# Patient Record
Sex: FEMALE | Race: WHITE | NOT HISPANIC OR LATINO | Employment: STUDENT | URBAN - METROPOLITAN AREA
[De-identification: names, ages, dates, MRNs, and addresses within clinical notes are randomized per-mention and may not be internally consistent; named-entity substitution may affect disease eponyms.]

---

## 2017-04-05 ENCOUNTER — OFFICE VISIT (OUTPATIENT)
Dept: FAMILY MEDICINE | Facility: CLINIC | Age: 32
End: 2017-04-05
Payer: COMMERCIAL

## 2017-04-05 VITALS
WEIGHT: 136 LBS | DIASTOLIC BLOOD PRESSURE: 56 MMHG | HEART RATE: 86 BPM | BODY MASS INDEX: 23.22 KG/M2 | HEIGHT: 64 IN | SYSTOLIC BLOOD PRESSURE: 116 MMHG | OXYGEN SATURATION: 99 % | TEMPERATURE: 98.2 F

## 2017-04-05 DIAGNOSIS — J45.20 INTERMITTENT ASTHMA, UNCOMPLICATED: ICD-10-CM

## 2017-04-05 DIAGNOSIS — T78.40XA ALLERGIC STATE, INITIAL ENCOUNTER: Primary | ICD-10-CM

## 2017-04-05 DIAGNOSIS — J20.9 BRONCHITIS, ACUTE, WITH BRONCHOSPASM: ICD-10-CM

## 2017-04-05 PROCEDURE — 99213 OFFICE O/P EST LOW 20 MIN: CPT | Performed by: FAMILY MEDICINE

## 2017-04-05 RX ORDER — PREDNISONE 20 MG/1
40 TABLET ORAL DAILY
Qty: 10 TABLET | Refills: 1 | Status: SHIPPED | OUTPATIENT
Start: 2017-04-05 | End: 2017-12-05

## 2017-04-05 NOTE — PROGRESS NOTES
"SUBJECTIVE:  Katerin Jasso is a 31 year old female who presents with the following concerns;              Symptoms: cc Present Absent Comment   Fever/Chills   x    Fatigue  x     Headache   x    Muscle or Body  Aches   x    Eye Irritation   x    Sneezing   x    Nasal Kenan/Drg x      Sinus Pressure/Pain  x  Getting better   Dental pain   x    Sore Throat  x     Swollen Glands   x    Ear Pain/Fullness   x    Cough x      Wheeze  x     Chest Discomfort x      Shortness of breath  x     Abdominal pain   x    Emesis    x    Diarrhea   x    Other   x      Symptom duration:  2 week   Symptom severity:     Treatments tried:  mucinex last week   Contacts:  son   She has a history of prolonged cough with viral bronchitis . She usually responds to a course of steroids this often happens in the spring and fall.   She has been taking allergy medicine   She also has noted recently a strange feelin gin her throat when she has eaten nuts recently she is worried about having a reaction and would like to see the allergist. With her history of  Asthma this would be recommended   [facility]  Patient Active Problem List   Diagnosis     Allergic rhinitis     Post viral asthma     GERD (gastroesophageal reflux disease)     CARDIOVASCULAR SCREENING; LDL GOAL LESS THAN 160     ROS: Constitutional, HEENT, cardiovascular, respiratory, GI, , and skin are otherwise negative except as noted above.    PHYSICAL EXAM:    /56 (BP Location: Right arm, Cuff Size: Adult Regular)  Pulse 86  Temp 98.2  F (36.8  C) (Tympanic)  Ht 5' 4\" (1.626 m)  Wt 136 lb (61.7 kg)  SpO2 99%  BMI 23.34 kg/m2  GENERAL: Active, alert and no distress.  EYES: PERRL/EOMI.  Bilateral sclera/conjunctiva clear.  HEENT: Audible congestion with post nasal discharge.  TMs gray and translucent.  Oral mucosa moist and pink.  Posterior pharynx with increased erythema. Uvula midline.  NECK: Supple with full range of motion.  Bilateral shotty anterior cervical nodes.  CV: " Regular rate and rhythm without murmur.  LUNGS: Clear to auscultation.  ABD: Soft, nontender, nondistended. No HSM or masses palpated.  SKIN:  No rash. Warm, pink. Capillary refill less than 2 seconds.  1. Intermittent asthma, uncomplicated    - predniSONE (DELTASONE) 20 MG tablet; Take 2 tablets (40 mg) by mouth daily  Dispense: 10 tablet; Refill: 1    2. Bronchitis, acute, with bronchospasm    - predniSONE (DELTASONE) 20 MG tablet; Take 2 tablets (40 mg) by mouth daily  Dispense: 10 tablet; Refill: 1    3. Allergic state, initial encounter    - ALLERGY/ASTHMA ADULT REFERRAL    Holly Miller M.D.  [facility]

## 2017-04-05 NOTE — NURSING NOTE
"Chief Complaint   Patient presents with     Cold Symptoms       Initial /56 (BP Location: Right arm, Cuff Size: Adult Regular)  Pulse 86  Temp 98.2  F (36.8  C) (Tympanic)  Ht 5' 4\" (1.626 m)  Wt 136 lb (61.7 kg)  SpO2 99%  BMI 23.34 kg/m2 Estimated body mass index is 23.34 kg/(m^2) as calculated from the following:    Height as of this encounter: 5' 4\" (1.626 m).    Weight as of this encounter: 136 lb (61.7 kg).  Medication Reconciliation: complete   Adelaide Wade CMA    "

## 2017-04-05 NOTE — MR AVS SNAPSHOT
After Visit Summary   4/5/2017    Katerin Jasso    MRN: [id number]           Patient Information     Date Of Birth          1985        Visit Information        Provider Department      4/5/2017 8:30 AM Holly Miller MD [facility]        Today's Diagnoses     Allergic state, initial encounter    -  1    Intermittent asthma, uncomplicated        Bronchitis, acute, with bronchospasm           Follow-ups after your visit        Additional Services     ALLERGY/ASTHMA ADULT REFERRAL       Your provider has referred you to: [facility]:  [facility] [phone] [email or web address]/    Please be aware that coverage of these services is subject to the terms and limitations of your health insurance plan.  Call member services at your health plan with any benefit or coverage questions.      Please bring the following with you to your appointment:    (1) Any X-Rays, CTs or MRIs which have been performed.  Contact the facility where they were done to arrange for  prior to your scheduled appointment.    (2) List of current medications  (3) This referral request   (4) Any documents/labs given to you for this referral                  Follow-up notes from your care team     Return if symptoms worsen or fail to improve.      Who to contact     Normal or non-critical lab and imaging results will be communicated to you by MyChart, letter or phone within 4 business days after the clinic has received the results. If you do not hear from us within 7 days, please contact the clinic through MyChart or phone. If you have a critical or abnormal lab result, we will notify you by phone as soon as possible.  Submit refill requests through [employer]Gig or call your pharmacy and they will forward the refill request to us. Please allow 3 business days for your refill to be completed.          If you need to speak with a  for additional information ,  "please call: [phone]             Additional Information About Your Visit        Pied Piperhart Information     Tilera gives you secure access to your electronic health record. If you see a primary care provider, you can also send messages to your care team and make appointments. If you have questions, please call your primary care clinic.  If you do not have a primary care provider, please call [phone] and they will assist you.        Care EveryWhere ID     This is your Care EveryWhere ID. This could be used by other organizations to access your [facility] medical records  [id number]        Your Vitals Were     Pulse Temperature Height Pulse Oximetry BMI (Body Mass Index)       86 98.2  F (36.8  C) (Tympanic) 5' 4\" (1.626 m) 99% 23.34 kg/m2        Blood Pressure from Last 3 Encounters:   04/05/17 116/56   07/28/16 111/75   01/21/16 133/82    Weight from Last 3 Encounters:   04/05/17 136 lb (61.7 kg)   07/28/16 136 lb (61.7 kg)   01/21/16 131 lb 12.8 oz (59.8 kg)              We Performed the Following     ALLERGY/ASTHMA ADULT REFERRAL          Today's Medication Changes          These changes are accurate as of: 4/5/17  9:52 AM.  If you have any questions, ask your nurse or doctor.               Start taking these medicines.        Dose/Directions    predniSONE 20 MG tablet   Commonly known as:  DELTASONE   Used for:  Intermittent asthma, uncomplicated, Bronchitis, acute, with bronchospasm        Dose:  40 mg   Take 2 tablets (40 mg) by mouth daily   Quantity:  10 tablet   Refills:  1            Where to get your medicines      These medications were sent to [facility]  [place], [place] - [address]  [address] [place] [address]     Phone:  [phone]     predniSONE 20 MG tablet                Primary Care Provider Office Phone # Fax #    Norma Sharma [phone]       [facility] [address] [facility] [address]        Thank you!     Thank " you for choosing [facility]  for your care. Our goal is always to provide you with excellent care. Hearing back from our patients is one way we can continue to improve our services. Please take a few minutes to complete the written survey that you may receive in the mail after your visit with us. Thank you!             Your Updated Medication List - Protect others around you: Learn how to safely use, store and throw away your medicines at www.disposemymeds.org.          This list is accurate as of: 4/5/17  9:52 AM.  Always use your most recent med list.                   Brand Name Dispense Instructions for use    * albuterol (2.5 MG/3ML) 0.083% neb solution     60 vial    Take 1 vial (2.5 mg) by nebulization every 6 hours as needed for shortness of breath / dyspnea or wheezing       * albuterol 108 (90 BASE) MCG/ACT Inhaler    albuterol    1 Inhaler    Inhale 2 puffs into the lungs every 4 hours as needed for shortness of breath / dyspnea EVERY 4 TO 6 HOURS AS NEEDED       ibuprofen 400 MG tablet    ADVIL/MOTRIN    120 tablet    Take 1-2 tablets (400-800 mg) by mouth every 6 hours as needed for other (cramping)       predniSONE 20 MG tablet    DELTASONE    10 tablet    Take 2 tablets (40 mg) by mouth daily       * Notice:  This list has 2 medication(s) that are the same as other medications prescribed for you. Read the directions carefully, and ask your doctor or other care provider to review them with you.

## 2017-07-15 ENCOUNTER — HEALTH MAINTENANCE LETTER (OUTPATIENT)
Age: 32
End: 2017-07-15

## 2017-09-13 ENCOUNTER — TELEPHONE (OUTPATIENT)
Dept: FAMILY MEDICINE | Facility: CLINIC | Age: 32
End: 2017-09-13

## 2017-09-13 NOTE — TELEPHONE ENCOUNTER
Panel Management Review      Patient has the following on her problem list:     Asthma review     ACT Total Scores 7/28/2016   ACT TOTAL SCORE -   ASTHMA ER VISITS -   ASTHMA HOSPITALIZATIONS -   ACT TOTAL SCORE (Goal Greater than or Equal to 20) 22   In the past 12 months, how many times did you visit the emergency room for your asthma without being admitted to the hospital? 0   In the past 12 months, how many times were you hospitalized overnight because of your asthma? 0      1. Is Asthma diagnosis on the Problem List? Yes    2. Is Asthma listed on Health Maintenance? Yes    3. Patient is due for:  ACT and AAP        Composite cancer screening  Chart review shows that this patient is due/due soon for the following Pap Smear  Summary:    Patient is due/failing the following:   ACT and PAP    Action needed:   Patient needs office visit for PEPAP. and Patient needs to do ACT.    Type of outreach:    Sent [email or web address] message.    Questions for provider review:    None                                                                                                                                  Adelaide Wade CMA

## 2017-10-16 DIAGNOSIS — J45.20 MILD INTERMITTENT ASTHMA WITHOUT COMPLICATION: ICD-10-CM

## 2017-10-16 RX ORDER — ALBUTEROL SULFATE 90 UG/1
AEROSOL, METERED RESPIRATORY (INHALATION)
Qty: 1 INHALER | Refills: 5 | Status: SHIPPED | OUTPATIENT
Start: 2017-10-16 | End: 2017-12-13

## 2017-10-16 NOTE — TELEPHONE ENCOUNTER
Prescription approved per [facility] Refill Protocol.  Omar King RN

## 2017-12-05 ENCOUNTER — E-VISIT (OUTPATIENT)
Dept: FAMILY MEDICINE | Facility: CLINIC | Age: 32
End: 2017-12-05
Payer: COMMERCIAL

## 2017-12-05 DIAGNOSIS — J45.990 EXERCISE-INDUCED ASTHMA: Primary | ICD-10-CM

## 2017-12-13 ENCOUNTER — OFFICE VISIT (OUTPATIENT)
Dept: FAMILY MEDICINE | Facility: CLINIC | Age: 32
End: 2017-12-13
Payer: COMMERCIAL

## 2017-12-13 VITALS
HEIGHT: 64 IN | DIASTOLIC BLOOD PRESSURE: 81 MMHG | TEMPERATURE: 98 F | HEART RATE: 98 BPM | SYSTOLIC BLOOD PRESSURE: 124 MMHG | BODY MASS INDEX: 24.45 KG/M2 | WEIGHT: 143.2 LBS

## 2017-12-13 DIAGNOSIS — J01.90 ACUTE SINUSITIS WITH SYMPTOMS > 10 DAYS: ICD-10-CM

## 2017-12-13 DIAGNOSIS — T78.40XA ALLERGIC SYMPTOMS, INITIAL ENCOUNTER: ICD-10-CM

## 2017-12-13 DIAGNOSIS — J20.9 BRONCHITIS, ACUTE, WITH BRONCHOSPASM: ICD-10-CM

## 2017-12-13 DIAGNOSIS — J45.20 MILD INTERMITTENT ASTHMA WITHOUT COMPLICATION: Primary | ICD-10-CM

## 2017-12-13 PROCEDURE — 99214 OFFICE O/P EST MOD 30 MIN: CPT | Performed by: FAMILY MEDICINE

## 2017-12-13 RX ORDER — ALBUTEROL SULFATE 90 UG/1
AEROSOL, METERED RESPIRATORY (INHALATION)
Qty: 1 INHALER | Refills: 5 | Status: SHIPPED | OUTPATIENT
Start: 2017-12-13 | End: 2018-07-11

## 2017-12-13 RX ORDER — ALBUTEROL SULFATE 0.83 MG/ML
1 SOLUTION RESPIRATORY (INHALATION) EVERY 6 HOURS PRN
Qty: 60 VIAL | Refills: 1 | Status: SHIPPED | OUTPATIENT
Start: 2017-12-13

## 2017-12-13 NOTE — NURSING NOTE
"Chief Complaint   Patient presents with     Asthma     Cold Symptoms       Initial /81 (BP Location: Right arm, Patient Position: Chair, Cuff Size: Adult Regular)  Pulse 98  Temp 98  F (36.7  C) (Tympanic)  Ht 5' 4\" (1.626 m)  Wt 143 lb 3.2 oz (65 kg)  BMI 24.58 kg/m2 Estimated body mass index is 24.58 kg/(m^2) as calculated from the following:    Height as of this encounter: 5' 4\" (1.626 m).    Weight as of this encounter: 143 lb 3.2 oz (65 kg).  Medication Reconciliation: complete   Adelaide Wade CMA    "

## 2017-12-13 NOTE — PATIENT INSTRUCTIONS
Sinus pressure is primarily a problem of drainage.  You can best help your body clear the sinus secretions and pressure by opening up the natural passageways which are often blocked by viral colds and allergies.      Short courses of a nasal decongestant spray (Afrin or Neosinephrine) are one of the most effective tools in opening sinus drainage passageways.  Their use should be restricted to 3 days though due to the high risk of nasal addiction.  Pseudoephedrine or phenylephrine (Sudafed) is often helpful but it can cause elevations in blood pressure and insomnia.     Sometimes a nasal saline spray will help rinse out the nasal passages and feel good.     Guaifenesin (Robitussin or Mucinex) helps loosen secretions and often help make the mucous more liquid and easier to clear.    For pain and fevers, acetaminophen (Tylenol) is most appropriate.  Ibuprofen (Advil) or naproxen (Aleve) are useful too and last longer but they can cause elevation of blood pressure or stomach problems.    Antihistamines (Benadryl, Dimetapp, etc.) cause sedation, confusion, bowel and urinary abnormalities and are of little use for infectious causes of cough and nasal congestion.  Their use should be reserved for allergic symptoms.    The body needs to be treated well in order to help heal itself.  Rest as needed.  It is ok to reduce food intake if appetite is poor but it is quite important to maintain/increase fluid intake.  .

## 2017-12-13 NOTE — MR AVS SNAPSHOT
After Visit Summary   12/13/2017    Katerin Jasso    MRN: [id number]           Patient Information     Date Of Birth          1985        Visit Information        Provider Department      12/13/2017 8:00 AM Holly Miller MD [facility]        Today's Diagnoses     Mild intermittent asthma without complication    -  1    Bronchitis, acute, with bronchospasm        Acute sinusitis with symptoms > 10 days        Allergic symptoms, initial encounter          Care Instructions    Sinus pressure is primarily a problem of drainage.  You can best help your body clear the sinus secretions and pressure by opening up the natural passageways which are often blocked by viral colds and allergies.      Short courses of a nasal decongestant spray (Afrin or Neosinephrine) are one of the most effective tools in opening sinus drainage passageways.  Their use should be restricted to 3 days though due to the high risk of nasal addiction.  Pseudoephedrine or phenylephrine (Sudafed) is often helpful but it can cause elevations in blood pressure and insomnia.     Sometimes a nasal saline spray will help rinse out the nasal passages and feel good.     Guaifenesin (Robitussin or Mucinex) helps loosen secretions and often help make the mucous more liquid and easier to clear.    For pain and fevers, acetaminophen (Tylenol) is most appropriate.  Ibuprofen (Advil) or naproxen (Aleve) are useful too and last longer but they can cause elevation of blood pressure or stomach problems.    Antihistamines (Benadryl, Dimetapp, etc.) cause sedation, confusion, bowel and urinary abnormalities and are of little use for infectious causes of cough and nasal congestion.  Their use should be reserved for allergic symptoms.    The body needs to be treated well in order to help heal itself.  Rest as needed.  It is ok to reduce food intake if appetite is poor but it is quite important to maintain/increase fluid intake.   .            Follow-ups after your visit        Additional Services     ALLERGY/ASTHMA ADULT REFERRAL       Your provider has referred you to: [facility]:  [facility] [phone] [email or web address]/  [facility]. [facility] [place] [phone]   [email or web address]  [facility] [place] [phone]   [email or web address]    Please be aware that coverage of these services is subject to the terms and limitations of your health insurance plan.  Call member services at your health plan with any benefit or coverage questions.      Please bring the following with you to your appointment:    (1) Any X-Rays, CTs or MRIs which have been performed.  Contact the facility where they were done to arrange for  prior to your scheduled appointment.    (2) List of current medications  (3) This referral request   (4) Any documents/labs given to you for this referral                  Who to contact     Normal or non-critical lab and imaging results will be communicated to you by Repplerhart, letter or phone within 4 business days after the clinic has received the results. If you do not hear from us within 7 days, please contact the clinic through Repplerhart or phone. If you have a critical or abnormal lab result, we will notify you by phone as soon as possible.  Submit refill requests through Sahale [employer]acks or call your pharmacy and they will forward the refill request to us. Please allow 3 business days for your refill to be completed.          If you need to speak with a  for additional information , please call: [phone]             Additional Information About Your Visit        RepplerharPlix Information     [employer] gives you secure access to your electronic health record. If you see a primary care provider, you can also send messages to your care team and make appointments. If you have questions, please call your primary care  "clinic.  If you do not have a primary care provider, please call [phone] and they will assist you.        Care EveryWhere ID     This is your Care EveryWhere ID. This could be used by other organizations to access your [facility] medical records  [id number]        Your Vitals Were     Pulse Temperature Height BMI (Body Mass Index)          98 98  F (36.7  C) (Tympanic) 5' 4\" (1.626 m) 24.58 kg/m2         Blood Pressure from Last 3 Encounters:   12/13/17 124/81   04/05/17 116/56   07/28/16 111/75    Weight from Last 3 Encounters:   12/13/17 143 lb 3.2 oz (65 kg)   04/05/17 136 lb (61.7 kg)   07/28/16 136 lb (61.7 kg)              We Performed the Following     ALLERGY/ASTHMA ADULT REFERRAL          Today's Medication Changes          These changes are accurate as of: 12/13/17  8:37 AM.  If you have any questions, ask your nurse or doctor.               Start taking these medicines.        Dose/Directions    amoxicillin-clavulanate 875-125 MG per tablet   Commonly known as:  AUGMENTIN   Used for:  Acute sinusitis with symptoms > 10 days   Started by:  Holly Miller MD        Dose:  1 tablet   Take 1 tablet by mouth 2 times daily   Quantity:  20 tablet   Refills:  0       fluticasone 100 MCG/BLIST Aepb   Commonly known as:  FLOVENT DISKUS   Used for:  Mild intermittent asthma without complication   Started by:  Holly Miller MD        Dose:  1 puff   Inhale 1 puff into the lungs 2 times daily   Quantity:  3 Inhaler   Refills:  3         These medicines have changed or have updated prescriptions.        Dose/Directions    * albuterol 108 (90 BASE) MCG/ACT Inhaler   Commonly known as:  VENTOLIN HFA   This may have changed:  See the new instructions.   Used for:  Mild intermittent asthma without complication   Changed by:  Holly Miller MD        INHALE 2 PUFFS EVERY 4-6 HOURS AS NEEDED FOR SHORTNESS OF BREATH   Quantity:  1 Inhaler   Refills:  5       * albuterol (2.5 MG/3ML) 0.083% neb solution   This " may have changed:  Another medication with the same name was changed. Make sure you understand how and when to take each.   Used for:  Bronchitis, acute, with bronchospasm   Changed by:  Holly Miller MD        Dose:  1 vial   Take 1 vial (2.5 mg) by nebulization every 6 hours as needed for shortness of breath / dyspnea or wheezing   Quantity:  60 vial   Refills:  1       * Notice:  This list has 2 medication(s) that are the same as other medications prescribed for you. Read the directions carefully, and ask your doctor or other care provider to review them with you.         Where to get your medicines      These medications were sent to [place] [id number] [place] [facility] [address] [facility]  [address] [facility] [address]     Phone:  [phone]     albuterol (2.5 MG/3ML) 0.083% neb solution    albuterol 108 (90 BASE) MCG/ACT Inhaler    amoxicillin-clavulanate 875-125 MG per tablet    fluticasone 100 MCG/BLIST Aepb                Primary Care Provider Office Phone # Fax #    Norma Sharma [phone] [address] [facility] [address]        Equal Access to Services     GERMÁN GILLIAM AH: Hadii adriana navao Somisty, waaxda luqadaha, qaybta kaalmada adeegyada, cheryl mary. So [facility] [phone].    ATENCIÓN: Si habla español, tiene a kee disposición servicios gratuitos de asistencia lingüística. [facility] [phone].    We comply with applicable federal civil rights laws and [place] laws. We do not discriminate on the basis of race, color, national origin, age, disability, sex, sexual orientation, or gender identity.            Thank you!     Thank you for choosing [facility]  for your care. Our goal is always to provide you with excellent care. Hearing back from our patients is one way we can continue to improve our services. Please take a few minutes to complete the written survey that you may receive in the mail after your visit  with us. Thank you!             Your Updated Medication List - Protect others around you: Learn how to safely use, store and throw away your medicines at www.disposemymeds.org.          This list is accurate as of: 17  8:37 AM.  Always use your most recent med list.                   Brand Name Dispense Instructions for use Diagnosis    * albuterol 108 (90 BASE) MCG/ACT Inhaler    VENTOLIN HFA    1 Inhaler    INHALE 2 PUFFS EVERY 4-6 HOURS AS NEEDED FOR SHORTNESS OF BREATH    Mild intermittent asthma without complication       * albuterol (2.5 MG/3ML) 0.083% neb solution     60 vial    Take 1 vial (2.5 mg) by nebulization every 6 hours as needed for shortness of breath / dyspnea or wheezing    Bronchitis, acute, with bronchospasm       amoxicillin-clavulanate 875-125 MG per tablet    AUGMENTIN    20 tablet    Take 1 tablet by mouth 2 times daily    Acute sinusitis with symptoms > 10 days       fluticasone 100 MCG/BLIST Aepb    FLOVENT DISKUS    3 Inhaler    Inhale 1 puff into the lungs 2 times daily    Mild intermittent asthma without complication       ibuprofen 400 MG tablet    ADVIL/MOTRIN    120 tablet    Take 1-2 tablets (400-800 mg) by mouth every 6 hours as needed for other (cramping)    S/P  section       * Notice:  This list has 2 medication(s) that are the same as other medications prescribed for you. Read the directions carefully, and ask your doctor or other care provider to review them with you.

## 2017-12-13 NOTE — PROGRESS NOTES
SUBJECTIVE:                                                    Katerin Jasso is a 32 year old female who presents to clinic today for the following health issues:    Check on cold/asthma symptoms,  Completed prednisone given 12/5/17. Still having nasal drainage, coughing, fatigue, SOB, mild wheezing     Asthma Follow-Up    Was ACT completed today?    Yes    ACT Total Scores 12/13/2017   ACT TOTAL SCORE -   ASTHMA ER VISITS -   ASTHMA HOSPITALIZATIONS -   ACT TOTAL SCORE (Goal Greater than or Equal to 20) 15   In the past 12 months, how many times did you visit the emergency room for your asthma without being admitted to the hospital? 0   In the past 12 months, how many times were you hospitalized overnight because of your asthma? 0       Recent asthma triggers that patient is dealing with: upper respiratory infections          Problem list and histories reviewed & adjusted, as indicated.  Additional history:constant postnasal drainage   Started out as a cold   Nowit has just settled into the back of the throat she has had some nasty drainage that she gets out each morning in the first part of the day.  She uses her Ventolin inhaler several times per day she does this especially during change of season and during upper respiratory infections she is done this for the last several years she goes through about an inhaler a month she does have a diagnosis of asthma but this has not been addressed recently she also has noted that she has been having more difficulty breathing and feels like her chest is tight most days for the last 3 weeks she has had an upper respiratory infection that has made her feel like she cannot get a deep breath most days she has never been on an inhaled steroid in the past but she is interested in controlling her asthma we did discuss asthma as an inflammatory disease and daily treatment  She also feels like she might be allergic to peanuts she would like a referral to an allergist to further  "pursue this she might have had some reaction in the past I think she might benefit from seeing an allergist also in relation to her asthma in the morning has nasty stuff       Patient Active Problem List   Diagnosis     Allergic rhinitis     Post viral asthma     GERD (gastroesophageal reflux disease)     CARDIOVASCULAR SCREENING; LDL GOAL LESS THAN 160     Past Surgical History:   Procedure Laterality Date      SECTION N/A 12/10/2015    Procedure:  SECTION;  Surgeon: Norma Sharma MD;  Location: [facility] OR     NO HISTORY OF SURGERY  as of 2015       Social History   Substance Use Topics     Smoking status: Former Smoker     Packs/day: 0.50     Types: Cigarettes     Quit date: 2015     Smokeless tobacco: Never Used      Comment: smoking 10cig/day- down to 1-2 cig/day with pregnancy     Alcohol use Yes      Comment: rare- quit with pregnancy     Family History   Problem Relation Age of Onset     Allergies Mother      food allergies as well as seasonal and bees     Asthma Mother      Hypertension Father      C.A.D. Father      GASTROINTESTINAL DISEASE Father      IBS- ulcer     Hypertension Paternal Grandmother      Arthritis Paternal Grandmother      CEREBROVASCULAR DISEASE Paternal Grandmother      CANCER Paternal Grandfather      bladder cancer     Alzheimer Disease Paternal Grandfather      DIABETES No family hx of      Breast Cancer No family hx of      Cancer - colorectal No family hx of              ROS:  Constitutional, HEENT, cardiovascular, pulmonary, GI, , musculoskeletal, neuro, skin, endocrine and psych systems are negative, except as otherwise noted.      OBJECTIVE:                                                    /81 (BP Location: Right arm, Patient Position: Chair, Cuff Size: Adult Regular)  Pulse 98  Temp 98  F (36.7  C) (Tympanic)  Ht 5' 4\" (1.626 m)  Wt 143 lb 3.2 oz (65 kg)  BMI 24.58 kg/m2 Body mass index is 24.58 kg/(m^2).   GENERAL: healthy, alert, well " nourished, well hydrated, no distress  HENT: ear canals- normal; TMs- normal; Nose- normal; Mouth- no ulcers, no lesions  NECK: no tenderness, no adenopathy, no asymmetry, no masses, no stiffness; thyroid- normal to palpation  RESP: lungs clear to auscultation - no rales, no rhonchi, no wheezes  CV: regular rates and rhythm, normal S1 S2, no S3 or S4 and no murmur, no click or rub -  ABDOMEN: soft, no tenderness, no  hepatosplenomegaly, no masses, normal bowel sounds  SKIN: no suspicious lesions, no rashes       ASSESSMENT/PLAN:                                                      1. Mild intermittent asthma without complication  This may now be more persistent and have her try the Flovent daily and see how she feels for the next 3-4 weeks I suspect that she is going to feel better  - albuterol (VENTOLIN HFA) 108 (90 BASE) MCG/ACT Inhaler; INHALE 2 PUFFS EVERY 4-6 HOURS AS NEEDED FOR SHORTNESS OF BREATH  Dispense: 1 Inhaler; Refill: 5  - fluticasone (FLOVENT DISKUS) 100 MCG/BLIST AEPB; Inhale 1 puff into the lungs 2 times daily  Dispense: 3 Inhaler; Refill: 3    2. Bronchitis, acute, with bronchospasm    - albuterol (2.5 MG/3ML) 0.083% neb solution; Take 1 vial (2.5 mg) by nebulization every 6 hours as needed for shortness of breath / dyspnea or wheezing  Dispense: 60 vial; Refill: 1    3. Acute sinusitis with symptoms > 10 days  See patient information  - amoxicillin-clavulanate (AUGMENTIN) 875-125 MG per tablet; Take 1 tablet by mouth 2 times daily  Dispense: 20 tablet; Refill: 0    4. Allergic symptoms, initial encounter    - ALLERGY/ASTHMA ADULT REFERRAL     reports that she quit smoking about 2 years ago. Her smoking use included Cigarettes. She smoked 0.50 packs per day. She has never used smokeless tobacco.          Holly Miller M.D.  [facility]

## 2017-12-14 ASSESSMENT — ASTHMA QUESTIONNAIRES: ACT_TOTALSCORE: 15

## 2018-01-15 ENCOUNTER — MYC MEDICAL ADVICE (OUTPATIENT)
Dept: OBGYN | Facility: CLINIC | Age: 33
End: 2018-01-15

## 2018-01-15 NOTE — TELEPHONE ENCOUNTER
"S-(situation): Having menstrual like cramping for 1 and half wks.  No abdominal pain.  LMP 11-12-17.  Stomach gurgling a lot and having diarrhea.    B-(background):  estimate day of delivery 18.    A-(assessment): No vaginal bleeding, feels real bloated after eating and \"I cannot eat a ton\".  She denies any UTI symptoms and since stomach has been gurgling she has had diarrhea.  She denies having the stomach bug.    R-(recommendations): Advised pt that she should schedule her first ob with any of the providers if she wants to be seen earlier than end of .  She will need to call clinic tomorrow to schedule this since staff have left for the day.  Pt agrees with this plan.    Leonie Watson  [facility] RN    "

## 2018-01-17 ENCOUNTER — TELEPHONE (OUTPATIENT)
Dept: FAMILY MEDICINE | Facility: CLINIC | Age: 33
End: 2018-01-17

## 2018-01-17 PROBLEM — J45.20 MILD INTERMITTENT ASTHMA WITHOUT COMPLICATION: Status: ACTIVE | Noted: 2018-01-17

## 2018-01-17 PROBLEM — J45.20 MILD INTERMITTENT ASTHMA WITHOUT COMPLICATION: Status: ACTIVE | Noted: 2017-12-13

## 2018-01-17 NOTE — TELEPHONE ENCOUNTER
Panel Management Review      Patient has the following on her problem list:     Asthma review     ACT Total Scores 12/13/2017   ACT TOTAL SCORE -   ASTHMA ER VISITS -   ASTHMA HOSPITALIZATIONS -   ACT TOTAL SCORE (Goal Greater than or Equal to 20) 15   In the past 12 months, how many times did you visit the emergency room for your asthma without being admitted to the hospital? 0   In the past 12 months, how many times were you hospitalized overnight because of your asthma? 0      1. Is Asthma diagnosis on the Problem List? Yes    2. Is Asthma listed on Health Maintenance? Yes    3. Patient is due for:  ACT due to low score and cold at last visit        Composite cancer screening  Chart review shows that this patient is due/due soon for the following Pap Smear  Summary:    Patient is due/failing the following:   ACT    Action needed:   Patient needs to do ACT.    Type of outreach:    Sent [email or web address] message.    Questions for provider review:    None                                                                                                                                  Adelaide Wade CMA

## 2018-01-18 ENCOUNTER — PRENATAL OFFICE VISIT (OUTPATIENT)
Dept: OBGYN | Facility: CLINIC | Age: 33
End: 2018-01-18

## 2018-01-18 ENCOUNTER — APPOINTMENT (OUTPATIENT)
Dept: OBGYN | Facility: CLINIC | Age: 33
End: 2018-01-18
Payer: COMMERCIAL

## 2018-01-18 DIAGNOSIS — Z34.80 PRENATAL CARE, SUBSEQUENT PREGNANCY: Primary | ICD-10-CM

## 2018-01-18 PROCEDURE — 99207 ZZC NO CHARGE NURSE ONLY: CPT | Performed by: OBSTETRICS & GYNECOLOGY

## 2018-01-26 ENCOUNTER — PRENATAL OFFICE VISIT (OUTPATIENT)
Dept: OBGYN | Facility: CLINIC | Age: 33
End: 2018-01-26
Payer: COMMERCIAL

## 2018-01-26 VITALS
BODY MASS INDEX: 23.39 KG/M2 | SYSTOLIC BLOOD PRESSURE: 128 MMHG | HEART RATE: 113 BPM | DIASTOLIC BLOOD PRESSURE: 76 MMHG | HEIGHT: 64 IN | RESPIRATION RATE: 16 BRPM | TEMPERATURE: 97.1 F | WEIGHT: 137 LBS

## 2018-01-26 DIAGNOSIS — Z34.81 PRENATAL CARE, SUBSEQUENT PREGNANCY IN FIRST TRIMESTER: Primary | ICD-10-CM

## 2018-01-26 DIAGNOSIS — Z98.891 H/O: C-SECTION: ICD-10-CM

## 2018-01-26 LAB
ABO + RH BLD: NORMAL
ABO + RH BLD: NORMAL
BLD GP AB SCN SERPL QL: NORMAL
BLOOD BANK CMNT PATIENT-IMP: NORMAL
ERYTHROCYTE [DISTWIDTH] IN BLOOD BY AUTOMATED COUNT: 11.7 % (ref 10–15)
HBV SURFACE AG SERPL QL IA: NONREACTIVE
HCT VFR BLD AUTO: 37 % (ref 35–47)
HGB BLD-MCNC: 12.8 G/DL (ref 11.7–15.7)
HIV 1+2 AB+HIV1 P24 AG SERPL QL IA: NONREACTIVE
MCH RBC QN AUTO: 33.6 PG (ref 26.5–33)
MCHC RBC AUTO-ENTMCNC: 34.6 G/DL (ref 31.5–36.5)
MCV RBC AUTO: 97 FL (ref 78–100)
PLATELET # BLD AUTO: 282 10E9/L (ref 150–450)
RBC # BLD AUTO: 3.81 10E12/L (ref 3.8–5.2)
SPECIMEN EXP DATE BLD: NORMAL
WBC # BLD AUTO: 13.7 10E9/L (ref 4–11)

## 2018-01-26 PROCEDURE — 87624 HPV HI-RISK TYP POOLED RSLT: CPT | Performed by: OBSTETRICS & GYNECOLOGY

## 2018-01-26 PROCEDURE — 87340 HEPATITIS B SURFACE AG IA: CPT | Performed by: OBSTETRICS & GYNECOLOGY

## 2018-01-26 PROCEDURE — 86762 RUBELLA ANTIBODY: CPT | Performed by: OBSTETRICS & GYNECOLOGY

## 2018-01-26 PROCEDURE — 86780 TREPONEMA PALLIDUM: CPT | Performed by: OBSTETRICS & GYNECOLOGY

## 2018-01-26 PROCEDURE — 76817 TRANSVAGINAL US OBSTETRIC: CPT | Performed by: OBSTETRICS & GYNECOLOGY

## 2018-01-26 PROCEDURE — 86901 BLOOD TYPING SEROLOGIC RH(D): CPT | Performed by: OBSTETRICS & GYNECOLOGY

## 2018-01-26 PROCEDURE — 99207 ZZC FIRST OB VISIT: CPT | Performed by: OBSTETRICS & GYNECOLOGY

## 2018-01-26 PROCEDURE — 87389 HIV-1 AG W/HIV-1&-2 AB AG IA: CPT | Performed by: OBSTETRICS & GYNECOLOGY

## 2018-01-26 PROCEDURE — 86850 RBC ANTIBODY SCREEN: CPT | Performed by: OBSTETRICS & GYNECOLOGY

## 2018-01-26 PROCEDURE — 85027 COMPLETE CBC AUTOMATED: CPT | Performed by: OBSTETRICS & GYNECOLOGY

## 2018-01-26 PROCEDURE — G0145 SCR C/V CYTO,THINLAYER,RESCR: HCPCS | Performed by: OBSTETRICS & GYNECOLOGY

## 2018-01-26 PROCEDURE — 36415 COLL VENOUS BLD VENIPUNCTURE: CPT | Performed by: OBSTETRICS & GYNECOLOGY

## 2018-01-26 PROCEDURE — 86900 BLOOD TYPING SEROLOGIC ABO: CPT | Performed by: OBSTETRICS & GYNECOLOGY

## 2018-01-26 RX ORDER — PRENATAL VIT/IRON FUM/FOLIC AC 27MG-0.8MG
1 TABLET ORAL DAILY
COMMUNITY
End: 2018-09-25

## 2018-01-26 NOTE — MR AVS SNAPSHOT
"              After Visit Summary   2018    Katerin Jasso    MRN: [id number]           Patient Information     Date Of Birth          1985        Visit Information        Provider Department      2018 10:30 AM Nelda Sanders MD; [facility]        Today's Diagnoses     Prenatal care, subsequent pregnancy in first trimester    -  1    H/O:            Follow-ups after your visit        Who to contact     If you have questions or need follow up information about today's clinic visit or your schedule please contact [facility] directly at [phone].  Normal or non-critical lab and imaging results will be communicated to you by OhLifehart, letter or phone within 4 business days after the clinic has received the results. If you do not hear from us within 7 days, please contact the clinic through OhLifehart or phone. If you have a critical or abnormal lab result, we will notify you by phone as soon as possible.  Submit refill requests through [employer] or call your pharmacy and they will forward the refill request to us. Please allow 3 business days for your refill to be completed.          Additional Information About Your Visit        MyChart Information     [employer] gives you secure access to your electronic health record. If you see a primary care provider, you can also send messages to your care team and make appointments. If you have questions, please call your primary care clinic.  If you do not have a primary care provider, please call [phone] and they will assist you.        Care EveryWhere ID     This is your Care EveryWhere ID. This could be used by other organizations to access your [place] medical records  [id number]        Your Vitals Were     Pulse Temperature Respirations Height Last Period BMI (Body Mass Index)    113 97.1  F (36.2  C) (Tympanic) 16 5' 4\" (1.626 m) 2017 23.52 kg/m2       Blood Pressure from Last 3 " Encounters:   01/26/18 128/76   12/13/17 124/81   04/05/17 116/56    Weight from Last 3 Encounters:   01/26/18 137 lb (62.1 kg)   12/13/17 143 lb 3.2 oz (65 kg)   04/05/17 136 lb (61.7 kg)              We Performed the Following     *UA reflex to Microscopic     ABO/Rh type and screen     Anti Treponema     CBC with platelets     Hepatitis B surface antigen     HIV Antigen Antibody Combo     Rubella Antibody IgG Quantitative     Urine Culture Aerobic Bacterial     US OB <14 Weeks w Transvaginal Single        Primary Care Provider Office Phone # Fax #    Norma Sharma [phone] [address]  [facility] [address]        Equal Access to Services     VON GILLIAM : Hadii aad ku hadasho Somisty, waaxda luqadaha, qaybta kaalmada adeegyada, cheryl gilin sebastian gupta . So [facility] [phone].    ATENCIÓN: Si habla español, tiene a kee disposición servicios gratuitos de asistencia lingüística. Ll[facility] [phone].    We comply with applicable federal civil rights laws and [place] laws. We do not discriminate on the basis of race, color, national origin, age, disability, sex, sexual orientation, or gender identity.            Thank you!     Thank you for choosing [facility]  for your care. Our goal is always to provide you with excellent care. Hearing back from our patients is one way we can continue to improve our services. Please take a few minutes to complete the written survey that you may receive in the mail after your visit with us. Thank you!             Your Updated Medication List - Protect others around you: Learn how to safely use, store and throw away your medicines at www.disposemymeds.org.          This list is accurate as of 1/26/18 11:14 AM.  Always use your most recent med list.                   Brand Name Dispense Instructions for use Diagnosis    * albuterol 108 (90 BASE) MCG/ACT Inhaler    VENTOLIN HFA    1 Inhaler    INHALE 2 PUFFS EVERY 4-6  HOURS AS NEEDED FOR SHORTNESS OF BREATH    Mild intermittent asthma without complication       * albuterol (2.5 MG/3ML) 0.083% neb solution     60 vial    Take 1 vial (2.5 mg) by nebulization every 6 hours as needed for shortness of breath / dyspnea or wheezing    Bronchitis, acute, with bronchospasm       fluticasone 100 MCG/BLIST Aepb    FLOVENT DISKUS    3 Inhaler    Inhale 1 puff into the lungs 2 times daily    Mild intermittent asthma without complication       prenatal multivitamin plus iron 27-0.8 MG Tabs per tablet      Take 1 tablet by mouth daily    Prenatal care, subsequent pregnancy in first trimester       * Notice:  This list has 2 medication(s) that are the same as other medications prescribed for you. Read the directions carefully, and ask your doctor or other care provider to review them with you.

## 2018-01-26 NOTE — PROGRESS NOTES
Discussed physician coverage, tertiary support, diet, exercise, weight gain, schedule of visits, routine and indicated ultrasounds, childbirth education and antepartum testing for certain birth defects.  Encouraged patient to review contents of Prenatal Breastfeeding Education Toolkit. Offered opportunity to answer questions regarding the importance of skin to skin contact, early initiation of exclusive breastfeeding for the first six months and rooming in while in the hospital.  Discussed [facility] travel advisory for Zika virus.  Syphilis is a sexually transmitted disease that can cause birth defects in the babies of untreated mothers. Every pregnant patient is tested for syphilis early in each pregnancy as part of the routine lab work. The [place] Department of [facility] has seen an increase in the rate of syphilis in [place]. The [facility] now recommends testing for syphilis 3 times during a pregnancy, the new prenatal visit, 28 weeks and when admitted for delivery. Patient accepts lab testing for syphilis.        Options for  testing for birth defects were discussed with the patient, generally including CVS testing, Quad screen serum testing, nuchal lucency/blood marker testing, genetic amniocentesis, Verifi testing  and/or level 2 ultrasound.    Current issues include: desires RCS with BTL    Past medical, surgical, social and family histories reviewed on OB questionaire and included on episode summary.  Pertinent review of systems items stated above. See OB questionaire for pertinent components of HPI.    Past Medical History:   Diagnosis Date     Asthma attack age 6    hospitalized as a child     Chickenpox      Papanicolaou smear of cervix with low grade squamous intraepithelial lesion (LGSIL) 2008    + HPV 16. Colpo and f/u paps NIL     Tobacco use disorder 2015    Quit date 2015 !      Urinary tract bacterial infections     age 20-22     See epic chart    Past Surgical History:   Procedure  "Laterality Date      SECTION N/A 12/10/2015    Procedure:  SECTION;  Surgeon: Norma Sharma MD;  Location: [facility] OR     MOUTH SURGERY      wisdom teeth     See epic chart    Patient Active Problem List    Diagnosis Date Noted     H/O:  2018     Priority: Medium     Prenatal care, subsequent pregnancy 2018     Priority: Medium     Mild intermittent asthma without complication 2017     Priority: Medium     CARDIOVASCULAR SCREENING; LDL GOAL LESS THAN 160 10/31/2010     Priority: Medium     GERD (gastroesophageal reflux disease) 2010     Priority: Medium     Post viral asthma 10/05/2005     Priority: Medium     Allergic rhinitis 2005     Priority: Medium     Problem list name updated by automated process. Provider to review         OBJECTIVE: /76 (BP Location: Right arm, Patient Position: Chair, Cuff Size: Adult Small)  Pulse 113  Temp 97.1  F (36.2  C) (Tympanic)  Resp 16  Ht 5' 4\" (1.626 m)  Wt 137 lb (62.1 kg)  LMP 2017  BMI 23.52 kg/m2    GENERAL APPEARANCE: healthy, alert and no distress  ABDOMEN:  soft, nontender, no hepato-splenomegaly or hernias  PELVIC:  EGBUS:  within normal limits  VAGINA:  normoestrogenic, well-supported, no unusual discharge  CERVIX:  smooth, non-friable, no gross lesions, thin-layer PAP was taken  UTERUS:  anteverted and enlarged, 9 weeks size  ADNEXAE:  non-tender, no masses palpable, no cul de sac nodularity     NECK: no adenopathy, no asymmetry, masses, or scars and thyroid normal to palpation     RESP: lungs clear to auscultation , respiratory effort WNL     CV: regular rates and rhythm, normal S1 S2, no S3 or S4 and no murmur, click or rub -     SKIN: no suspicious lesions or rashes     PSYCH: mentation appears normal. and affect normal/bright, oriented to person/place/time     LYMPHATICS: No axillary, cervical, inguinal, or supraclavicular nodes    Transvaginal ultrasound was performed. A live single " intrauterine pregnancy was seen.  CRL=2.90 cm, c/w 9 weeks, 6 days.  EDC by sono =18; this is significantly different from EDC by LMP; EDC adjusted to 18    Fetal heart motion was visualized. RNT=327 bpm            ASSESSMENT:    ICD-10-CM    1. Prenatal care, subsequent pregnancy in first trimester Z34.81 CBC with platelets     ABO/Rh type and screen     Hepatitis B surface antigen     Rubella Antibody IgG Quantitative     Urine Culture Aerobic Bacterial     *UA reflex to Microscopic     Anti Treponema     HIV Antigen Antibody Combo     US OB <14 Weeks w Transvaginal Single     Prenatal Vit-Fe Fumarate-FA (PRENATAL MULTIVITAMIN PLUS IRON) 27-0.8 MG TABS per tablet   2. H/O:  Z98.891          PLAN: see orders and OB problem list annotations    Nelda Sanders

## 2018-01-27 LAB — T PALLIDUM IGG+IGM SER QL: NEGATIVE

## 2018-01-29 LAB — RUBV IGG SERPL IA-ACNC: 74 IU/ML

## 2018-01-31 LAB
COPATH REPORT: NORMAL
PAP: NORMAL

## 2018-02-02 LAB
FINAL DIAGNOSIS: NORMAL
HPV HR 12 DNA CVX QL NAA+PROBE: NEGATIVE
HPV16 DNA SPEC QL NAA+PROBE: NEGATIVE
HPV18 DNA SPEC QL NAA+PROBE: NEGATIVE
SPECIMEN DESCRIPTION: NORMAL
SPECIMEN SOURCE CVX/VAG CYTO: NORMAL

## 2018-02-08 ENCOUNTER — MYC MEDICAL ADVICE (OUTPATIENT)
Dept: FAMILY MEDICINE | Facility: CLINIC | Age: 33
End: 2018-02-08

## 2018-02-08 ENCOUNTER — VIRTUAL VISIT (OUTPATIENT)
Dept: FAMILY MEDICINE | Facility: CLINIC | Age: 33
End: 2018-02-08
Payer: COMMERCIAL

## 2018-02-08 DIAGNOSIS — H10.31 ACUTE BACTERIAL CONJUNCTIVITIS OF RIGHT EYE: Primary | ICD-10-CM

## 2018-02-08 PROCEDURE — 99441 ZZC PHYSICIAN TELEPHONE EVALUATION 5-10 MIN: CPT | Performed by: PHYSICIAN ASSISTANT

## 2018-02-08 RX ORDER — TOBRAMYCIN 3 MG/ML
1 SOLUTION/ DROPS OPHTHALMIC EVERY 4 HOURS
Qty: 1 BOTTLE | Refills: 0 | Status: SHIPPED | OUTPATIENT
Start: 2018-02-08 | End: 2018-02-15

## 2018-02-08 RX ORDER — SULFAMETHOXAZOLE/TRIMETHOPRIM 800-160 MG
1 TABLET ORAL 2 TIMES DAILY
Qty: 6 TABLET | Refills: 0 | Status: CANCELLED | OUTPATIENT
Start: 2018-02-08 | End: 2018-02-11

## 2018-02-08 NOTE — MR AVS SNAPSHOT
After Visit Summary   2/8/2018    Katerin Jasso    MRN: [id number]           Patient Information     Date Of Birth          1985        Visit Information        Provider Department      2/8/2018 11:40 AM Jennifer Diaz PA-C [facility]        Today's Diagnoses     Acute bacterial conjunctivitis of right eye    -  1       Follow-ups after your visit        Your next 10 appointments already scheduled     Feb 21, 2018  2:30 PM CST   ESTABL[facility]    [address], [place] [address]   [phone]              Who to contact     Normal or non-critical lab and imaging results will be communicated to you by iPAYsthart, letter or phone within 4 business days after the clinic has received the results. If you do not hear from us within 7 days, please contact the clinic through MyChart or phone. If you have a critical or abnormal lab result, we will notify you by phone as soon as possible.  Submit refill requests through [employer] or call your pharmacy and they will forward the refill request to us. Please allow 3 business days for your refill to be completed.          If you need to speak with a  for additional information , please call: [phone]             Additional Information About Your Visit        Zuberance Information     [employer] gives you secure access to your electronic health record. If you see a primary care provider, you can also send messages to your care team and make appointments. If you have questions, please call your primary care clinic.  If you do not have a primary care provider, please call [phone] and they will assist you.        Care EveryWhere ID     This is your Care EveryWhere ID. This could be used by other organizations to access your [place] medical records  [id number]        Your Vitals Were     Last Period                    11/01/2017            Blood Pressure from Last 3 Encounters:   01/26/18 128/76   12/13/17 124/81   04/05/17 116/56    Weight from Last 3 Encounters:   01/26/18 137 lb (62.1 kg)   12/13/17 143 lb 3.2 oz (65 kg)   04/05/17 136 lb (61.7 kg)              Today, you had the following     No orders found for display         Today's Medication Changes          These changes are accurate as of 2/8/18 12:04 PM.  If you have any questions, ask your nurse or doctor.               Start taking these medicines.        Dose/Directions    tobramycin 0.3 % ophthalmic solution   Commonly known as:  TOBREX   Used for:  Acute bacterial conjunctivitis of right eye   Started by:  Jennifer Diaz PA-C        Dose:  1 drop   Apply 1 drop to eye every 4 hours for 7 days   Quantity:  1 Bottle   Refills:  0            Where to get your medicines      These medications were sent to [place] [address] [place] [address] [facility]  [address] [facility] [address]     Phone:  [phone]     tobramycin 0.3 % ophthalmic solution                Primary Care Provider Office Phone # Fax #    Norma Sharma [phone] [address] [facility] [address]        Equal Access to Services     VON GILLIAM AH: Hadii adriana ku hadasho Soomaali, waaxda luqadaha, qaybta kaalmada adeegyada, cheryl gilin haysalud mary. So [facility] [phone].    ATENCIÓN: Si habla español, tiene a kee disposición servicios gratuitos de asistencia lingüística. Llame al [phone].    We comply with applicable federal civil rights laws and [place] laws. We do not discriminate on the basis of race, color, national origin, age, disability, sex, sexual orientation, or gender identity.            Thank you!     Thank you for choosing [facility]  for your care. Our goal is always to provide you with excellent care. Hearing back from our patients is one way we can continue to improve our services. Please take  a few minutes to complete the written survey that you may receive in the mail after your visit with us. Thank you!             Your Updated Medication List - Protect others around you: Learn how to safely use, store and throw away your medicines at www.disposemymeds.org.          This list is accurate as of 2/8/18 12:04 PM.  Always use your most recent med list.                   Brand Name Dispense Instructions for use Diagnosis    * albuterol 108 (90 BASE) MCG/ACT Inhaler    VENTOLIN HFA    1 Inhaler    INHALE 2 PUFFS EVERY 4-6 HOURS AS NEEDED FOR SHORTNESS OF BREATH    Mild intermittent asthma without complication       * albuterol (2.5 MG/3ML) 0.083% neb solution     60 vial    Take 1 vial (2.5 mg) by nebulization every 6 hours as needed for shortness of breath / dyspnea or wheezing    Bronchitis, acute, with bronchospasm       fluticasone 100 MCG/BLIST Aepb    FLOVENT DISKUS    3 Inhaler    Inhale 1 puff into the lungs 2 times daily    Mild intermittent asthma without complication       prenatal multivitamin plus iron 27-0.8 MG Tabs per tablet      Take 1 tablet by mouth daily    Prenatal care, subsequent pregnancy in first trimester       tobramycin 0.3 % ophthalmic solution    TOBREX    1 Bottle    Apply 1 drop to eye every 4 hours for 7 days    Acute bacterial conjunctivitis of right eye       * Notice:  This list has 2 medication(s) that are the same as other medications prescribed for you. Read the directions carefully, and ask your doctor or other care provider to review them with you.

## 2018-02-08 NOTE — TELEPHONE ENCOUNTER
Patient reports that she received a letter from her son's classroom that jen is going around. This morning, she woke up and her right eye is red, and mattery. She wiped matter away and eye continues to water. She also has itching and sometimes blurred vision, but clears when she wipes her eye. Informed patient of telephone visit and discussed pricing as there are no available appointment today. She is ok with a telephone visit. Please advise if you would like me to schedule her for an office visit, or telephone visit. Thank you.  Amy Smith RN

## 2018-02-08 NOTE — PROGRESS NOTES
"Katerin Jasso is a 32 year old female who is being evaluated via a billable telephone visit.      The patient has been notified of following:     \"This telephone visit will be conducted via a call between you and your physician/provider. We have found that certain health care needs can be provided without the need for a physical exam.  This service lets us provide the care you need with a short phone conversation.  If a prescription is necessary we can send it directly to your pharmacy.  If lab work is needed we can place an order for that and you can then stop by our lab to have the test done at a later time.    We will bill your insurance company for this service.  Please check with your medical insurance if this type of visit is covered. You may be responsible for the cost of this type of visit if insurance coverage is denied.  The typical cost is $30 (10min), $59 (11-20min) and $85 (21-30min).  Most often these visits are shorter than 10 minutes.    If during the course of the call the physician/provider feels a telephone visit is not appropriate, you will not be charged for this service.\"       Consent has been obtained for this service by care team member: yes. See the scanned image in the medical record.  SUBJECTIVE:     Katerin Jasso complains of    Chief Complaint   Patient presents with     Eye Problem       I have reviewed and updated the patient's Past Medical History, Social History, Family History and Medication List.    ALLERGIES  Cipro [ciprofloxacin]; Depotest [testosterone]; Septra [bactrim]; and Latex    Javon Tobias, CAMERON      Additional provider notes: Eye Problem  Duration of complaint: This morning    Description:  Location: right  Pain: no  Redness: YES- mild   Discharge: YES- this morning   Trauma/foreign body: no  Use of contacts: no  Therapies tried and outcome: None      No changes in vision. No pain  No current cold or URI symptoms  ONly one eye currently  Constant " mattering/discharge  No fevers  Pink eye going around son's     OBJECTIVE:       ICD-10-CM    1. Acute bacterial conjunctivitis of right eye H10.31 tobramycin (TOBREX) 0.3 % ophthalmic solution     Follow up if not improving    I have reviewed the note as documented above.  This accurately captures the substance of my conversation with the patient,  Jennifer Diaz PA-C      Total time of call between patient and provider was 5 minutes     Javon Tobias

## 2018-02-21 ENCOUNTER — PRENATAL OFFICE VISIT (OUTPATIENT)
Dept: OBGYN | Facility: CLINIC | Age: 33
End: 2018-02-21
Payer: COMMERCIAL

## 2018-02-21 VITALS
HEIGHT: 64 IN | SYSTOLIC BLOOD PRESSURE: 118 MMHG | TEMPERATURE: 98.3 F | RESPIRATION RATE: 16 BRPM | WEIGHT: 140 LBS | HEART RATE: 111 BPM | DIASTOLIC BLOOD PRESSURE: 73 MMHG | BODY MASS INDEX: 23.9 KG/M2

## 2018-02-21 DIAGNOSIS — Z34.81 PRENATAL CARE, SUBSEQUENT PREGNANCY IN FIRST TRIMESTER: Primary | ICD-10-CM

## 2018-02-21 DIAGNOSIS — Z98.891 H/O: C-SECTION: ICD-10-CM

## 2018-02-21 PROCEDURE — 99207 ZZC PRENATAL VISIT: CPT | Performed by: OBSTETRICS & GYNECOLOGY

## 2018-02-21 NOTE — MR AVS SNAPSHOT
After Visit Summary   2018    Katerin Jasso    MRN: [id number]           Patient Information     Date Of Birth          1985        Visit Information        Provider Department      2018 2:30 PM Nikita Walton MD [facility]        Today's Diagnoses     Prenatal care, subsequent pregnancy in first trimester    -  1    H/O:            Follow-ups after your visit        Follow-up notes from your care team     Return in about 4 weeks (around 3/21/2018).      Your next 10 appointments already scheduled     Mar 20, 2018  3:30 PM CDT   [facility]ABL[facility]    [address] [address] [phone]              Who to contact     If you have questions or need follow up information about today's clinic visit or your schedule please contact [facility] directly at [phone].  Normal or non-critical lab and imaging results will be communicated to you by Sc[employer] Fashion[employer], letter or phone within 4 business days after the clinic has received the results. If you do not hear from us within 7 days, please contact the clinic through [employer] or phone. If you have a critical or abnormal lab result, we will notify you by phone as soon as possible.  Submit refill requests through [employer] or call your pharmacy and they will forward the refill request to us. Please allow 3 business days for your refill to be completed.          Additional Information About Your Visit        MyChart Information     [employer] gives you secure access to your electronic health record. If you see a primary care provider, you can also send messages to your care team and make appointments. If you have questions, please call your primary care clinic.  If you do not have a primary care provider, please call [phone] and they will assist you.        Care EveryWhere ID     This is your  "Care EveryWhere ID. This could be used by other organizations to access your [facility] medical records  [id number]        Your Vitals Were     Pulse Temperature Respirations Height Last Period BMI (Body Mass Index)    111 98.3  F (36.8  C) 16 1.626 m (5' 4\") 11/01/2017 24.03 kg/m2       Blood Pressure from Last 3 Encounters:   02/21/18 118/73   01/26/18 128/76   12/13/17 124/81    Weight from Last 3 Encounters:   02/21/18 63.5 kg (140 lb)   01/26/18 62.1 kg (137 lb)   12/13/17 65 kg (143 lb 3.2 oz)               Primary Care Provider Office Phone # Fax #    Norma Sharma [phone] [address] [address]        Equal Access to Services     [place] : Hadii adriana navao Somisty, waaxda luqadaha, qaybta kaalmada ademilayatatianna, cheryl gupta . So [facility] [phone].    ATENCIÓN: Si habla español, tiene a kee disposición servicios gratuitos de asistencia lingüística. Shashi al [phone].    We comply with applicable federal civil rights laws and [place] laws. We do not discriminate on the basis of race, color, national origin, age, disability, sex, sexual orientation, or gender identity.            Thank you!     Thank you for choosing [facility]  for your care. Our goal is always to provide you with excellent care. Hearing back from our patients is one way we can continue to improve our services. Please take a few minutes to complete the written survey that you may receive in the mail after your visit with us. Thank you!             Your Updated Medication List - Protect others around you: Learn how to safely use, store and throw away your medicines at www.disposemymeds.org.          This list is accurate as of 2/21/18 11:59 PM.  Always use your most recent med list.                   Brand Name Dispense Instructions for use Diagnosis    * albuterol 108 (90 BASE) MCG/ACT Inhaler    VENTOLIN HFA    1 Inhaler    INHALE 2 PUFFS EVERY " 4-6 HOURS AS NEEDED FOR SHORTNESS OF BREATH    Mild intermittent asthma without complication       * albuterol (2.5 MG/3ML) 0.083% neb solution     60 vial    Take 1 vial (2.5 mg) by nebulization every 6 hours as needed for shortness of breath / dyspnea or wheezing    Bronchitis, acute, with bronchospasm       fluticasone 100 MCG/BLIST Aepb    FLOVENT DISKUS    3 Inhaler    Inhale 1 puff into the lungs 2 times daily    Mild intermittent asthma without complication       prenatal multivitamin plus iron 27-0.8 MG Tabs per tablet      Take 1 tablet by mouth daily    Prenatal care, subsequent pregnancy in first trimester       * Notice:  This list has 2 medication(s) that are the same as other medications prescribed for you. Read the directions carefully, and ask your doctor or other care provider to review them with you.

## 2018-02-21 NOTE — NURSING NOTE
"Initial /73  Pulse 111  Temp 98.3  F (36.8  C)  Resp 16  Ht 5' 4\" (1.626 m)  Wt 140 lb (63.5 kg)  LMP 11/01/2017  BMI 24.03 kg/m2 Estimated body mass index is 24.03 kg/(m^2) as calculated from the following:    Height as of this encounter: 5' 4\" (1.626 m).    Weight as of this encounter: 140 lb (63.5 kg). .    q  "

## 2018-02-24 NOTE — PROGRESS NOTES
"CC: Prenatal visit    S: feeling better  Reviewed the labs- normal  She would like to have the quad screen done when appropriate    O: /73  Pulse 111  Temp 98.3  F (36.8  C)  Resp 16  Ht 1.626 m (5' 4\")  Wt 63.5 kg (140 lb)  LMP 11/01/2017  BMI 24.03 kg/m2  See above table    A: IUP @ 13+4 week EGA    P RTC 4 weeks  Quad screen  Nikita Walton          "

## 2018-03-20 ENCOUNTER — TRANSFERRED RECORDS (OUTPATIENT)
Dept: HEALTH INFORMATION MANAGEMENT | Facility: CLINIC | Age: 33
End: 2018-03-20

## 2018-03-20 ENCOUNTER — PRENATAL OFFICE VISIT (OUTPATIENT)
Dept: OBGYN | Facility: CLINIC | Age: 33
End: 2018-03-20
Payer: COMMERCIAL

## 2018-03-20 VITALS
RESPIRATION RATE: 16 BRPM | TEMPERATURE: 98.9 F | HEIGHT: 64 IN | HEART RATE: 109 BPM | BODY MASS INDEX: 23.42 KG/M2 | WEIGHT: 137.2 LBS | DIASTOLIC BLOOD PRESSURE: 80 MMHG | SYSTOLIC BLOOD PRESSURE: 124 MMHG

## 2018-03-20 DIAGNOSIS — Z34.82 PRENATAL CARE, SUBSEQUENT PREGNANCY IN SECOND TRIMESTER: Primary | ICD-10-CM

## 2018-03-20 LAB — MISCELLANEOUS TEST: NORMAL

## 2018-03-20 PROCEDURE — 36415 COLL VENOUS BLD VENIPUNCTURE: CPT | Performed by: OBSTETRICS & GYNECOLOGY

## 2018-03-20 PROCEDURE — 99207 ZZC PRENATAL VISIT: CPT | Performed by: OBSTETRICS & GYNECOLOGY

## 2018-03-20 PROCEDURE — 81511 FTL CGEN ABNOR FOUR ANAL: CPT | Mod: 90 | Performed by: OBSTETRICS & GYNECOLOGY

## 2018-03-20 PROCEDURE — 99000 SPECIMEN HANDLING OFFICE-LAB: CPT | Performed by: OBSTETRICS & GYNECOLOGY

## 2018-03-20 NOTE — NURSING NOTE
"Initial /80  Pulse 109  Temp 98.9  F (37.2  C)  Resp 16  Ht 5' 4\" (1.626 m)  Wt 137 lb 3.2 oz (62.2 kg)  LMP 11/01/2017  BMI 23.55 kg/m2 Estimated body mass index is 23.55 kg/(m^2) as calculated from the following:    Height as of this encounter: 5' 4\" (1.626 m).    Weight as of this encounter: 137 lb 3.2 oz (62.2 kg). .      "

## 2018-03-20 NOTE — PROGRESS NOTES
"CC: Prenatal visit    S: Feeling well    O: /80  Pulse 109  Temp 98.9  F (37.2  C)  Resp 16  Ht 5' 4\" (1.626 m)  Wt 137 lb 3.2 oz (62.2 kg)  LMP 11/01/2017  BMI 23.55 kg/m2  See above table    A: IUP @ 17+3 week EGA    P RTC 4 weeks  Quad screen  Anatomic ULTRASOUND       Nikita Walton      "

## 2018-03-20 NOTE — MR AVS SNAPSHOT
After Visit Summary   3/20/2018    Katerin Jasso    MRN: [id number]           Patient Information     Date Of Birth          1985        Visit Information        Provider Department      3/20/2018 3:30 PM Nikita Walton MD [facility]        Today's Diagnoses     Prenatal care, subsequent pregnancy in second trimester    -  1       Follow-ups after your visit        Follow-up notes from your care team     Return in about 4 weeks (around 4/17/2018).      Future tests that were ordered for you today     Open Future Orders        Priority Expected Expires Ordered    US OB > 14 Weeks Complete Single Routine  3/20/2019 3/20/2018            Who to contact     If you have questions or need follow up information about today's clinic visit or your schedule please contact [facility] directly at [phone].  Normal or non-critical lab and imaging results will be communicated to you by MyChart, letter or phone within 4 business days after the clinic has received the results. If you do not hear from us within 7 days, please contact the clinic through MyChart or phone. If you have a critical or abnormal lab result, we will notify you by phone as soon as possible.  Submit refill requests through [employer] or call your pharmacy and they will forward the refill request to us. Please allow 3 business days for your refill to be completed.          Additional Information About Your Visit        MyChart Information     [employer] gives you secure access to your electronic health record. If you see a primary care provider, you can also send messages to your care team and make appointments. If you have questions, please call your primary care clinic.  If you do not have a primary care provider, please call [phone] and they will assist you.        Care EveryWhere ID     This is your Care EveryWhere ID. This could be used by other organizations to access your [facility]  "records  [id number]        Your Vitals Were     Pulse Temperature Respirations Height Last Period BMI (Body Mass Index)    109 98.9  F (37.2  C) 16 5' 4\" (1.626 m) 11/01/2017 23.55 kg/m2       Blood Pressure from Last 3 Encounters:   03/20/18 124/80   02/21/18 118/73   01/26/18 128/76    Weight from Last 3 Encounters:   03/20/18 137 lb 3.2 oz (62.2 kg)   02/21/18 140 lb (63.5 kg)   01/26/18 137 lb (62.1 kg)              We Performed the Following     Maternal quad screen     MQMK: Laboratory Miscellaneous Order        Primary Care Provider Office Phone # Fax #    Norma Sharma [phone] [address] [address]        Equal Access to Services     [facility]: Hadii aad ku hadasho Soomaali, waaxda luqadaha, qaybta kaalmada adeegyada, waxay jefferyin haysalud gupta . So [facility] [phone].    ATENCIÓN: Si habla español, tiene a kee disposición servicios gratuitos de asistencia lingüística. Llame al [phone].    We comply with applicable federal civil rights laws and [place] laws. We do not discriminate on the basis of race, color, national origin, age, disability, sex, sexual orientation, or gender identity.            Thank you!     Thank you for choosing [facility]  for your care. Our goal is always to provide you with excellent care. Hearing back from our patients is one way we can continue to improve our services. Please take a few minutes to complete the written survey that you may receive in the mail after your visit with us. Thank you!             Your Updated Medication List - Protect others around you: Learn how to safely use, store and throw away your medicines at www.disposemymeds.org.          This list is accurate as of 3/20/18  3:48 PM.  Always use your most recent med list.                   Brand Name Dispense Instructions for use Diagnosis    * albuterol 108 (90 BASE) MCG/ACT Inhaler    VENTOLIN HFA    1 Inhaler    INHALE 2 " PUFFS EVERY 4-6 HOURS AS NEEDED FOR SHORTNESS OF BREATH    Mild intermittent asthma without complication       * albuterol (2.5 MG/3ML) 0.083% neb solution     60 vial    Take 1 vial (2.5 mg) by nebulization every 6 hours as needed for shortness of breath / dyspnea or wheezing    Bronchitis, acute, with bronchospasm       fluticasone 100 MCG/BLIST Aepb    FLOVENT DISKUS    3 Inhaler    Inhale 1 puff into the lungs 2 times daily    Mild intermittent asthma without complication       prenatal multivitamin plus iron 27-0.8 MG Tabs per tablet      Take 1 tablet by mouth daily    Prenatal care, subsequent pregnancy in first trimester       * Notice:  This list has 2 medication(s) that are the same as other medications prescribed for you. Read the directions carefully, and ask your doctor or other care provider to review them with you.

## 2018-03-29 LAB — LAB SCANNED RESULT: NORMAL

## 2018-04-04 ENCOUNTER — HOSPITAL ENCOUNTER (OUTPATIENT)
Dept: ULTRASOUND IMAGING | Facility: CLINIC | Age: 33
Discharge: HOME | End: 2018-04-04
Attending: OBSTETRICS & GYNECOLOGY | Admitting: OBSTETRICS & GYNECOLOGY
Payer: COMMERCIAL

## 2018-04-04 DIAGNOSIS — Z34.82 PRENATAL CARE, SUBSEQUENT PREGNANCY IN SECOND TRIMESTER: ICD-10-CM

## 2018-04-04 PROCEDURE — 76805 OB US >/= 14 WKS SNGL FETUS: CPT

## 2018-04-05 NOTE — PROGRESS NOTES
Your results are back and they are normal.  Your due date remains the same.  We do not change your due date based on this ULTRASOUND   Nikita Walton

## 2018-04-19 ENCOUNTER — PRENATAL OFFICE VISIT (OUTPATIENT)
Dept: OBGYN | Facility: CLINIC | Age: 33
End: 2018-04-19
Payer: COMMERCIAL

## 2018-04-19 VITALS
HEART RATE: 108 BPM | SYSTOLIC BLOOD PRESSURE: 111 MMHG | DIASTOLIC BLOOD PRESSURE: 71 MMHG | RESPIRATION RATE: 16 BRPM | BODY MASS INDEX: 23.56 KG/M2 | WEIGHT: 138 LBS | TEMPERATURE: 97.9 F | HEIGHT: 64 IN

## 2018-04-19 DIAGNOSIS — Z34.82 PRENATAL CARE, SUBSEQUENT PREGNANCY IN SECOND TRIMESTER: Primary | ICD-10-CM

## 2018-04-19 LAB
ALBUMIN UR-MCNC: NEGATIVE MG/DL
APPEARANCE UR: ABNORMAL
BACTERIA #/AREA URNS HPF: ABNORMAL /HPF
BILIRUB UR QL STRIP: NEGATIVE
COLOR UR AUTO: YELLOW
GLUCOSE UR STRIP-MCNC: NEGATIVE MG/DL
HGB UR QL STRIP: ABNORMAL
KETONES UR STRIP-MCNC: NEGATIVE MG/DL
LEUKOCYTE ESTERASE UR QL STRIP: NEGATIVE
NITRATE UR QL: NEGATIVE
NON-SQ EPI CELLS #/AREA URNS LPF: ABNORMAL /LPF
PH UR STRIP: 7 PH (ref 5–7)
RBC #/AREA URNS AUTO: ABNORMAL /HPF
SOURCE: ABNORMAL
SP GR UR STRIP: 1.01 (ref 1–1.03)
UROBILINOGEN UR STRIP-ACNC: 0.2 EU/DL (ref 0.2–1)
WBC #/AREA URNS AUTO: ABNORMAL /HPF

## 2018-04-19 PROCEDURE — 99207 ZZC PRENATAL VISIT: CPT | Performed by: OBSTETRICS & GYNECOLOGY

## 2018-04-19 PROCEDURE — 81001 URINALYSIS AUTO W/SCOPE: CPT | Performed by: OBSTETRICS & GYNECOLOGY

## 2018-04-19 PROCEDURE — 87086 URINE CULTURE/COLONY COUNT: CPT | Performed by: OBSTETRICS & GYNECOLOGY

## 2018-04-19 NOTE — MR AVS SNAPSHOT
After Visit Summary   4/19/2018    Katerin Jasso    MRN: [id number]           Patient Information     Date Of Birth          1985        Visit Information        Provider Department      4/19/2018 9:15 AM Norma Sharma MD [facility]        Today's Diagnoses     Prenatal care, subsequent pregnancy in second trimester    -  1       Follow-ups after your visit        Future tests that were ordered for you today     Open Future Orders        Priority Expected Expires Ordered    Glucose tolerance gest screen 1 hour Routine  7/19/2018 4/19/2018    Anti Treponema Routine  7/19/2018 4/19/2018    CBC with platelets Routine  7/19/2018 4/19/2018            Who to contact     If you have questions or need follow up information about today's clinic visit or your schedule please contact [facility] directly at [phone].  Normal or non-critical lab and imaging results will be communicated to you by [employer], letter or phone within 4 business days after the clinic has received the results. If you do not hear from us within 7 days, please contact the clinic through [employer] or phone. If you have a critical or abnormal lab result, we will notify you by phone as soon as possible.  Submit refill requests through [employer] or call your pharmacy and they will forward the refill request to us. Please allow 3 business days for your refill to be completed.          Additional Information About Your Visit        MyChart Information     [employer] gives you secure access to your electronic health record. If you see a primary care provider, you can also send messages to your care team and make appointments. If you have questions, please call your primary care clinic.  If you do not have a primary care provider, please call [phone] and they will assist you.        Care EveryWhere ID     This is your Care EveryWhere ID. This could be used by other organizations to access your [facility]  "medical records  [id number]        Your Vitals Were     Pulse Temperature Respirations Height Last Period Breastfeeding?    108 97.9  F (36.6  C) (Tympanic) 16 5' 4\" (1.626 m) 11/01/2017 No    BMI (Body Mass Index)                   23.69 kg/m2            Blood Pressure from Last 3 Encounters:   04/19/18 111/71   03/20/18 124/80   02/21/18 118/73    Weight from Last 3 Encounters:   04/19/18 138 lb (62.6 kg)   03/20/18 137 lb 3.2 oz (62.2 kg)   02/21/18 140 lb (63.5 kg)              We Performed the Following     *UA reflex to Microscopic     Urine Culture Aerobic Bacterial        Primary Care Provider Office Phone # Fax #    Norma Sharma [phone] [address] [facility] [address]        Equal Access to Services     [facility] : Hadii aad ku hadasho Somisty, waaxda luqadaha, qaybta kaalmada adeegyada, cheryl gupta . So [facility] [phone].    ATENCIÓN: Si habla español, tiene a kee disposición servicios gratuitos de asistencia lingüística. Llame al [phone].    We comply with applicable federal civil rights laws and [place] laws. We do not discriminate on the basis of race, color, national origin, age, disability, sex, sexual orientation, or gender identity.            Thank you!     Thank you for choosing [facility]  for your care. Our goal is always to provide you with excellent care. Hearing back from our patients is one way we can continue to improve our services. Please take a few minutes to complete the written survey that you may receive in the mail after your visit with us. Thank you!             Your Updated Medication List - Protect others around you: Learn how to safely use, store and throw away your medicines at www.disposemymeds.org.          This list is accurate as of 4/19/18  9:39 AM.  Always use your most recent med list.                   Brand Name Dispense Instructions for use Diagnosis    * albuterol 108 (90 Base) " MCG/ACT Inhaler    VENTOLIN HFA    1 Inhaler    INHALE 2 PUFFS EVERY 4-6 HOURS AS NEEDED FOR SHORTNESS OF BREATH    Mild intermittent asthma without complication       * albuterol (2.5 MG/3ML) 0.083% neb solution     60 vial    Take 1 vial (2.5 mg) by nebulization every 6 hours as needed for shortness of breath / dyspnea or wheezing    Bronchitis, acute, with bronchospasm       fluticasone 100 MCG/BLIST Aepb    FLOVENT DISKUS    3 Inhaler    Inhale 1 puff into the lungs 2 times daily    Mild intermittent asthma without complication       prenatal multivitamin plus iron 27-0.8 MG Tabs per tablet      Take 1 tablet by mouth daily    Prenatal care, subsequent pregnancy in first trimester       * Notice:  This list has 2 medication(s) that are the same as other medications prescribed for you. Read the directions carefully, and ask your doctor or other care provider to review them with you.

## 2018-04-19 NOTE — PROGRESS NOTES
"CC: Here for routine prenatal visit @ 21w5d   HPI: + FM, no ctx, no LOF, no VB.  No complaints.     PE: /71 (BP Location: Left arm, Patient Position: Sitting, Cuff Size: Adult Regular)  Pulse 108  Temp 97.9  F (36.6  C) (Tympanic)  Resp 16  Ht 5' 4\" (1.626 m)  Wt 138 lb (62.6 kg)  LMP 2017  Breastfeeding? No  BMI 23.69 kg/m2   See OB flowsheet    U/S WNL    A/P  @ 21w5d normal pregnancy    1. Routine prenatal care    RTC 4 weeks.      Norma Sharma M.D.    "

## 2018-04-19 NOTE — NURSING NOTE
"Chief Complaint   Patient presents with     Prenatal Care       Initial /71 (BP Location: Left arm, Patient Position: Sitting, Cuff Size: Adult Regular)  Pulse 108  Temp 97.9  F (36.6  C) (Tympanic)  Resp 16  Ht 5' 4\" (1.626 m)  Wt 138 lb (62.6 kg)  LMP 11/01/2017  Breastfeeding? No  BMI 23.69 kg/m2 Estimated body mass index is 23.69 kg/(m^2) as calculated from the following:    Height as of this encounter: 5' 4\" (1.626 m).    Weight as of this encounter: 138 lb (62.6 kg).  Medication Reconciliation: complete   Renetta John CMA      "

## 2018-04-20 LAB
BACTERIA SPEC CULT: NORMAL
Lab: NORMAL
SPECIMEN SOURCE: NORMAL

## 2018-05-15 ENCOUNTER — PRENATAL OFFICE VISIT (OUTPATIENT)
Dept: OBGYN | Facility: CLINIC | Age: 33
End: 2018-05-15
Payer: COMMERCIAL

## 2018-05-15 VITALS
BODY MASS INDEX: 24.24 KG/M2 | DIASTOLIC BLOOD PRESSURE: 66 MMHG | HEART RATE: 108 BPM | RESPIRATION RATE: 16 BRPM | SYSTOLIC BLOOD PRESSURE: 111 MMHG | TEMPERATURE: 97.3 F | WEIGHT: 142 LBS | HEIGHT: 64 IN

## 2018-05-15 DIAGNOSIS — Z98.891 H/O: C-SECTION: ICD-10-CM

## 2018-05-15 DIAGNOSIS — Z34.82 PRENATAL CARE, SUBSEQUENT PREGNANCY IN SECOND TRIMESTER: Primary | ICD-10-CM

## 2018-05-15 DIAGNOSIS — Z30.2 ENCOUNTER FOR STERILIZATION: ICD-10-CM

## 2018-05-15 DIAGNOSIS — Z34.82 PRENATAL CARE, SUBSEQUENT PREGNANCY IN SECOND TRIMESTER: ICD-10-CM

## 2018-05-15 LAB
ERYTHROCYTE [DISTWIDTH] IN BLOOD BY AUTOMATED COUNT: 13.3 % (ref 10–15)
GLUCOSE 1H P 50 G GLC PO SERPL-MCNC: 144 MG/DL (ref 60–129)
HCT VFR BLD AUTO: 32.2 % (ref 35–47)
HGB BLD-MCNC: 10.9 G/DL (ref 11.7–15.7)
MCH RBC QN AUTO: 32.3 PG (ref 26.5–33)
MCHC RBC AUTO-ENTMCNC: 33.9 G/DL (ref 31.5–36.5)
MCV RBC AUTO: 96 FL (ref 78–100)
PLATELET # BLD AUTO: 252 10E9/L (ref 150–450)
RBC # BLD AUTO: 3.37 10E12/L (ref 3.8–5.2)
WBC # BLD AUTO: 12.6 10E9/L (ref 4–11)

## 2018-05-15 PROCEDURE — 36415 COLL VENOUS BLD VENIPUNCTURE: CPT | Performed by: OBSTETRICS & GYNECOLOGY

## 2018-05-15 PROCEDURE — 82950 GLUCOSE TEST: CPT | Performed by: OBSTETRICS & GYNECOLOGY

## 2018-05-15 PROCEDURE — 85027 COMPLETE CBC AUTOMATED: CPT | Performed by: OBSTETRICS & GYNECOLOGY

## 2018-05-15 PROCEDURE — 99207 ZZC PRENATAL VISIT: CPT | Performed by: OBSTETRICS & GYNECOLOGY

## 2018-05-15 PROCEDURE — 86780 TREPONEMA PALLIDUM: CPT | Performed by: OBSTETRICS & GYNECOLOGY

## 2018-05-15 NOTE — NURSING NOTE
"Initial /66  Pulse 108  Temp 97.3  F (36.3  C)  Resp 16  Ht 5' 4\" (1.626 m)  Wt 142 lb (64.4 kg)  LMP 11/01/2017  BMI 24.37 kg/m2 Estimated body mass index is 24.37 kg/(m^2) as calculated from the following:    Height as of this encounter: 5' 4\" (1.626 m).    Weight as of this encounter: 142 lb (64.4 kg). .      "

## 2018-05-15 NOTE — MR AVS SNAPSHOT
After Visit Summary   5/15/2018    Katerin Jasso    MRN: [id number]           Patient Information     Date Of Birth          1985        Visit Information        Provider Department      5/15/2018 9:30 AM Nikita Walton MD [facility]        Today's Diagnoses     Prenatal care, subsequent pregnancy in second trimester    -  1    H/O:         Encounter for sterilization           Follow-ups after your visit        Follow-up notes from your care team     Return in about 4 weeks (around 2018).      Who to contact     If you have questions or need follow up information about today's clinic visit or your schedule please contact [facility] directly at [phone].  Normal or non-critical lab and imaging results will be communicated to you by MyChart, letter or phone within 4 business days after the clinic has received the results. If you do not hear from us within 7 days, please contact the clinic through Pel[employer] Technologyhart or phone. If you have a critical or abnormal lab result, we will notify you by phone as soon as possible.  Submit refill requests through Dis[employer] CK or call your pharmacy and they will forward the refill request to us. Please allow 3 business days for your refill to be completed.          Additional Information About Your Visit        MyChart Information     Dis[employer] gives you secure access to your electronic health record. If you see a primary care provider, you can also send messages to your care team and make appointments. If you have questions, please call your primary care clinic.  If you do not have a primary care provider, please call [phone] and they will assist you.        Care EveryWhere ID     This is your Care EveryWhere ID. This could be used by other organizations to access your [facility] medical records  [id number]        Your Vitals Were     Pulse Temperature Respirations Height Last Period BMI (Body Mass Index)    108  "97.3  F (36.3  C) 16 5' 4\" (1.626 m) 11/01/2017 24.37 kg/m2       Blood Pressure from Last 3 Encounters:   05/15/18 111/66   04/19/18 111/71   03/20/18 124/80    Weight from Last 3 Encounters:   05/15/18 142 lb (64.4 kg)   04/19/18 138 lb (62.6 kg)   03/20/18 137 lb 3.2 oz (62.2 kg)              Today, you had the following     No orders found for display       Primary Care Provider Fax #    Physician No Ref-Primary [phone]       No address on file        Equal Access to Services     GERMÁN GILLIAM : Sabrina Ramirez, ethan vasquez, laura lerma, cheryl gupta . So [facility] [phone].    ATENCIÓN: Si habla español, tiene a kee disposición servicios gratuitos de asistencia lingüística. Llame al [phone].    We comply with applicable federal civil rights laws and [place] laws. We do not discriminate on the basis of race, color, national origin, age, disability, sex, sexual orientation, or gender identity.            Thank you!     Thank you for choosing [facility]  for your care. Our goal is always to provide you with excellent care. Hearing back from our patients is one way we can continue to improve our services. Please take a few minutes to complete the written survey that you may receive in the mail after your visit with us. Thank you!             Your Updated Medication List - Protect others around you: Learn how to safely use, store and throw away your medicines at www.disposemymeds.org.          This list is accurate as of 5/15/18 10:03 AM.  Always use your most recent med list.                   Brand Name Dispense Instructions for use Diagnosis    * albuterol 108 (90 Base) MCG/ACT Inhaler    VENTOLIN HFA    1 Inhaler    INHALE 2 PUFFS EVERY 4-6 HOURS AS NEEDED FOR SHORTNESS OF BREATH    Mild intermittent asthma without complication       * albuterol (2.5 MG/3ML) 0.083% neb solution     60 vial    Take 1 vial (2.5 mg) by " nebulization every 6 hours as needed for shortness of breath / dyspnea or wheezing    Bronchitis, acute, with bronchospasm       fluticasone 100 MCG/BLIST Aepb    FLOVENT DISKUS    3 Inhaler    Inhale 1 puff into the lungs 2 times daily    Mild intermittent asthma without complication       prenatal multivitamin plus iron 27-0.8 MG Tabs per tablet      Take 1 tablet by mouth daily    Prenatal care, subsequent pregnancy in first trimester       * Notice:  This list has 2 medication(s) that are the same as other medications prescribed for you. Read the directions carefully, and ask your doctor or other care provider to review them with you.

## 2018-05-15 NOTE — PROGRESS NOTES
"CC: Prenatal visit    S: She is having her GCT today  O: /66  Pulse 108  Temp 97.3  F (36.3  C)  Resp 16  Ht 5' 4\" (1.626 m)  Wt 142 lb (64.4 kg)  LMP 11/01/2017  BMI 24.37 kg/m2  See above table    A: IUP @ 25+3 week EGA  Hx of previous CS  Desires repeat CS and PPTL    P RTC 4 weeks  GCT today  Nikita Walton      "

## 2018-05-16 DIAGNOSIS — Z34.82 PRENATAL CARE, SUBSEQUENT PREGNANCY IN SECOND TRIMESTER: ICD-10-CM

## 2018-05-16 LAB
GLUCOSE 1H P 100 G GLC PO SERPL-MCNC: 128 MG/DL (ref 60–179)
GLUCOSE 2H P 100 G GLC PO SERPL-MCNC: 99 MG/DL (ref 60–154)
GLUCOSE 3H P 100 G GLC PO SERPL-MCNC: 82 MG/DL (ref 60–139)
GLUCOSE P FAST SERPL-MCNC: 78 MG/DL (ref 60–94)
T PALLIDUM AB SER QL: NONREACTIVE

## 2018-05-16 PROCEDURE — 82951 GLUCOSE TOLERANCE TEST (GTT): CPT | Performed by: OBSTETRICS & GYNECOLOGY

## 2018-05-16 PROCEDURE — 36415 COLL VENOUS BLD VENIPUNCTURE: CPT | Performed by: OBSTETRICS & GYNECOLOGY

## 2018-05-16 PROCEDURE — 82952 GTT-ADDED SAMPLES: CPT | Performed by: OBSTETRICS & GYNECOLOGY

## 2018-06-12 ENCOUNTER — TELEPHONE (OUTPATIENT)
Dept: OBGYN | Facility: CLINIC | Age: 33
End: 2018-06-12

## 2018-06-12 ENCOUNTER — PRENATAL OFFICE VISIT (OUTPATIENT)
Dept: OBGYN | Facility: CLINIC | Age: 33
End: 2018-06-12
Payer: COMMERCIAL

## 2018-06-12 VITALS
HEART RATE: 116 BPM | DIASTOLIC BLOOD PRESSURE: 66 MMHG | BODY MASS INDEX: 24.65 KG/M2 | RESPIRATION RATE: 16 BRPM | WEIGHT: 144.4 LBS | SYSTOLIC BLOOD PRESSURE: 106 MMHG | HEIGHT: 64 IN | TEMPERATURE: 97.9 F

## 2018-06-12 DIAGNOSIS — Z30.2 ENCOUNTER FOR STERILIZATION: ICD-10-CM

## 2018-06-12 DIAGNOSIS — Z34.82 PRENATAL CARE, SUBSEQUENT PREGNANCY IN SECOND TRIMESTER: Primary | ICD-10-CM

## 2018-06-12 DIAGNOSIS — Z98.891 H/O: C-SECTION: ICD-10-CM

## 2018-06-12 PROCEDURE — 99207 ZZC PRENATAL VISIT: CPT | Performed by: OBSTETRICS & GYNECOLOGY

## 2018-06-12 PROCEDURE — 90715 TDAP VACCINE 7 YRS/> IM: CPT | Performed by: OBSTETRICS & GYNECOLOGY

## 2018-06-12 PROCEDURE — 90471 IMMUNIZATION ADMIN: CPT | Performed by: OBSTETRICS & GYNECOLOGY

## 2018-06-12 NOTE — MR AVS SNAPSHOT
After Visit Summary   2018    Katerin Jasso    MRN: [id number]           Patient Information     Date Of Birth          1985        Visit Information        Provider Department      2018 11:00 AM Nikita Walton MD [facility]        Today's Diagnoses     Prenatal care, subsequent pregnancy in second trimester    -  1    H/O:         Encounter for sterilization           Follow-ups after your visit        Follow-up notes from your care team     Return in about 2 weeks (around 2018).      Your next 10 appointments already scheduled     2018  9:30 AM CDT   ESTABLISHED PR[facility]AT[facility] with Nikita [facility] MD   [facility]    [address] [address]   [phone]            Jul 10, 2018  9:45 AM CDT   ESTABLISHED PRENATAL with Nikita Walton MD   [facility]    [address] [place] [address]   [phone]            2018  9:45 AM CDT   ESTABLISHED PRENATAL with Nikita Walton MD   [facility]    [address] [address]   [phone]            Aug 07, 2018 10:00 AM CDT   ESTABLISHED PRENATAL with Nikita Walton MD   [facility] ([facility])    [address] [place] [address]   [phone]            Aug 14, 2018  9:30 AM CDT   ESTABLISHED PRENATAL with Nikita Walton MD   [facility]    [address] [place] [address]   [phone]            Aug 17, 2018   Procedure with Nikita Walton MD   [facility] (--)    [address] [place] [address]   [phone]           The medical center is located at [address] [place] [address]. (between [address] in [place], [place] north of [place]  [place]).              Who to contact     If you have questions or need follow up information about today's clinic visit or your schedule please contact [facility] directly at [phone].  Normal or non-critical lab and imaging results will be communicated to you by MyChart, letter or phone within 4 business days after the clinic has received the results. If you do not hear from us within 7 days, please contact the clinic through MyChart or phone. If you have a critical or abnormal lab result, we will notify you by phone as soon as possible.  Submit refill requests through [employer] Android or call your pharmacy and they will forward the refill request to us. Please allow 3 business days for your refill to be completed.          Additional Information About Your Visit        "[email or web address]hart Information     F[employer] Android gives you secure access to your electronic health record. If you see a primary care provider, you can also send messages to your care team and make appointments. If you have questions, please call your primary care clinic.  If you do not have a primary care provider, please call [phone] and they will assist you.        Care EveryWhere ID     This is your Care EveryWhere ID. This could be used by other organizations to access your [facility] medical records  [id number]        Your Vitals Were     Pulse Temperature Respirations Height Last Period BMI (Body Mass Index)    116 97.9  F (36.6  C) 16 5' 4\" (1.626 m) 11/01/2017 24.79 kg/m2       Blood Pressure from Last 3 Encounters:   06/12/18 106/66   05/15/18 111/66   04/19/18 111/71    Weight from Last 3 Encounters:   06/12/18 144 lb 6.4 oz (65.5 kg)   05/15/18 142 lb (64.4 kg)   04/19/18 138 lb (62.6 kg)              We Performed the Following     Missy-Operative Worksheet     TDAP VACCINE (ADACEL)     VACCINE ADMINISTRATION, INITIAL        Primary Care Provider Fax #    Physician No Ref-Primary [phone]       No address on file        Equal Access to " Services     [facility]: Hadii aad ku hadsilcarli Nievesmisty, waaxda luqadaha, qaybta kaalmada maria guadalupe, cheryl gupta . So [facility] [phone].    ATENCIÓN: Si leanne finley, tiene a kee disposición servicios gratuitos de asistencia lingüística. Llame al [phone].    We comply with applicable federal civil rights laws and [place] laws. We do not discriminate on the basis of race, color, national origin, age, disability, sex, sexual orientation, or gender identity.            Thank you!     Thank you for choosing [facility]  for your care. Our goal is always to provide you with excellent care. Hearing back from our patients is one way we can continue to improve our services. Please take a few minutes to complete the written survey that you may receive in the mail after your visit with us. Thank you!             Your Updated Medication List - Protect others around you: Learn how to safely use, store and throw away your medicines at www.disposemymeds.org.          This list is accurate as of 6/12/18  5:00 PM.  Always use your most recent med list.                   Brand Name Dispense Instructions for use Diagnosis    * albuterol 108 (90 Base) MCG/ACT Inhaler    VENTOLIN HFA    1 Inhaler    INHALE 2 PUFFS EVERY 4-6 HOURS AS NEEDED FOR SHORTNESS OF BREATH    Mild intermittent asthma without complication       * albuterol (2.5 MG/3ML) 0.083% neb solution     60 vial    Take 1 vial (2.5 mg) by nebulization every 6 hours as needed for shortness of breath / dyspnea or wheezing    Bronchitis, acute, with bronchospasm       fluticasone 100 MCG/BLIST Aepb    FLOVENT DISKUS    3 Inhaler    Inhale 1 puff into the lungs 2 times daily    Mild intermittent asthma without complication       prenatal multivitamin plus iron 27-0.8 MG Tabs per tablet      Take 1 tablet by mouth daily    Prenatal care, subsequent pregnancy in first trimester       * Notice:  This list has 2 medication(s) that  are the same as other medications prescribed for you. Read the directions carefully, and ask your doctor or other care provider to review them with you.

## 2018-06-12 NOTE — TELEPHONE ENCOUNTER
Type of surgery: C Sec  Location of surgery: [place] OR  Date and time of surgery: 8/17/18 @ 7:30am  Surgeon: DIAMOND  Pre-Op Appt Date: na  Post-Op Appt Date: 6 weeks   Packet sent out: Yes  Pre-cert/Authorization completed:  Not Applicable  Date: 6/12/18

## 2018-06-12 NOTE — NURSING NOTE
"Initial /66  Pulse 116  Temp 97.9  F (36.6  C)  Resp 16  Ht 5' 4\" (1.626 m)  Wt 144 lb 6.4 oz (65.5 kg)  LMP 11/01/2017  BMI 24.79 kg/m2 Estimated body mass index is 24.79 kg/(m^2) as calculated from the following:    Height as of this encounter: 5' 4\" (1.626 m).    Weight as of this encounter: 144 lb 6.4 oz (65.5 kg). .    Screening Questionnaire for Adult Immunization    Are you sick today?   No   Do you have allergies to medications, food, a vaccine component or latex?   Yes   Have you ever had a serious reaction after receiving a vaccination?   No   Do you have a long-term health problem with heart disease, lung disease, asthma, kidney disease, metabolic disease (e.g. diabetes), anemia, or other blood disorder?   Yes   Do you have cancer, leukemia, HIV/AIDS, or any other immune system problem?   No   In the past 3 months, have you taken medications that affect  your immune system, such as prednisone, other steroids, or anticancer drugs; drugs for the treatment of rheumatoid arthritis, Crohn s disease, or psoriasis; or have you had radiation treatments?   No   Have you had a seizure, or a brain or other nervous system problem?   No   During the past year, have you received a transfusion of blood or blood     products, or been given immune (gamma) globulin or antiviral drug?   No   For women: Are you pregnant or is there a chance you could become        pregnant during the next month?   Yes   Have you received any vaccinations in the past 4 weeks?   No     Immunization questionnaire was positive for at least one answer.        Per orders of Dr. BLUNT, injection of TDAP given by Bryanna Bal. Patient instructed to remain in clinic for 15 minutes afterwards, and to report any adverse reaction to me immediately.       Screening performed by Bryanna Bal on 6/12/2018 at 11:03 AM.    "

## 2018-06-12 NOTE — PROGRESS NOTES
"CC: Prenatal visit    S: feeling well except for low back pain  Good FM   NO LOF   No VB  O: /66  Pulse 116  Temp 97.9  F (36.6  C)  Resp 16  Ht 5' 4\" (1.626 m)  Wt 144 lb 6.4 oz (65.5 kg)  LMP 2017  BMI 24.79 kg/m2  See above table    A: IUP @ 29+3 week EGA  Back pain  1. Prenatal care, subsequent pregnancy in second trimester    2. H/O:     3. Encounter for sterilization       P RTC 2 weeks  Pregnancy belt  Repeat CS and PPTL on 2018  Nikita Walton      "

## 2018-06-26 ENCOUNTER — PRENATAL OFFICE VISIT (OUTPATIENT)
Dept: OBGYN | Facility: CLINIC | Age: 33
End: 2018-06-26
Payer: COMMERCIAL

## 2018-06-26 VITALS
BODY MASS INDEX: 25.13 KG/M2 | TEMPERATURE: 97.8 F | DIASTOLIC BLOOD PRESSURE: 75 MMHG | HEART RATE: 112 BPM | RESPIRATION RATE: 16 BRPM | WEIGHT: 147.2 LBS | SYSTOLIC BLOOD PRESSURE: 117 MMHG | HEIGHT: 64 IN

## 2018-06-26 DIAGNOSIS — Z34.83 PRENATAL CARE, SUBSEQUENT PREGNANCY IN THIRD TRIMESTER: Primary | ICD-10-CM

## 2018-06-26 PROCEDURE — 99207 ZZC PRENATAL VISIT: CPT | Performed by: OBSTETRICS & GYNECOLOGY

## 2018-06-26 NOTE — NURSING NOTE
"Initial /75  Pulse 112  Temp 97.8  F (36.6  C)  Resp 16  Ht 5' 4\" (1.626 m)  Wt 147 lb 3.2 oz (66.8 kg)  LMP 11/01/2017  BMI 25.27 kg/m2 Estimated body mass index is 25.27 kg/(m^2) as calculated from the following:    Height as of this encounter: 5' 4\" (1.626 m).    Weight as of this encounter: 147 lb 3.2 oz (66.8 kg). .      "

## 2018-06-26 NOTE — PROGRESS NOTES
"CC: Prenatal visit    S: good FM  No LOF   No Vb  O: /75  Pulse 112  Temp 97.8  F (36.6  C)  Resp 16  Ht 5' 4\" (1.626 m)  Wt 147 lb 3.2 oz (66.8 kg)  LMP 11/01/2017  BMI 25.27 kg/m2  See above table    A: IUP @ 31+3 week EGA    P RTC 2 weeks    Nikita Walton      "

## 2018-06-26 NOTE — MR AVS SNAPSHOT
After Visit Summary   6/26/2018    Katerin Jasso    MRN: [id number]           Patient Information     Date Of Birth          1985        Visit Information        Provider Department      6/26/2018 9:30 AM Nikita Walton MD [facility]        Today's Diagnoses     Prenatal care, subsequent pregnancy in third trimester    -  1       Follow-ups after your visit        Follow-up notes from your care team     Return in about 2 weeks (around 7/10/2018).      Your next 10 appointments already scheduled     Jul 10, 2018  9:45 AM CDT   ESTABLISHED PRENATAL with Nikita Walton MD   [facility]    [address] [address]   [phone]            Jul 24, 2018  9:45 AM CDT   ESTABLISHED PRENATAL with Nikita Walton MD   [facility] ([facility])    [address] [address]   [phone]            Aug 07, 2018 10:00 AM CDT   ESTABLISHED PRENATAL with Nikita Walton MD   [facility]    [address] [facility] [address]   [phone]            Aug 14, 2018  9:30 AM CDT   ESTABLISHED PRENATAL with Nikita Walton MD   [facility] ([facility])    [address] [address]   [phone]            Aug 17, 2018   Procedure with Nikita Walton MD   [facility] (--)    [address] [address]   [phone]           The medical center is located at [address]. (between [address] in [place], four miles north of [place]).              Who to contact     If you have questions or need follow up information about today's clinic visit or your schedule please contact [facility] directly at [phone].  Normal or non-critical lab and imaging results will be  "communicated to you by [employer], letter or phone within 4 business days after the clinic has received the results. If you do not hear from us within 7 days, please contact the clinic through [employer] or phone. If you have a critical or abnormal lab result, we will notify you by phone as soon as possible.  Submit refill requests through [employer] or call your pharmacy and they will forward the refill request to us. Please allow 3 business days for your refill to be completed.          Additional Information About Your Visit        [employer] Information     [employer] gives you secure access to your electronic health record. If you see a primary care provider, you can also send messages to your care team and make appointments. If you have questions, please call your primary care clinic.  If you do not have a primary care provider, please call [phone] and they will assist you.        Care EveryWhere ID     This is your Care EveryWhere ID. This could be used by other organizations to access your [facility] medical records  [id number]        Your Vitals Were     Pulse Temperature Respirations Height Last Period BMI (Body Mass Index)    112 97.8  F (36.6  C) 16 5' 4\" (1.626 m) 11/01/2017 25.27 kg/m2       Blood Pressure from Last 3 Encounters:   06/26/18 117/75   06/12/18 106/66   05/15/18 111/66    Weight from Last 3 Encounters:   06/26/18 147 lb 3.2 oz (66.8 kg)   06/12/18 144 lb 6.4 oz (65.5 kg)   05/15/18 142 lb (64.4 kg)              Today, you had the following     No orders found for display       Primary Care Provider Fax #    Physician No Ref-Primary [phone]       No address on file        Equal Access to Services     VON GILLIAM : Hadii adriana Ramirez, shirleyda pedro, qaybta kaalcheryl chapa. So [facility] [phone].    ATENCIÓN: Si habla español, tiene a kee disposición servicios gratuitos de asistencia lingüística. Llame al [phone].    We comply with " applicable federal civil rights laws and [place] laws. We do not discriminate on the basis of race, color, national origin, age, disability, sex, sexual orientation, or gender identity.            Thank you!     Thank you for choosing [facility]  for your care. Our goal is always to provide you with excellent care. Hearing back from our patients is one way we can continue to improve our services. Please take a few minutes to complete the written survey that you may receive in the mail after your visit with us. Thank you!             Your Updated Medication List - Protect others around you: Learn how to safely use, store and throw away your medicines at www.disposemymeds.org.          This list is accurate as of 6/26/18 10:20 AM.  Always use your most recent med list.                   Brand Name Dispense Instructions for use Diagnosis    * albuterol 108 (90 Base) MCG/ACT Inhaler    VENTOLIN HFA    1 Inhaler    INHALE 2 PUFFS EVERY 4-6 HOURS AS NEEDED FOR SHORTNESS OF BREATH    Mild intermittent asthma without complication       * albuterol (2.5 MG/3ML) 0.083% neb solution     60 vial    Take 1 vial (2.5 mg) by nebulization every 6 hours as needed for shortness of breath / dyspnea or wheezing    Bronchitis, acute, with bronchospasm       fluticasone 100 MCG/BLIST Aepb    FLOVENT DISKUS    3 Inhaler    Inhale 1 puff into the lungs 2 times daily    Mild intermittent asthma without complication       prenatal multivitamin plus iron 27-0.8 MG Tabs per tablet      Take 1 tablet by mouth daily    Prenatal care, subsequent pregnancy in first trimester       * Notice:  This list has 2 medication(s) that are the same as other medications prescribed for you. Read the directions carefully, and ask your doctor or other care provider to review them with you.

## 2018-07-10 ENCOUNTER — PRENATAL OFFICE VISIT (OUTPATIENT)
Dept: OBGYN | Facility: CLINIC | Age: 33
End: 2018-07-10
Payer: COMMERCIAL

## 2018-07-10 VITALS
BODY MASS INDEX: 25.4 KG/M2 | RESPIRATION RATE: 16 BRPM | WEIGHT: 148.8 LBS | SYSTOLIC BLOOD PRESSURE: 133 MMHG | HEIGHT: 64 IN | HEART RATE: 113 BPM | TEMPERATURE: 98 F | DIASTOLIC BLOOD PRESSURE: 75 MMHG

## 2018-07-10 DIAGNOSIS — Z34.83 PRENATAL CARE, SUBSEQUENT PREGNANCY IN THIRD TRIMESTER: Primary | ICD-10-CM

## 2018-07-10 DIAGNOSIS — Z98.891 H/O: C-SECTION: ICD-10-CM

## 2018-07-10 DIAGNOSIS — Z30.2 ENCOUNTER FOR STERILIZATION: ICD-10-CM

## 2018-07-10 PROCEDURE — 99207 ZZC PRENATAL VISIT: CPT | Performed by: OBSTETRICS & GYNECOLOGY

## 2018-07-10 NOTE — PROGRESS NOTES
"CC: Prenatal visit    S: Feeling well   Good FM   Np LOF  No VB  Discussed sterilization with her repeat CS  O: /75  Pulse 113  Temp 98  F (36.7  C)  Resp 16  Ht 5' 4\" (1.626 m)  Wt 148 lb 12.8 oz (67.5 kg)  LMP 11/01/2017  BMI 25.54 kg/m2  See above table    A: IUP @ 33+3 week EGA    P RTC 2 weeks    Nikita Walton      "

## 2018-07-10 NOTE — MR AVS SNAPSHOT
After Visit Summary   7/10/2018    Katerin Jasso    MRN: [id number]           Patient Information     Date Of Birth          1985        Visit Information        Provider Department      7/10/2018 9:45 AM Nikita Walton MD [facility]        Today's Diagnoses     Prenatal care, subsequent pregnancy in third trimester    -  1    H/O:         Encounter for sterilization           Follow-ups after your visit        Follow-up notes from your care team     Return in about 2 weeks (around 2018).      Your next 10 appointments already scheduled     2018  9:45 AM CDT   ESTABLISHED PRENATAL with Nikita Walton MD   [facility] ([facility])    [address] [address] [phone]            Aug 07, 2018 10:00 AM CDT   ESTABLISHED PRENATAL with Nikita Walton MD   [facility] ([facility])    [address] [address] [phone]            Aug 14, 2018  9:30 AM CDT   ESTABLISHED PRENATAL with Nikita Walton MD   [facility] ([facility])    [address] [address]   [phone]            Aug 17, 2018   Procedure with Nikita Walton MD   [facility] (--)    [address] [place] [address]   [phone]           The medical center is located at [address] [place] [address]. (between [address] in [place], [place] miles north of [place]).              Who to contact     If you have questions or need follow up information about today's clinic visit or your schedule please contact [facility] directly at [phone].  Normal or non-critical lab and imaging results will be communicated to you by MyChart, letter or phone within 4 business days after the clinic has received the results. If you do not hear from us within 7 days, please  "contact the clinic through [employer] or phone. If you have a critical or abnormal lab result, we will notify you by phone as soon as possible.  Submit refill requests through [employer] [facility] or call your pharmacy and they will forward the refill request to us. Please allow 3 business days for your refill to be completed.          Additional Information About Your Visit        [employer] Information     [employer] [facility] gives you secure access to your electronic health record. If you see a primary care provider, you can also send messages to your care team and make appointments. If you have questions, please call your primary care clinic.  If you do not have a primary care provider, please call [phone] and they will assist you.        Care EveryWhere ID     This is your Care EveryWhere ID. This could be used by other organizations to access your [place] medical records  [id number]        Your Vitals Were     Pulse Temperature Respirations Height Last Period BMI (Body Mass Index)    113 98  F (36.7  C) 16 5' 4\" (1.626 m) 11/01/2017 25.54 kg/m2       Blood Pressure from Last 3 Encounters:   07/10/18 133/75   06/26/18 117/75   06/12/18 106/66    Weight from Last 3 Encounters:   07/10/18 148 lb 12.8 oz (67.5 kg)   06/26/18 147 lb 3.2 oz (66.8 kg)   06/12/18 144 lb 6.4 oz (65.5 kg)              Today, you had the following     No orders found for display       Primary Care Provider Fax #    Physician No Ref-Primary [phone]       No address on file        Equal Access to Services     VON GILLIAM : Hadii aad ku hadasho Soomaali, waaxda luqadaha, qaybta kaalmada adeegyada, waxay torsten gupta . So [facility] [phone].    ATENCIÓN: Si habla español, tiene a kee disposición servicios gratuitos de asistencia lingüística. Llame al [phone].    We comply with applicable federal civil rights laws and [place] laws. We do not discriminate on the basis of race, color, national origin, age, disability, sex, sexual " orientation, or gender identity.            Thank you!     Thank you for choosing [facility]  for your care. Our goal is always to provide you with excellent care. Hearing back from our patients is one way we can continue to improve our services. Please take a few minutes to complete the written survey that you may receive in the mail after your visit with us. Thank you!             Your Updated Medication List - Protect others around you: Learn how to safely use, store and throw away your medicines at www.disposemymeds.org.          This list is accurate as of 7/10/18 10:00 AM.  Always use your most recent med list.                   Brand Name Dispense Instructions for use Diagnosis    * albuterol 108 (90 Base) MCG/ACT Inhaler    VENTOLIN HFA    1 Inhaler    INHALE 2 PUFFS EVERY 4-6 HOURS AS NEEDED FOR SHORTNESS OF BREATH    Mild intermittent asthma without complication       * albuterol (2.5 MG/3ML) 0.083% neb solution     60 vial    Take 1 vial (2.5 mg) by nebulization every 6 hours as needed for shortness of breath / dyspnea or wheezing    Bronchitis, acute, with bronchospasm       fluticasone 100 MCG/BLIST Aepb    FLOVENT DISKUS    3 Inhaler    Inhale 1 puff into the lungs 2 times daily    Mild intermittent asthma without complication       prenatal multivitamin plus iron 27-0.8 MG Tabs per tablet      Take 1 tablet by mouth daily    Prenatal care, subsequent pregnancy in first trimester       * Notice:  This list has 2 medication(s) that are the same as other medications prescribed for you. Read the directions carefully, and ask your doctor or other care provider to review them with you.

## 2018-07-11 ENCOUNTER — MYC MEDICAL ADVICE (OUTPATIENT)
Dept: OBGYN | Facility: CLINIC | Age: 33
End: 2018-07-11

## 2018-07-11 DIAGNOSIS — J45.20 MILD INTERMITTENT ASTHMA WITHOUT COMPLICATION: ICD-10-CM

## 2018-07-11 DIAGNOSIS — K21.9 GASTROESOPHAGEAL REFLUX DISEASE WITHOUT ESOPHAGITIS: Primary | ICD-10-CM

## 2018-07-11 RX ORDER — RABEPRAZOLE SODIUM 20 MG/1
20 TABLET, DELAYED RELEASE ORAL DAILY
Qty: 30 TABLET | Refills: 1 | Status: SHIPPED | OUTPATIENT
Start: 2018-07-11 | End: 2018-09-25

## 2018-07-11 RX ORDER — ALBUTEROL SULFATE 90 UG/1
AEROSOL, METERED RESPIRATORY (INHALATION)
Qty: 1 INHALER | Refills: 5 | Status: SHIPPED | OUTPATIENT
Start: 2018-07-11 | End: 2018-08-07

## 2018-07-11 NOTE — TELEPHONE ENCOUNTER
Please review and advise.    Do you want to prescribe something for the heartburn or have her try Zantac OTC per package directions?    Do you want to refill the albuterol inhaler or have her request it from her PCP?    Patient is 33W4D pregnant  Last office visit yesterday 7/10/18    Thank you.    Laila Collins   [facility]  RN

## 2018-07-24 ENCOUNTER — PRENATAL OFFICE VISIT (OUTPATIENT)
Dept: OBGYN | Facility: CLINIC | Age: 33
End: 2018-07-24
Payer: COMMERCIAL

## 2018-07-24 VITALS
HEART RATE: 113 BPM | DIASTOLIC BLOOD PRESSURE: 78 MMHG | BODY MASS INDEX: 25.57 KG/M2 | TEMPERATURE: 98.2 F | SYSTOLIC BLOOD PRESSURE: 118 MMHG | RESPIRATION RATE: 16 BRPM | HEIGHT: 64 IN | WEIGHT: 149.8 LBS

## 2018-07-24 DIAGNOSIS — R10.2 PELVIC CRAMPING: ICD-10-CM

## 2018-07-24 DIAGNOSIS — Z34.80 PRENATAL CARE, SUBSEQUENT PREGNANCY, UNSPECIFIED TRIMESTER: ICD-10-CM

## 2018-07-24 DIAGNOSIS — Z34.83 PRENATAL CARE, SUBSEQUENT PREGNANCY IN THIRD TRIMESTER: Primary | ICD-10-CM

## 2018-07-24 LAB
ALBUMIN UR-MCNC: ABNORMAL MG/DL
APPEARANCE UR: CLEAR
BACTERIA #/AREA URNS HPF: ABNORMAL /HPF
BILIRUB UR QL STRIP: NEGATIVE
COLOR UR AUTO: YELLOW
GLUCOSE UR STRIP-MCNC: NEGATIVE MG/DL
HGB UR QL STRIP: ABNORMAL
KETONES UR STRIP-MCNC: NEGATIVE MG/DL
LEUKOCYTE ESTERASE UR QL STRIP: NEGATIVE
NITRATE UR QL: NEGATIVE
NON-SQ EPI CELLS #/AREA URNS LPF: ABNORMAL /LPF
PH UR STRIP: 7 PH (ref 5–7)
RBC #/AREA URNS AUTO: ABNORMAL /HPF
SOURCE: ABNORMAL
SP GR UR STRIP: 1.02 (ref 1–1.03)
UROBILINOGEN UR STRIP-ACNC: 0.2 EU/DL (ref 0.2–1)
WBC #/AREA URNS AUTO: ABNORMAL /HPF

## 2018-07-24 PROCEDURE — 59025 FETAL NON-STRESS TEST: CPT | Performed by: OBSTETRICS & GYNECOLOGY

## 2018-07-24 PROCEDURE — 81001 URINALYSIS AUTO W/SCOPE: CPT | Performed by: OBSTETRICS & GYNECOLOGY

## 2018-07-24 PROCEDURE — 99207 ZZC PRENATAL VISIT: CPT | Performed by: OBSTETRICS & GYNECOLOGY

## 2018-07-24 PROCEDURE — 87653 STREP B DNA AMP PROBE: CPT | Performed by: OBSTETRICS & GYNECOLOGY

## 2018-07-24 NOTE — PROGRESS NOTES
"  Concerns: Feeling crampy over the last week  Has felt \"hot\"  No other infectious contacts  No bladder symptoms  /78  Pulse 113  Temp 98.2  F (36.8  C)  Resp 16  Ht 5' 4\" (1.626 m)  Wt 149 lb 12.8 oz (67.9 kg)  LMP 11/01/2017  BMI 25.71 kg/m2    Doing well.  No concerns today.  Discussed kick counts and fetal movement.  Discussed PTL, PROM, and when to call or come in.  Checklist updated, see prenatal flowsheet for details  RTC 2 weeks.    Nikita Walton MD      "

## 2018-07-24 NOTE — MR AVS SNAPSHOT
After Visit Summary   7/24/2018    Katerin Jasso    MRN: [id number]           Patient Information     Date Of Birth          1985        Visit Information        Provider Department      7/24/2018 9:45 AM Nikita Walton MD [facility]        Today's Diagnoses     Prenatal care, subsequent pregnancy in third trimester    -  1    Prenatal care, subsequent pregnancy, unspecified trimester        Pelvic cramping           Follow-ups after your visit        Follow-up notes from your care team     Return in about 1 week (around 7/31/2018).      Your next 10 appointments already scheduled     Jul 31, 2018  9:45 AM CDT   ESTABLISHED PRENATAL with Nikita Walton MD   [facility] ([facility])    [address] [address] [phone]            Aug 07, 2018 10:00 AM CDT   ESTABLISHED PRENATAL with Nikita Walton MD   [facility] ([facility])    [address] [facility] [address] [phone]            Aug 14, 2018  9:30 AM CDT   ESTABLISHED PRENATAL with Nikita Walton MD   [facility] ([facility])    [address] [address] [phone]            Aug 17, 2018   Procedure with Nikita Walton MD   [facility] (--)    [address] [address]   [phone]           The medical center is located at [address]. (between [address] in [place], [place] miles north of [place]).              Who to contact     If you have questions or need follow up information about today's clinic visit or your schedule please contact [facility] directly at [phone].  Normal or non-critical lab and imaging results will be communicated to you by MyChart, letter or phone within 4 business days after the clinic has received the results. If you do not hear  "from us within 7 days, please contact the clinic through [employer] or phone. If you have a critical or abnormal lab result, we will notify you by phone as soon as possible.  Submit refill requests through [employer] or call your pharmacy and they will forward the refill request to us. Please allow 3 business days for your refill to be completed.          Additional Information About Your Visit        "[employer] Information     [employer] gives you secure access to your electronic health record. If you see a primary care provider, you can also send messages to your care team and make appointments. If you have questions, please call your primary care clinic.  If you do not have a primary care provider, please call [phone] and they will assist you.        Care EveryWhere ID     This is your Care EveryWhere ID. This could be used by other organizations to access your [place] medical records  [id number]        Your Vitals Were     Pulse Temperature Respirations Height Last Period BMI (Body Mass Index)    113 98.2  F (36.8  C) 16 5' 4\" (1.626 m) 11/01/2017 25.71 kg/m2       Blood Pressure from Last 3 Encounters:   07/24/18 118/78   07/10/18 133/75   06/26/18 117/75    Weight from Last 3 Encounters:   07/24/18 149 lb 12.8 oz (67.9 kg)   07/10/18 148 lb 12.8 oz (67.5 kg)   06/26/18 147 lb 3.2 oz (66.8 kg)              We Performed the Following     *UA reflex to Microscopic     FETAL NON-STRESS TEST     Group B strep PCR     Urine Microscopic        Primary Care Provider Fax #    Physician No Ref-Primary [phone]       No address on file        Equal Access to Services     [facility] : Hadii adriana Ramirez, waaxda luqadaha, qaybta kaalmacheryl greene . So [facility] [phone].    ATENCIÓN: Si habla español, tiene a kee disposición servicios gratuitos de asistencia lingüística. Llame al [phone].    We comply with applicable federal civil rights laws and [place] laws. We " do not discriminate on the basis of race, color, national origin, age, disability, sex, sexual orientation, or gender identity.            Thank you!     Thank you for choosing [facility]  for your care. Our goal is always to provide you with excellent care. Hearing back from our patients is one way we can continue to improve our services. Please take a few minutes to complete the written survey that you may receive in the mail after your visit with us. Thank you!             Your Updated Medication List - Protect others around you: Learn how to safely use, store and throw away your medicines at www.disposemymeds.org.          This list is accurate as of 7/24/18 11:59 PM.  Always use your most recent med list.                   Brand Name Dispense Instructions for use Diagnosis    * albuterol (2.5 MG/3ML) 0.083% neb solution     60 vial    Take 1 vial (2.5 mg) by nebulization every 6 hours as needed for shortness of breath / dyspnea or wheezing    Bronchitis, acute, with bronchospasm       * albuterol 108 (90 Base) MCG/ACT Inhaler    VENTOLIN HFA    1 Inhaler    INHALE 2 PUFFS EVERY 4-6 HOURS AS NEEDED FOR SHORTNESS OF BREATH    Mild intermittent asthma without complication       fluticasone 100 MCG/BLIST Aepb    FLOVENT DISKUS    3 Inhaler    Inhale 1 puff into the lungs 2 times daily    Mild intermittent asthma without complication       prenatal multivitamin plus iron 27-0.8 MG Tabs per tablet      Take 1 tablet by mouth daily    Prenatal care, subsequent pregnancy in first trimester       RABEprazole 20 MG EC tablet    ACIPHEX    30 tablet    Take 1 tablet (20 mg) by mouth daily    Gastroesophageal reflux disease without esophagitis       * Notice:  This list has 2 medication(s) that are the same as other medications prescribed for you. Read the directions carefully, and ask your doctor or other care provider to review them with you.

## 2018-07-25 LAB
GP B STREP DNA SPEC QL NAA+PROBE: NEGATIVE
SPECIMEN SOURCE: NORMAL

## 2018-07-31 ENCOUNTER — PRENATAL OFFICE VISIT (OUTPATIENT)
Dept: OBGYN | Facility: CLINIC | Age: 33
End: 2018-07-31
Payer: COMMERCIAL

## 2018-07-31 VITALS
RESPIRATION RATE: 18 BRPM | TEMPERATURE: 97.8 F | HEIGHT: 64 IN | WEIGHT: 152.4 LBS | BODY MASS INDEX: 26.02 KG/M2 | SYSTOLIC BLOOD PRESSURE: 129 MMHG | HEART RATE: 115 BPM | DIASTOLIC BLOOD PRESSURE: 82 MMHG

## 2018-07-31 DIAGNOSIS — Z34.80 PRENATAL CARE, SUBSEQUENT PREGNANCY, UNSPECIFIED TRIMESTER: Primary | ICD-10-CM

## 2018-07-31 PROCEDURE — 99207 ZZC PRENATAL VISIT: CPT | Performed by: OBSTETRICS & GYNECOLOGY

## 2018-07-31 NOTE — NURSING NOTE
"Chief Complaint   Patient presents with     Prenatal Care       Initial /82 (BP Location: Left arm, Patient Position: Chair, Cuff Size: Adult Regular)  Pulse 115  Temp 97.8  F (36.6  C) (Tympanic)  Resp 18  Ht 5' 4\" (1.626 m)  Wt 152 lb 6.4 oz (69.1 kg)  LMP 11/01/2017  BMI 26.16 kg/m2 Estimated body mass index is 26.16 kg/(m^2) as calculated from the following:    Height as of this encounter: 5' 4\" (1.626 m).    Weight as of this encounter: 152 lb 6.4 oz (69.1 kg).  Medications and allergies reviewed.    Yoko DOCKERY, CMA    "

## 2018-07-31 NOTE — PROGRESS NOTES
Concerns:   .  Doing well.  No concerns today.  No vaginal bleeding, LOF, contractions.  No HA, RUQ pain, N/V, visual changes.  Reportable signs and symptoms discussed.  GBS done today.  Labor precautions discussed.  RTC 1 week.  Prenatal flowsheet information is reviewed.    Nikita Walton MD

## 2018-07-31 NOTE — MR AVS SNAPSHOT
After Visit Summary   7/31/2018    Katerin Jasso    MRN: [id number]           Patient Information     Date Of Birth          1985        Visit Information        Provider Department      7/31/2018 9:45 AM Nikita Walton MD [facility]        Today's Diagnoses     Prenatal care, subsequent pregnancy, unspecified trimester    -  1       Follow-ups after your visit        Follow-up notes from your care team     Return in about 1 week (around 8/7/2018).      Your next 10 appointments already scheduled     Aug 07, 2018 10:00 AM CDT   ESTABLISHED PRENATAL with Nikita Walton MD   [facility] ([facility])    [address] [address]   [phone]            Aug 14, 2018  9:30 AM CDT   ESTABLISHED PRENATAL with Nikita Walton MD   [facility] ([facility])    [address] [facility] [address] [phone]            Aug 17, 2018   Procedure with Nikita Walton MD   [facility] (--)    [address] [facility] [address]   [phone]           The medical center is located at [address]. (between [address] in [place], four miles north of [place]).              Who to contact     If you have questions or need follow up information about today's clinic visit or your schedule please contact [facility] directly at [phone].  Normal or non-critical lab and imaging results will be communicated to you by MyChart, letter or phone within 4 business days after the clinic has received the results. If you do not hear from us within 7 days, please contact the clinic through [employer] or phone. If you have a critical or abnormal lab result, we will notify you by phone as soon as possible.  Submit refill requests through [email or web address] or call your pharmacy and they will forward the refill request to us. Please allow 3  "business days for your refill to be completed.          Additional Information About Your Visit        MyChart Information     [employer] gives you secure access to your electronic health record. If you see a primary care provider, you can also send messages to your care team and make appointments. If you have questions, please call your primary care clinic.  If you do not have a primary care provider, please call [phone] and they will assist you.        Care EveryWhere ID     This is your Care EveryWhere ID. This could be used by other organizations to access your [place] medical records  [id number]        Your Vitals Were     Pulse Temperature Respirations Height Last Period BMI (Body Mass Index)    115 97.8  F (36.6  C) (Tympanic) 18 5' 4\" (1.626 m) 11/01/2017 26.16 kg/m2       Blood Pressure from Last 3 Encounters:   07/31/18 129/82   07/24/18 118/78   07/10/18 133/75    Weight from Last 3 Encounters:   07/31/18 152 lb 6.4 oz (69.1 kg)   07/24/18 149 lb 12.8 oz (67.9 kg)   07/10/18 148 lb 12.8 oz (67.5 kg)              Today, you had the following     No orders found for display       Primary Care Provider Fax #    Physician No Ref-Primary [phone]       No address on file        Equal Access to Services     VON GILLIAM : Hadii adriana ku hadasho Soomaali, waaxda luqadaha, qaybta kaalmada adeegyada, cheryl sarmiento haysalud gupta . So [place] [phone].    ATENCIÓN: Si habla español, tiene a kee disposición servicios gratuitos de asistencia lingüística. Llame al [phone].    We comply with applicable federal civil rights laws and [place] laws. We do not discriminate on the basis of race, color, national origin, age, disability, sex, sexual orientation, or gender identity.            Thank you!     Thank you for choosing [facility]  for your care. Our goal is always to provide you with excellent care. Hearing back from our patients is one way we can continue to improve our " services. Please take a few minutes to complete the written survey that you may receive in the mail after your visit with us. Thank you!             Your Updated Medication List - Protect others around you: Learn how to safely use, store and throw away your medicines at www.disposemymeds.org.          This list is accurate as of 7/31/18 10:09 AM.  Always use your most recent med list.                   Brand Name Dispense Instructions for use Diagnosis    * albuterol (2.5 MG/3ML) 0.083% neb solution     60 vial    Take 1 vial (2.5 mg) by nebulization every 6 hours as needed for shortness of breath / dyspnea or wheezing    Bronchitis, acute, with bronchospasm       * albuterol 108 (90 Base) MCG/ACT Inhaler    VENTOLIN HFA    1 Inhaler    INHALE 2 PUFFS EVERY 4-6 HOURS AS NEEDED FOR SHORTNESS OF BREATH    Mild intermittent asthma without complication       fluticasone 100 MCG/BLIST Aepb    FLOVENT DISKUS    3 Inhaler    Inhale 1 puff into the lungs 2 times daily    Mild intermittent asthma without complication       prenatal multivitamin plus iron 27-0.8 MG Tabs per tablet      Take 1 tablet by mouth daily    Prenatal care, subsequent pregnancy in first trimester       RABEprazole 20 MG EC tablet    ACIPHEX    30 tablet    Take 1 tablet (20 mg) by mouth daily    Gastroesophageal reflux disease without esophagitis       * Notice:  This list has 2 medication(s) that are the same as other medications prescribed for you. Read the directions carefully, and ask your doctor or other care provider to review them with you.

## 2018-08-07 ENCOUNTER — PRENATAL OFFICE VISIT (OUTPATIENT)
Dept: OBGYN | Facility: CLINIC | Age: 33
End: 2018-08-07
Payer: COMMERCIAL

## 2018-08-07 VITALS
BODY MASS INDEX: 25.95 KG/M2 | TEMPERATURE: 98.2 F | WEIGHT: 152 LBS | HEART RATE: 131 BPM | SYSTOLIC BLOOD PRESSURE: 123 MMHG | HEIGHT: 64 IN | RESPIRATION RATE: 16 BRPM | DIASTOLIC BLOOD PRESSURE: 76 MMHG

## 2018-08-07 DIAGNOSIS — J45.20 MILD INTERMITTENT ASTHMA WITHOUT COMPLICATION: ICD-10-CM

## 2018-08-07 DIAGNOSIS — Z34.80 PRENATAL CARE, SUBSEQUENT PREGNANCY, UNSPECIFIED TRIMESTER: Primary | ICD-10-CM

## 2018-08-07 PROCEDURE — 99207 ZZC PRENATAL VISIT: CPT | Performed by: OBSTETRICS & GYNECOLOGY

## 2018-08-07 RX ORDER — ALBUTEROL SULFATE 90 UG/1
AEROSOL, METERED RESPIRATORY (INHALATION)
Qty: 1 INHALER | Refills: 5 | Status: SHIPPED | OUTPATIENT
Start: 2018-08-07 | End: 2019-08-27

## 2018-08-07 NOTE — PROGRESS NOTES
Concerns:   Using her inhalers on a daily basis  She still endorses sterilization  No vaginal bleeding, LOF, contractions.  No HA, RUQ pain, N/V, visual changes.  Reportable signs and symptoms discussed.  Labor precautions discussed.  Prenatal flowsheet information is reviewed.  RTC 1 week.  Pre-op next visit  Repeat CS and PPTL on 8/17/2018  Nikita Walton MD

## 2018-08-07 NOTE — MR AVS SNAPSHOT
After Visit Summary   8/7/2018    Katerin Jasso    MRN: [id number]           Patient Information     Date Of Birth          1985        Visit Information        Provider Department      8/7/2018 10:00 AM Nikita Walton MD [facility]        Today's Diagnoses     Prenatal care, subsequent pregnancy, unspecified trimester    -  1    Mild intermittent asthma without complication           Follow-ups after your visit        Follow-up notes from your care team     Return in about 1 week (around 8/14/2018).      Your next 10 appointments already scheduled     Aug 14, 2018  9:30 AM CDT   ESTABLISHED PRENATAL with Nikita Walton MD   [facility] ([facility]    [address] [address] [phone]            Aug 17, 2018   Procedure with Nikita Walton MD   [facility] (--)    [address] [address] [phone]           The medical center is located at [address]. (between [address] in [place], [place] miles north of [place]).              Who to contact     If you have questions or need follow up information about today's clinic visit or your schedule please contact [facility] directly at [phone].  Normal or non-critical lab and imaging results will be communicated to you by MyChart, letter or phone within 4 business days after the clinic has received the results. If you do not hear from us within 7 days, please contact the clinic through MyChart or phone. If you have a critical or abnormal lab result, we will notify you by phone as soon as possible.  Submit refill requests through [employer] or call your pharmacy and they will forward the refill request to us. Please allow 3 business days for your refill to be completed.          Additional Information About Your Visit        [employer] Information     [employer] gives you secure access to  "your electronic health record. If you see a primary care provider, you can also send messages to your care team and make appointments. If you have questions, please call your primary care clinic.  If you do not have a primary care provider, please call [phone] and they will assist you.        Care EveryWhere ID     This is your Care EveryWhere ID. This could be used by other organizations to access your [facility] medical records  [id number]        Your Vitals Were     Pulse Temperature Respirations Height Last Period BMI (Body Mass Index)    131 98.2  F (36.8  C) 16 5' 4\" (1.626 m) 11/01/2017 26.09 kg/m2       Blood Pressure from Last 3 Encounters:   08/07/18 123/76   07/31/18 129/82   07/24/18 118/78    Weight from Last 3 Encounters:   08/07/18 152 lb (68.9 kg)   07/31/18 152 lb 6.4 oz (69.1 kg)   07/24/18 149 lb 12.8 oz (67.9 kg)              Today, you had the following     No orders found for display         Where to get your medicines      These medications were sent to [facility] [address] [place] [facility] [address] [facility]  [address] [facility] [address]     Phone:  [phone]     albuterol 108 (90 Base) MCG/ACT Inhaler          Primary Care Provider Fax #    Physician No Ref-Primary [phone]       No address on file        Equal Access to Services     VON GILLIAM AH: Hadii adriana navao Somisty, waaxda luqadaha, qaybta kaalmada adeegyada, cheryl mary. So [facility] [phone].    ATENCIÓN: Si habla español, tiene a kee disposición servicios gratuitos de asistencia lingüística. Llame al [phone].    We comply with applicable federal civil rights laws and [place] laws. We do not discriminate on the basis of race, color, national origin, age, disability, sex, sexual orientation, or gender identity.            Thank you!     Thank you for choosing [facility]  for your care. Our goal is always to provide you with excellent care. Hearing back from " our patients is one way we can continue to improve our services. Please take a few minutes to complete the written survey that you may receive in the mail after your visit with us. Thank you!             Your Updated Medication List - Protect others around you: Learn how to safely use, store and throw away your medicines at www.disposemymeds.org.          This list is accurate as of 8/7/18 10:13 AM.  Always use your most recent med list.                   Brand Name Dispense Instructions for use Diagnosis    * albuterol (2.5 MG/3ML) 0.083% neb solution     60 vial    Take 1 vial (2.5 mg) by nebulization every 6 hours as needed for shortness of breath / dyspnea or wheezing    Bronchitis, acute, with bronchospasm       * albuterol 108 (90 Base) MCG/ACT Inhaler    VENTOLIN HFA    1 Inhaler    INHALE 2 PUFFS EVERY 4-6 HOURS AS NEEDED FOR SHORTNESS OF BREATH    Mild intermittent asthma without complication       fluticasone 100 MCG/BLIST Aepb    FLOVENT DISKUS    3 Inhaler    Inhale 1 puff into the lungs 2 times daily    Mild intermittent asthma without complication       prenatal multivitamin plus iron 27-0.8 MG Tabs per tablet      Take 1 tablet by mouth daily    Prenatal care, subsequent pregnancy in first trimester       RABEprazole 20 MG EC tablet    ACIPHEX    30 tablet    Take 1 tablet (20 mg) by mouth daily    Gastroesophageal reflux disease without esophagitis       * Notice:  This list has 2 medication(s) that are the same as other medications prescribed for you. Read the directions carefully, and ask your doctor or other care provider to review them with you.

## 2018-08-14 ENCOUNTER — PRENATAL OFFICE VISIT (OUTPATIENT)
Dept: OBGYN | Facility: CLINIC | Age: 33
End: 2018-08-14
Payer: COMMERCIAL

## 2018-08-14 VITALS
HEART RATE: 123 BPM | HEIGHT: 64 IN | DIASTOLIC BLOOD PRESSURE: 81 MMHG | TEMPERATURE: 98.2 F | RESPIRATION RATE: 18 BRPM | BODY MASS INDEX: 25.95 KG/M2 | WEIGHT: 152 LBS | SYSTOLIC BLOOD PRESSURE: 127 MMHG

## 2018-08-14 DIAGNOSIS — Z34.80 PRENATAL CARE, SUBSEQUENT PREGNANCY, UNSPECIFIED TRIMESTER: ICD-10-CM

## 2018-08-14 DIAGNOSIS — Z01.818 PREOP GENERAL PHYSICAL EXAM: Primary | ICD-10-CM

## 2018-08-14 PROBLEM — Z30.2 ENCOUNTER FOR STERILIZATION: Status: ACTIVE | Noted: 2018-08-14

## 2018-08-14 PROCEDURE — 99207 ZZC PRENATAL VISIT: CPT | Performed by: OBSTETRICS & GYNECOLOGY

## 2018-08-14 RX ORDER — HYDROXYZINE HYDROCHLORIDE 25 MG/1
50 TABLET, FILM COATED ORAL
Qty: 30 TABLET | Refills: 0 | Status: SHIPPED | OUTPATIENT
Start: 2018-08-14 | End: 2018-09-25

## 2018-08-14 NOTE — PROGRESS NOTES
[facility]  [address] [address]  [phone]  Dept: [phone]    PRE-OP EVALUATION:  Today's date: 2018    Katerin Jasso (: 1985) presents for pre-operative evaluation assessment as requested by Dr. Walton.  She requires evaluation and anesthesia risk assessment prior to undergoing surgery/procedure for treatment of csection .    Proposed Surgery/ Procedure: csection  Date of Surgery/ Procedure: 18  Time of Surgery/ Procedure: 730  Hospital/Surgical Facility: [facility]    Primary Physician: No Ref-Primary, Physician  Type of Anesthesia Anticipated: Local    Patient has a Health Care Directive or Living Will:  NO    1. NO - Do you have a history of heart attack, stroke, stent, bypass or surgery on an artery in the head, neck, heart or legs?  2. NO - Do you ever have any pain or discomfort in your chest?  3. NO - Do you have a history of  Heart Failure?  4. Yes due pregnancy - Are you troubled by shortness of breath when: walking on the level, up a slight hill or at night?  5. NO - Do you currently have a cold, bronchitis or other respiratory infection?  6. Yes from allergies and heat - Do you have a cough, shortness of breath or wheezing?  7. NO - Do you sometimes get pains in the calves of your legs when you walk?  8. NO - Do you or anyone in your family have previous history of blood clots?  9. NO - Do you or does anyone in your family have a serious bleeding problem such as prolonged bleeding following surgeries or cuts?  10. Early on in pregnancy - Have you ever had problems with anemia or been told to take iron pills?  11. NO - Have you had any abnormal blood loss such as black, tarry or bloody stools, or abnormal vaginal bleeding?  12. NO - Have you ever had a blood transfusion?  13. NO - Have you or any of your relatives ever had problems with anesthesia?  14. NO - Do you have sleep apnea, excessive snoring or daytime drowsiness?  15. NO - Do  you have any prosthetic heart valves?  16. NO - Do you have prosthetic joints?  17. YES - Is there any chance that you may be pregnant?      HPI:     HPI related to upcoming procedure: repeat CS and PPTL at term      See problem list for active medical problems.  Problems all longstanding and stable, except as noted/documented.  See ROS for pertinent symptoms related to these conditions.                                                                                                                                                          .    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 Colpo and f/u paps NIL     Tobacco use disorder 2015    Quit date 2015 !      Urinary tract bacterial infections     age [age]     Past Surgical History:   Procedure Laterality Date      SECTION N/A 12/10/2015    Procedure:  SECTION;  Surgeon: Norma Sharma MD;  Location: [facility] OR     MOUTH SURGERY      wisdom teeth     Current Outpatient Prescriptions   Medication Sig Dispense Refill     albuterol (VENTOLIN HFA) 108 (90 Base) MCG/ACT Inhaler INHALE 2 PUFFS EVERY 4-6 HOURS AS NEEDED FOR SHORTNESS OF BREATH 1 Inhaler 5     Prenatal Vit-Fe  "Fumarate-FA (PRENATAL MULTIVITAMIN PLUS IRON) 27-0.8 MG TABS per tablet Take 1 tablet by mouth daily       RABEprazole (ACIPHEX) 20 MG EC tablet Take 1 tablet (20 mg) by mouth daily 30 tablet 1     albuterol (2.5 MG/3ML) 0.083% neb solution Take 1 vial (2.5 mg) by nebulization every 6 hours as needed for shortness of breath / dyspnea or wheezing (Patient not taking: Reported on 8/14/2018) 60 vial 1     fluticasone (FLOVENT DISKUS) 100 MCG/BLIST AEPB Inhale 1 puff into the lungs 2 times daily (Patient not taking: Reported on 8/14/2018) 3 Inhaler 3     OTC products: None, except as noted above    Allergies   Allergen Reactions     Cipro [Ciprofloxacin] Hives     Depotest [Testosterone] Hives     No respiratory symptoms      Septra [Bactrim] Hives     Latex Swelling and Rash      Latex Allergy: YES: Precautions to take:     Social History   Substance Use Topics     Smoking status: Former Smoker     Packs/day: 0.50     Types: Cigarettes     Quit date: 1/5/2018     Smokeless tobacco: Never Used      Comment: quit with pregnancy -2018     Alcohol use Yes      Comment: rare- quit with pregnancy     History   Drug Use No       REVIEW OF SYSTEMS:   Constitutional, neuro, ENT, endocrine, pulmonary, cardiac, gastrointestinal, genitourinary, musculoskeletal, integument and psychiatric systems are negative, except as otherwise noted.    EXAM:   /81  Pulse 123  Temp 98.2  F (36.8  C)  Resp 18  Ht 5' 4\" (1.626 m)  Wt 152 lb (68.9 kg)  LMP 11/01/2017  BMI 26.09 kg/m2    GENERAL APPEARANCE: healthy, alert and no distress     EYES: EOMI, PERRL     NECK: no adenopathy, no asymmetry, masses, or scars and thyroid normal to palpation     RESP: lungs clear to auscultation - no rales, rhonchi or wheezes     BREAST: Deferred     CV: regular rates and rhythm, normal S1 S2, no S3 or S4 and no murmur, click or rub     ABDOMEN:  soft, nontender, no HSM or masses and bowel sounds normal     : normal cervix, adnexae, and uterus " without masses or discharge and rectal exam normal without masses-guaiac negative stool     MS: extremities normal- no gross deformities noted, no evidence of inflammation in joints, FROM in all extremities.     SKIN: no suspicious lesions or rashes     NEURO: Normal strength and tone, sensory exam grossly normal, mentation intact and speech normal     PSYCH: mentation appears normal. and affect normal/bright     LYMPHATICS: No cervical adenopathy    DIAGNOSTICS:   No labs or EKG required for low risk surgery (cataract, skin procedure, breast biopsy, etc)    Recent Labs   Lab Test  05/15/18   1018  01/26/18   1116   12/10/15   1420   HGB  10.9*  12.8   < >  11.9   PLT  252  282   < >  214   CR   --    --    --   0.43*    < > = values in this interval not displayed.        IMPRESSION:   Reason for surgery/procedure: Repeat CS and PPTL    The proposed surgical procedure is considered LOW risk.    REVISED CARDIAC RISK INDEX  The patient has the following serious cardiovascular risks for perioperative complications such as (MI, PE, VFib and 3  AV Block):  No serious cardiac risks  INTERPRETATION: 0 risks: Class I (very low risk - 0.4% complication rate)    The patient has the following additional risks for perioperative complications:  No identified additional risks      ICD-10-CM    1. Preop general physical exam Z01.818        RECOMMENDATIONS:         --Patient is to take all scheduled medications on the day of surgery EXCEPT for modifications listed below.    APPROVAL GIVEN to proceed with proposed procedure, without further diagnostic evaluation       Signed Electronically by: Nikita Walton MD    Copy of this evaluation report is provided to requesting physician.    [place] Preop Guidelines    Revised Cardiac Risk Index

## 2018-08-14 NOTE — PROGRESS NOTES
Concerns: Repeat CS and PPTL on Friday  Doing well.  No concerns today.  No vaginal bleeding, LOF, contractions.  No HA, RUQ pain, N/V, visual changes.  Reportable signs and symptoms discussed.  Labor precautions discussed.  Prenatal flowsheet information is reviewed.  RTC 1 week.    Nikita Walton MD

## 2018-08-14 NOTE — MR AVS SNAPSHOT
After Visit Summary   8/14/2018    Katerin Jasso    MRN: [id number]           Patient Information     Date Of Birth          1985        Visit Information        Provider Department      8/14/2018 9:30 AM Nikita Walton MD [facility]        Today's Diagnoses     Preop general physical exam    -  1    Prenatal care, subsequent pregnancy, unspecified trimester          Care Instructions      Before Your Surgery      Call your surgeon if there is any change in your health. This includes signs of a cold or flu (such as a sore throat, runny nose, cough, rash or fever).    Do not smoke, drink alcohol or take over the counter medicine (unless your surgeon or primary care doctor tells you to) for the 24 hours before and after surgery.    If you take prescribed drugs: Follow your doctor s orders about which medicines to take and which to stop until after surgery.    Eating and drinking prior to surgery: follow the instructions from your surgeon    Take a shower or bath the night before surgery. Use the soap your surgeon gave you to gently clean your skin. If you do not have soap from your surgeon, use your regular soap. Do not shave or scrub the surgery site.  Wear clean pajamas and have clean sheets on your bed.           Follow-ups after your visit        Your next 10 appointments already scheduled     Aug 17, 2018   Procedure with Nikita Walton MD   [facility] (--)    [address] [address] [phone]           The medical center is located at [address]. (between [address] in [place], four miles north of [place]).              Who to contact     If you have questions or need follow up information about today's clinic visit or your schedule please contact [facility] directly at [phone].  Normal or non-critical lab and imaging results will be communicated to you by MyChart, letter or  "phone within 4 business days after the clinic has received the results. If you do not hear from us within 7 days, please contact the clinic through [employer] or phone. If you have a critical or abnormal lab result, we will notify you by phone as soon as possible.  Submit refill requests through [employer] or call your pharmacy and they will forward the refill request to us. Please allow 3 business days for your refill to be completed.          Additional Information About Your Visit        [employer] Information     [employer] gives you secure access to your electronic health record. If you see a primary care provider, you can also send messages to your care team and make appointments. If you have questions, please call your primary care clinic.  If you do not have a primary care provider, please call [phone] and they will assist you.        Care EveryWhere ID     This is your Care EveryWhere ID. This could be used by other organizations to access your [facility] medical records  [id number]        Your Vitals Were     Pulse Temperature Respirations Height Last Period BMI (Body Mass Index)    123 98.2  F (36.8  C) 18 5' 4\" (1.626 m) 11/01/2017 26.09 kg/m2       Blood Pressure from Last 3 Encounters:   08/14/18 127/81   08/07/18 123/76   07/31/18 129/82    Weight from Last 3 Encounters:   08/14/18 152 lb (68.9 kg)   08/07/18 152 lb (68.9 kg)   07/31/18 152 lb 6.4 oz (69.1 kg)              Today, you had the following     No orders found for display         Today's Medication Changes          These changes are accurate as of 8/14/18 11:07 AM.  If you have any questions, ask your nurse or doctor.               Start taking these medicines.        Dose/Directions    hydrOXYzine 25 MG tablet   Commonly known as:  ATARAX   Used for:  Prenatal care, subsequent pregnancy, unspecified trimester   Started by:  Nikita Walton MD        Dose:  50 mg   Take 2 tablets (50 mg) by mouth nightly as needed for itching or other " (sleep)   Quantity:  30 tablet   Refills:  0            Where to get your medicines      These medications were sent to [facility] [address] [place] [facility] - [place], [place] - [address]  [address] [facility] [address]     Phone:  [phone]     hydrOXYzine 25 MG tablet                Primary Care Provider Fax #    Physician No Ref-Primary [phone]       No address on file        Equal Access to Services     VON GILLIAM : Hadii aad ku hadasho Soomaali, waaxda luqadaha, qaybta kaalmada adeegyada, waxay idiin hayaan ademila khulyssessh lashelby mary. So [facility] [phone].    ATENCIÓN: Si habla español, tiene a kee disposición servicios gratuitos de asistencia lingüística. Llame al [phone].    We comply with applicable federal civil rights laws and [place] laws. We do not discriminate on the basis of race, color, national origin, age, disability, sex, sexual orientation, or gender identity.            Thank you!     Thank you for choosing [facility]  for your care. Our goal is always to provide you with excellent care. Hearing back from our patients is one way we can continue to improve our services. Please take a few minutes to complete the written survey that you may receive in the mail after your visit with us. Thank you!             Your Updated Medication List - Protect others around you: Learn how to safely use, store and throw away your medicines at www.disposemymeds.org.          This list is accurate as of 8/14/18 11:07 AM.  Always use your most recent med list.                   Brand Name Dispense Instructions for use Diagnosis    * albuterol (2.5 MG/3ML) 0.083% neb solution     60 vial    Take 1 vial (2.5 mg) by nebulization every 6 hours as needed for shortness of breath / dyspnea or wheezing    Bronchitis, acute, with bronchospasm       * albuterol 108 (90 Base) MCG/ACT inhaler    VENTOLIN HFA    1 Inhaler    INHALE 2 PUFFS EVERY 4-6 HOURS AS NEEDED FOR SHORTNESS OF BREATH    Mild  intermittent asthma without complication       fluticasone 100 MCG/BLIST Aepb    FLOVENT DISKUS    3 Inhaler    Inhale 1 puff into the lungs 2 times daily    Mild intermittent asthma without complication       hydrOXYzine 25 MG tablet    ATARAX    30 tablet    Take 2 tablets (50 mg) by mouth nightly as needed for itching or other (sleep)    Prenatal care, subsequent pregnancy, unspecified trimester       prenatal multivitamin plus iron 27-0.8 MG Tabs per tablet      Take 1 tablet by mouth daily    Prenatal care, subsequent pregnancy in first trimester       RABEprazole 20 MG EC tablet    ACIPHEX    30 tablet    Take 1 tablet (20 mg) by mouth daily    Gastroesophageal reflux disease without esophagitis       * Notice:  This list has 2 medication(s) that are the same as other medications prescribed for you. Read the directions carefully, and ask your doctor or other care provider to review them with you.

## 2018-08-16 ENCOUNTER — ANESTHESIA EVENT (OUTPATIENT)
Dept: SURGERY | Facility: CLINIC | Age: 33
End: 2018-08-16
Payer: COMMERCIAL

## 2018-08-17 ENCOUNTER — SURGERY (OUTPATIENT)
Age: 33
End: 2018-08-17

## 2018-08-17 ENCOUNTER — ANESTHESIA (OUTPATIENT)
Dept: SURGERY | Facility: CLINIC | Age: 33
End: 2018-08-17
Payer: COMMERCIAL

## 2018-08-17 ENCOUNTER — HOSPITAL ENCOUNTER (INPATIENT)
Facility: CLINIC | Age: 33
LOS: 2 days | Discharge: HOME | End: 2018-08-19
Attending: OBSTETRICS & GYNECOLOGY | Admitting: OBSTETRICS & GYNECOLOGY
Payer: COMMERCIAL

## 2018-08-17 DIAGNOSIS — Z98.891 S/P CESAREAN SECTION: Primary | ICD-10-CM

## 2018-08-17 PROBLEM — Z34.90 PREGNANCY: Status: ACTIVE | Noted: 2018-08-17

## 2018-08-17 LAB
ABO + RH BLD: NORMAL
ABO + RH BLD: NORMAL
BASOPHILS # BLD AUTO: 0 10E9/L (ref 0–0.2)
BASOPHILS NFR BLD AUTO: 0.2 %
BLD GP AB SCN SERPL QL: NORMAL
BLOOD BANK CMNT PATIENT-IMP: NORMAL
DIFFERENTIAL METHOD BLD: ABNORMAL
EOSINOPHIL # BLD AUTO: 0.2 10E9/L (ref 0–0.7)
EOSINOPHIL NFR BLD AUTO: 1.2 %
ERYTHROCYTE [DISTWIDTH] IN BLOOD BY AUTOMATED COUNT: 12.5 % (ref 10–15)
HCT VFR BLD AUTO: 33.3 % (ref 35–47)
HGB BLD-MCNC: 11.2 G/DL (ref 11.7–15.7)
IMM GRANULOCYTES # BLD: 0.1 10E9/L (ref 0–0.4)
IMM GRANULOCYTES NFR BLD: 0.8 %
LYMPHOCYTES # BLD AUTO: 3.6 10E9/L (ref 0.8–5.3)
LYMPHOCYTES NFR BLD AUTO: 24.6 %
MCH RBC QN AUTO: 32.7 PG (ref 26.5–33)
MCHC RBC AUTO-ENTMCNC: 33.6 G/DL (ref 31.5–36.5)
MCV RBC AUTO: 97 FL (ref 78–100)
MONOCYTES # BLD AUTO: 0.8 10E9/L (ref 0–1.3)
MONOCYTES NFR BLD AUTO: 5.3 %
NEUTROPHILS # BLD AUTO: 9.8 10E9/L (ref 1.6–8.3)
NEUTROPHILS NFR BLD AUTO: 67.9 %
NRBC # BLD AUTO: 0 10*3/UL
NRBC BLD AUTO-RTO: 0 /100
PLATELET # BLD AUTO: 238 10E9/L (ref 150–450)
RBC # BLD AUTO: 3.42 10E12/L (ref 3.8–5.2)
SPECIMEN EXP DATE BLD: NORMAL
T PALLIDUM AB SER QL: NONREACTIVE
WBC # BLD AUTO: 14.4 10E9/L (ref 4–11)

## 2018-08-17 PROCEDURE — 37000009 ZZH ANESTHESIA TECHNICAL FEE, EACH ADDTL 15 MIN: Performed by: OBSTETRICS & GYNECOLOGY

## 2018-08-17 PROCEDURE — 88302 TISSUE EXAM BY PATHOLOGIST: CPT | Mod: 26 | Performed by: OBSTETRICS & GYNECOLOGY

## 2018-08-17 PROCEDURE — 12000027 ZZH R&B OB

## 2018-08-17 PROCEDURE — 25000128 H RX IP 250 OP 636: Performed by: NURSE ANESTHETIST, CERTIFIED REGISTERED

## 2018-08-17 PROCEDURE — 25000128 H RX IP 250 OP 636: Performed by: OBSTETRICS & GYNECOLOGY

## 2018-08-17 PROCEDURE — 37000008 ZZH ANESTHESIA TECHNICAL FEE, 1ST 30 MIN: Performed by: OBSTETRICS & GYNECOLOGY

## 2018-08-17 PROCEDURE — 27110028 ZZH OR GENERAL SUPPLY NON-STERILE: Performed by: OBSTETRICS & GYNECOLOGY

## 2018-08-17 PROCEDURE — 36000056 ZZH SURGERY LEVEL 3 1ST 30 MIN: Performed by: OBSTETRICS & GYNECOLOGY

## 2018-08-17 PROCEDURE — 0UB70ZZ EXCISION OF BILATERAL FALLOPIAN TUBES, OPEN APPROACH: ICD-10-PCS | Performed by: OBSTETRICS & GYNECOLOGY

## 2018-08-17 PROCEDURE — 88302 TISSUE EXAM BY PATHOLOGIST: CPT | Performed by: OBSTETRICS & GYNECOLOGY

## 2018-08-17 PROCEDURE — 86901 BLOOD TYPING SEROLOGIC RH(D): CPT | Performed by: OBSTETRICS & GYNECOLOGY

## 2018-08-17 PROCEDURE — 40000274 ZZH STATISTIC RCP CONSULT EA 30 MIN

## 2018-08-17 PROCEDURE — 85025 COMPLETE CBC W/AUTO DIFF WBC: CPT | Performed by: OBSTETRICS & GYNECOLOGY

## 2018-08-17 PROCEDURE — 25000132 ZZH RX MED GY IP 250 OP 250 PS 637: Performed by: OBSTETRICS & GYNECOLOGY

## 2018-08-17 PROCEDURE — 86850 RBC ANTIBODY SCREEN: CPT | Performed by: OBSTETRICS & GYNECOLOGY

## 2018-08-17 PROCEDURE — 25000125 ZZHC RX 250: Performed by: NURSE ANESTHETIST, CERTIFIED REGISTERED

## 2018-08-17 PROCEDURE — 71000012 ZZH RECOVERY PHASE 1 LEVEL 1 FIRST HR: Performed by: OBSTETRICS & GYNECOLOGY

## 2018-08-17 PROCEDURE — 86900 BLOOD TYPING SEROLOGIC ABO: CPT | Performed by: OBSTETRICS & GYNECOLOGY

## 2018-08-17 PROCEDURE — 86780 TREPONEMA PALLIDUM: CPT | Performed by: OBSTETRICS & GYNECOLOGY

## 2018-08-17 PROCEDURE — 71000013 ZZH RECOVERY PHASE 1 LEVEL 1 EA ADDTL HR: Performed by: OBSTETRICS & GYNECOLOGY

## 2018-08-17 PROCEDURE — 27210995 ZZH RX 272: Performed by: OBSTETRICS & GYNECOLOGY

## 2018-08-17 PROCEDURE — 36000058 ZZH SURGERY LEVEL 3 EA 15 ADDTL MIN: Performed by: OBSTETRICS & GYNECOLOGY

## 2018-08-17 PROCEDURE — 27210794 ZZH OR GENERAL SUPPLY STERILE: Performed by: OBSTETRICS & GYNECOLOGY

## 2018-08-17 RX ORDER — ACETAMINOPHEN 325 MG/1
650 TABLET ORAL EVERY 4 HOURS PRN
Status: DISCONTINUED | OUTPATIENT
Start: 2018-08-20 | End: 2018-08-19 | Stop reason: HOSPADM

## 2018-08-17 RX ORDER — AMOXICILLIN 250 MG
2 CAPSULE ORAL 2 TIMES DAILY PRN
Status: DISCONTINUED | OUTPATIENT
Start: 2018-08-17 | End: 2018-08-19 | Stop reason: HOSPADM

## 2018-08-17 RX ORDER — CEFAZOLIN SODIUM 2 G/100ML
2 INJECTION, SOLUTION INTRAVENOUS
Status: COMPLETED | OUTPATIENT
Start: 2018-08-17 | End: 2018-08-17

## 2018-08-17 RX ORDER — BISACODYL 10 MG
10 SUPPOSITORY, RECTAL RECTAL DAILY PRN
Status: DISCONTINUED | OUTPATIENT
Start: 2018-08-19 | End: 2018-08-19 | Stop reason: HOSPADM

## 2018-08-17 RX ORDER — KETOROLAC TROMETHAMINE 30 MG/ML
30 INJECTION, SOLUTION INTRAMUSCULAR; INTRAVENOUS EVERY 6 HOURS
Status: DISPENSED | OUTPATIENT
Start: 2018-08-17 | End: 2018-08-18

## 2018-08-17 RX ORDER — LANOLIN 100 %
OINTMENT (GRAM) TOPICAL
Status: DISCONTINUED | OUTPATIENT
Start: 2018-08-17 | End: 2018-08-19 | Stop reason: HOSPADM

## 2018-08-17 RX ORDER — SIMETHICONE 80 MG
80 TABLET,CHEWABLE ORAL 4 TIMES DAILY PRN
Status: DISCONTINUED | OUTPATIENT
Start: 2018-08-17 | End: 2018-08-19 | Stop reason: HOSPADM

## 2018-08-17 RX ORDER — ONDANSETRON 2 MG/ML
INJECTION INTRAMUSCULAR; INTRAVENOUS PRN
Status: DISCONTINUED | OUTPATIENT
Start: 2018-08-17 | End: 2018-08-17

## 2018-08-17 RX ORDER — HYDROCORTISONE 2.5 %
CREAM (GRAM) TOPICAL 3 TIMES DAILY PRN
Status: DISCONTINUED | OUTPATIENT
Start: 2018-08-17 | End: 2018-08-19 | Stop reason: HOSPADM

## 2018-08-17 RX ORDER — CITRIC ACID/SODIUM CITRATE 334-500MG
30 SOLUTION, ORAL ORAL
Status: COMPLETED | OUTPATIENT
Start: 2018-08-17 | End: 2018-08-17

## 2018-08-17 RX ORDER — ALBUTEROL SULFATE 0.83 MG/ML
2.5 SOLUTION RESPIRATORY (INHALATION) EVERY 6 HOURS PRN
Status: DISCONTINUED | OUTPATIENT
Start: 2018-08-17 | End: 2018-08-19 | Stop reason: HOSPADM

## 2018-08-17 RX ORDER — OXYTOCIN/0.9 % SODIUM CHLORIDE 30/500 ML
PLASTIC BAG, INJECTION (ML) INTRAVENOUS PRN
Status: DISCONTINUED | OUTPATIENT
Start: 2018-08-17 | End: 2018-08-17

## 2018-08-17 RX ORDER — CEFAZOLIN SODIUM 1 G/50ML
1 INJECTION, SOLUTION INTRAVENOUS SEE ADMIN INSTRUCTIONS
Status: DISCONTINUED | OUTPATIENT
Start: 2018-08-17 | End: 2018-08-17

## 2018-08-17 RX ORDER — LIDOCAINE 40 MG/G
CREAM TOPICAL
Status: DISCONTINUED | OUTPATIENT
Start: 2018-08-17 | End: 2018-08-19 | Stop reason: HOSPADM

## 2018-08-17 RX ORDER — ALBUTEROL SULFATE 90 UG/1
1-2 AEROSOL, METERED RESPIRATORY (INHALATION)
Status: DISCONTINUED | OUTPATIENT
Start: 2018-08-17 | End: 2018-08-19 | Stop reason: HOSPADM

## 2018-08-17 RX ORDER — MAGNESIUM HYDROXIDE 1200 MG/15ML
LIQUID ORAL PRN
Status: DISCONTINUED | OUTPATIENT
Start: 2018-08-17 | End: 2018-08-17

## 2018-08-17 RX ORDER — ONDANSETRON 2 MG/ML
4 INJECTION INTRAMUSCULAR; INTRAVENOUS EVERY 6 HOURS PRN
Status: DISCONTINUED | OUTPATIENT
Start: 2018-08-17 | End: 2018-08-19 | Stop reason: HOSPADM

## 2018-08-17 RX ORDER — KETOROLAC TROMETHAMINE 30 MG/ML
INJECTION, SOLUTION INTRAMUSCULAR; INTRAVENOUS PRN
Status: DISCONTINUED | OUTPATIENT
Start: 2018-08-17 | End: 2018-08-17

## 2018-08-17 RX ORDER — OXYTOCIN/0.9 % SODIUM CHLORIDE 30/500 ML
100 PLASTIC BAG, INJECTION (ML) INTRAVENOUS CONTINUOUS
Status: DISCONTINUED | OUTPATIENT
Start: 2018-08-17 | End: 2018-08-19 | Stop reason: HOSPADM

## 2018-08-17 RX ORDER — OXYTOCIN 10 [USP'U]/ML
10 INJECTION, SOLUTION INTRAMUSCULAR; INTRAVENOUS
Status: DISCONTINUED | OUTPATIENT
Start: 2018-08-17 | End: 2018-08-19 | Stop reason: HOSPADM

## 2018-08-17 RX ORDER — IBUPROFEN 600 MG/1
600 TABLET, FILM COATED ORAL EVERY 6 HOURS PRN
Status: DISCONTINUED | OUTPATIENT
Start: 2018-08-17 | End: 2018-08-19 | Stop reason: HOSPADM

## 2018-08-17 RX ORDER — EPINEPHRINE 1 MG/ML
INJECTION, SOLUTION, CONCENTRATE INTRAVENOUS PRN
Status: DISCONTINUED | OUTPATIENT
Start: 2018-08-17 | End: 2018-08-17

## 2018-08-17 RX ORDER — OXYTOCIN/0.9 % SODIUM CHLORIDE 30/500 ML
340 PLASTIC BAG, INJECTION (ML) INTRAVENOUS CONTINUOUS PRN
Status: DISCONTINUED | OUTPATIENT
Start: 2018-08-17 | End: 2018-08-19 | Stop reason: HOSPADM

## 2018-08-17 RX ORDER — MORPHINE SULFATE 1 MG/ML
INJECTION, SOLUTION EPIDURAL; INTRATHECAL; INTRAVENOUS PRN
Status: DISCONTINUED | OUTPATIENT
Start: 2018-08-17 | End: 2018-08-17

## 2018-08-17 RX ORDER — FENTANYL CITRATE 50 UG/ML
INJECTION, SOLUTION INTRAMUSCULAR; INTRAVENOUS PRN
Status: DISCONTINUED | OUTPATIENT
Start: 2018-08-17 | End: 2018-08-17

## 2018-08-17 RX ORDER — AMOXICILLIN 250 MG
1 CAPSULE ORAL 2 TIMES DAILY PRN
Status: DISCONTINUED | OUTPATIENT
Start: 2018-08-17 | End: 2018-08-19 | Stop reason: HOSPADM

## 2018-08-17 RX ORDER — DIPHENHYDRAMINE HCL 25 MG
25 CAPSULE ORAL EVERY 6 HOURS PRN
Status: DISCONTINUED | OUTPATIENT
Start: 2018-08-17 | End: 2018-08-19 | Stop reason: HOSPADM

## 2018-08-17 RX ORDER — MISOPROSTOL 200 UG/1
400 TABLET ORAL
Status: DISCONTINUED | OUTPATIENT
Start: 2018-08-17 | End: 2018-08-19 | Stop reason: HOSPADM

## 2018-08-17 RX ORDER — DEXTROSE, SODIUM CHLORIDE, SODIUM LACTATE, POTASSIUM CHLORIDE, AND CALCIUM CHLORIDE 5; .6; .31; .03; .02 G/100ML; G/100ML; G/100ML; G/100ML; G/100ML
INJECTION, SOLUTION INTRAVENOUS CONTINUOUS
Status: DISCONTINUED | OUTPATIENT
Start: 2018-08-17 | End: 2018-08-19 | Stop reason: HOSPADM

## 2018-08-17 RX ORDER — DIPHENHYDRAMINE HYDROCHLORIDE 50 MG/ML
25 INJECTION INTRAMUSCULAR; INTRAVENOUS EVERY 6 HOURS PRN
Status: DISCONTINUED | OUTPATIENT
Start: 2018-08-17 | End: 2018-08-19 | Stop reason: HOSPADM

## 2018-08-17 RX ORDER — BUPIVACAINE HYDROCHLORIDE 7.5 MG/ML
INJECTION, SOLUTION INTRASPINAL PRN
Status: DISCONTINUED | OUTPATIENT
Start: 2018-08-17 | End: 2018-08-17

## 2018-08-17 RX ORDER — SODIUM CHLORIDE, SODIUM LACTATE, POTASSIUM CHLORIDE, CALCIUM CHLORIDE 600; 310; 30; 20 MG/100ML; MG/100ML; MG/100ML; MG/100ML
INJECTION, SOLUTION INTRAVENOUS CONTINUOUS
Status: DISCONTINUED | OUTPATIENT
Start: 2018-08-17 | End: 2018-08-17

## 2018-08-17 RX ORDER — IBUPROFEN 800 MG/1
800 TABLET, FILM COATED ORAL EVERY 6 HOURS PRN
Status: DISCONTINUED | OUTPATIENT
Start: 2018-08-17 | End: 2018-08-19 | Stop reason: HOSPADM

## 2018-08-17 RX ORDER — IBUPROFEN 400 MG/1
400 TABLET, FILM COATED ORAL EVERY 6 HOURS PRN
Status: DISCONTINUED | OUTPATIENT
Start: 2018-08-17 | End: 2018-08-19 | Stop reason: HOSPADM

## 2018-08-17 RX ORDER — ACETAMINOPHEN 325 MG/1
975 TABLET ORAL EVERY 8 HOURS
Status: DISCONTINUED | OUTPATIENT
Start: 2018-08-17 | End: 2018-08-19 | Stop reason: HOSPADM

## 2018-08-17 RX ORDER — NALOXONE HYDROCHLORIDE 0.4 MG/ML
.1-.4 INJECTION, SOLUTION INTRAMUSCULAR; INTRAVENOUS; SUBCUTANEOUS
Status: DISCONTINUED | OUTPATIENT
Start: 2018-08-17 | End: 2018-08-19 | Stop reason: HOSPADM

## 2018-08-17 RX ORDER — OXYCODONE HYDROCHLORIDE 5 MG/1
5-10 TABLET ORAL
Status: DISCONTINUED | OUTPATIENT
Start: 2018-08-17 | End: 2018-08-19 | Stop reason: HOSPADM

## 2018-08-17 RX ADMIN — SODIUM CITRATE AND CITRIC ACID MONOHYDRATE 30 ML: 500; 334 SOLUTION ORAL at 06:47

## 2018-08-17 RX ADMIN — CEFAZOLIN SODIUM 2 G: 2 INJECTION, SOLUTION INTRAVENOUS at 07:22

## 2018-08-17 RX ADMIN — KETOROLAC TROMETHAMINE 30 MG: 30 INJECTION, SOLUTION INTRAMUSCULAR at 20:21

## 2018-08-17 RX ADMIN — ACETAMINOPHEN 975 MG: 325 TABLET, FILM COATED ORAL at 09:18

## 2018-08-17 RX ADMIN — OXYTOCIN-SODIUM CHLORIDE 0.9% IV SOLN 30 UNIT/500ML 500 ML: 30-0.9/5 SOLUTION at 08:35

## 2018-08-17 RX ADMIN — OXYCODONE HYDROCHLORIDE 5 MG: 5 TABLET ORAL at 15:24

## 2018-08-17 RX ADMIN — OXYCODONE HYDROCHLORIDE 5 MG: 5 TABLET ORAL at 12:15

## 2018-08-17 RX ADMIN — SODIUM CHLORIDE 800 ML: 900 IRRIGANT IRRIGATION at 08:33

## 2018-08-17 RX ADMIN — MORPHINE SULFATE 0.2 MG: 1 INJECTION, SOLUTION EPIDURAL; INTRATHECAL; INTRAVENOUS at 07:42

## 2018-08-17 RX ADMIN — OXYTOCIN 100 ML/HR: 10 INJECTION INTRAVENOUS at 12:30

## 2018-08-17 RX ADMIN — KETOROLAC TROMETHAMINE 30 MG: 30 INJECTION, SOLUTION INTRAMUSCULAR at 14:20

## 2018-08-17 RX ADMIN — FENTANYL CITRATE 50 MCG: 50 INJECTION, SOLUTION INTRAMUSCULAR; INTRAVENOUS at 08:16

## 2018-08-17 RX ADMIN — SODIUM CHLORIDE, POTASSIUM CHLORIDE, SODIUM LACTATE AND CALCIUM CHLORIDE 1000 ML: 600; 310; 30; 20 INJECTION, SOLUTION INTRAVENOUS at 06:26

## 2018-08-17 RX ADMIN — EPINEPHRINE 0.2 MG: 1 INJECTION, SOLUTION INTRAMUSCULAR; SUBCUTANEOUS at 07:42

## 2018-08-17 RX ADMIN — ACETAMINOPHEN 975 MG: 325 TABLET, FILM COATED ORAL at 17:38

## 2018-08-17 RX ADMIN — OXYCODONE HYDROCHLORIDE 5 MG: 5 TABLET ORAL at 20:51

## 2018-08-17 RX ADMIN — OXYCODONE HYDROCHLORIDE 5 MG: 5 TABLET ORAL at 09:18

## 2018-08-17 RX ADMIN — SODIUM CHLORIDE, POTASSIUM CHLORIDE, SODIUM LACTATE AND CALCIUM CHLORIDE: 600; 310; 30; 20 INJECTION, SOLUTION INTRAVENOUS at 07:22

## 2018-08-17 RX ADMIN — OXYTOCIN-SODIUM CHLORIDE 0.9% IV SOLN 30 UNIT/500ML 500 ML: 30-0.9/5 SOLUTION at 08:00

## 2018-08-17 RX ADMIN — BUPIVACAINE HYDROCHLORIDE IN DEXTROSE 12 MG: 7.5 INJECTION, SOLUTION SUBARACHNOID at 07:42

## 2018-08-17 RX ADMIN — FENTANYL CITRATE 50 MCG: 50 INJECTION, SOLUTION INTRAMUSCULAR; INTRAVENOUS at 08:14

## 2018-08-17 RX ADMIN — KETOROLAC TROMETHAMINE 30 MG: 30 INJECTION, SOLUTION INTRAMUSCULAR at 08:15

## 2018-08-17 RX ADMIN — OXYCODONE HYDROCHLORIDE 5 MG: 5 TABLET ORAL at 18:08

## 2018-08-17 RX ADMIN — SENNOSIDES AND DOCUSATE SODIUM 2 TABLET: 8.6; 5 TABLET ORAL at 20:18

## 2018-08-17 RX ADMIN — ONDANSETRON 4 MG: 2 INJECTION INTRAMUSCULAR; INTRAVENOUS at 07:46

## 2018-08-17 RX ADMIN — SIMETHICONE CHEW TAB 80 MG 80 MG: 80 TABLET ORAL at 20:19

## 2018-08-17 ASSESSMENT — LIFESTYLE VARIABLES: TOBACCO_USE: 1

## 2018-08-17 NOTE — IP AVS SNAPSHOT
MRN:[id number]                      After Visit Summary   2018    Katerin Jasso    MRN: [id number]           Thank you!     Thank you for choosing [facility] for your care. Our goal is always to provide you with excellent care. Hearing back from our patients is one way we can continue to improve our services. Please take a few minutes to complete the written survey that you may receive in the mail after you visit with us. Thank you!        Patient Information     Date Of Birth          1985        Designated Caregiver       Most Recent Value    Caregiver    Will someone help with your care after discharge? no      About your hospital stay     You were admitted on:  2018 You last received care in the:  [facility]    You were discharged on:  2018       Who to Call     For medical emergencies, please call 911.  For non-urgent questions about your medical care, please call your primary care provider or clinic, None  For questions related to your surgery, please call your surgery clinic        Attending Provider     Provider Nikita Waddell MD OB/Gyn       Primary Care Provider Fax #    Physician No Ref-Primary [phone]      Further instructions from your care team       Postop  Birth Instructions    Activity       Do not lift more than 10 pounds for 6 weeks after surgery.  Ask family and friends for help when you need it.    No driving until you have stopped taking your pain medications (usually two weeks after surgery).    No heavy exercise or activity for 6 weeks.  Don't do anything that will put a strain on your surgery site.    Don't strain when using the toilet.  Your care team may prescribe a stool softener if you have problems with your bowel movements.     To care for your incision:       Keep the incision clean and dry.    Do not soak your incision in water. No swimming or hot tubs until it has fully healed. You  may soak in the bathtub if the water level is below your incision.    Do not use peroxide, gel, cream, lotion, or ointment on your incision.    Adjust your clothes to avoid pressure on your surgery site (check the elastic in your underwear for example).     You may see a small amount of clear or pink drainage and this is normal.  Check with your health care provider:       If the drainage increases or has an odor.    If the incision reddens, you have swelling, or develop a rash.    If you have increased pain and the medicine we prescribed doesn't help.    If you have a fever above 100.4 F (38 C) with or without chills when placing thermometer under your tongue.   The area around your incision (surgery wound), will feel numb.  This is normal. The numbness should go away in less than a year.     Keep your hands clean:  Always wash your hands before touching your incision (surgery wound). This helps reduce your risk of infection. If your hands aren't dirty, you may use an alcohol hand-rub to clean your hands. Keep your nails clean and short.    Call your healthcare provider if you have any of these symptoms:       You soak a sanitary pad with blood within 1 hour, or you see blood clots larger than a golf ball.    Bleeding that lasts more than 6 weeks.    Vaginal discharge that smells bad.    Severe pain, cramping or tenderness in your lower belly area.    A need to urinate more frequently (use the toilet more often), more urgently (use the toilet very quickly), or it burns when you urinate.    Nausea and vomiting.    Redness, swelling or pain around a vein in your leg.    Problems breastfeeding or a red or painful area on your breast.    Chest pain and cough or are gasping for air.    Problems with coping with sadness, anxiety or depression. If you have concerns about hurting yourself or the baby, call your provider immediately.      You have questions or concerns after you return home.                  Pending Results  "    Date and Time Order Name Status Description    8/17/2018 0813 Surgical pathology exam In process             Statement of Approval     Ordered          08/19/18 0746  I have reviewed and agree with all the recommendations and orders detailed in this document.  EFFECTIVE NOW     Approved and electronically signed by:  Nikita Walton MD             Admission Information     Date & Time Provider Department Dept. Phone    8/17/2018 Nikita Walton MD [facility] [phone]      Your Vitals Were     Blood Pressure Pulse Temperature Respirations Height Weight    122/73 65 98.3  F (36.8  C) (Oral) 16 1.638 m (5' 4.5\") 68 kg (150 lb)    Last Period Pulse Oximetry BMI (Body Mass Index)             11/01/2017 97% 25.35 kg/m2         MyChart Information     [facility] gives you secure access to your electronic health record. If you see a primary care provider, you can also send messages to your care team and make appointments. If you have questions, please call your primary care clinic.  If you do not have a primary care provider, please call [phone] and they will assist you.        Care EveryWhere ID     This is your Care EveryWhere ID. This could be used by other organizations to access your [facility] medical records  [id number]        Equal Access to Services     VON GILLIAM : Sabrina navao Somisty, waaxda luqadaha, qaybta kaalmada ademilayada, cheryl mary. So [facility] [phone].    ATENCIÓN: Si habla español, tiene a kee disposición servicios gratuitos de asistencia lingüística. Llame al [phone].    We comply with applicable federal civil rights laws and [place] laws. We do not discriminate on the basis of race, color, national origin, age, disability, sex, sexual orientation, or gender identity.               Review of your medicines      START taking        Dose / Directions    ibuprofen 200 MG tablet   Commonly known as:  ADVIL/MOTRIN        " Dose:  600 mg   Take 3 tablets (600 mg) by mouth every 6 hours as needed for other (carmping)   Refills:  0       oxyCODONE IR 5 MG tablet   Commonly known as:  ROXICODONE        Dose:  5-10 mg   Take 1-2 tablets (5-10 mg) by mouth every 3 hours as needed for pain   Quantity:  10 tablet   Refills:  0       senna-docusate 8.6-50 MG per tablet   Commonly known as:  SENOKOT-S;PERICOLACE        Dose:  1 tablet   Take 1 tablet by mouth 2 times daily as needed for constipation   Quantity:  60 tablet   Refills:  0         CONTINUE these medicines which have NOT CHANGED        Dose / Directions    * albuterol (2.5 MG/3ML) 0.083% neb solution   Used for:  Bronchitis, acute, with bronchospasm        Dose:  1 vial   Take 1 vial (2.5 mg) by nebulization every 6 hours as needed for shortness of breath / dyspnea or wheezing   Quantity:  60 vial   Refills:  1       * albuterol 108 (90 Base) MCG/ACT inhaler   Commonly known as:  VENTOLIN HFA   Used for:  Mild intermittent asthma without complication        INHALE 2 PUFFS EVERY 4-6 HOURS AS NEEDED FOR SHORTNESS OF BREATH   Quantity:  1 Inhaler   Refills:  5       fluticasone 100 MCG/BLIST Aepb   Commonly known as:  FLOVENT DISKUS   Used for:  Mild intermittent asthma without complication        Dose:  1 puff   Inhale 1 puff into the lungs 2 times daily   Quantity:  3 Inhaler   Refills:  3       hydrOXYzine 25 MG tablet   Commonly known as:  ATARAX   Used for:  Prenatal care, subsequent pregnancy, unspecified trimester        Dose:  50 mg   Take 2 tablets (50 mg) by mouth nightly as needed for itching or other (sleep)   Quantity:  30 tablet   Refills:  0       prenatal multivitamin plus iron 27-0.8 MG Tabs per tablet   Used for:  Prenatal care, subsequent pregnancy in first trimester        Dose:  1 tablet   Take 1 tablet by mouth daily   Refills:  0       RABEprazole 20 MG EC tablet   Commonly known as:  ACIPHEX   Used for:  Gastroesophageal reflux disease without esophagitis         Dose:  20 mg   Take 1 tablet (20 mg) by mouth daily   Quantity:  30 tablet   Refills:  1       * Notice:  This list has 2 medication(s) that are the same as other medications prescribed for you. Read the directions carefully, and ask your doctor or other care provider to review them with you.         Where to get your medicines      These medications were sent to [facility], [place] - [address]  [address], [place] [address]     Phone:  [phone]     senna-docusate 8.6-50 MG per tablet         Some of these will need a paper prescription and others can be bought over the counter. Ask your nurse if you have questions.     Bring a paper prescription for each of these medications     oxyCODONE IR 5 MG tablet       You don't need a prescription for these medications     ibuprofen 200 MG tablet                Protect others around you: Learn how to safely use, store and throw away your medicines at www.disposemymeds.org.        Information about OPIOIDS     PRESCRIPTION OPIOIDS: WHAT YOU NEED TO KNOW   We gave you an opioid (narcotic) pain medicine. It is important to manage your pain, but opioids are not always the best choice. You should first try all the other options your care team gave you. Take this medicine for as short a time (and as few doses) as possible.    Some activities can increase your pain, such as bandage changes or therapy sessions. It may help to take your pain medicine 30 to 60 minutes before these activities. Reduce your stress by getting enough sleep, working on hobbies you enjoy and practicing relaxation or meditation. Talk to your care team about ways to manage your pain beyond prescription opioids.    These medicines have risks:    DO NOT drive when on new or higher doses of pain medicine. These medicines can affect your alertness and reaction times, and you could be arrested for driving under the influence (DUI). If you need to use opioids long-term, talk  to your care team about driving.    DO NOT operate heavy machinery    DO NOT do any other dangerous activities while taking these medicines.    DO NOT drink any alcohol while taking these medicines.     If the opioid prescribed includes acetaminophen, DO NOT take with any other medicines that contain acetaminophen. Read all labels carefully. Look for the word  acetaminophen  or  Tylenol.  Ask your pharmacist if you have questions or are unsure.    You can get addicted to pain medicines, especially if you have a history of addiction (chemical, alcohol or substance dependence). Talk to your care team about ways to reduce this risk.    All opioids tend to cause constipation. Drink plenty of water and eat foods that have a lot of fiber, such as fruits, vegetables, prune juice, apple juice and high-fiber cereal. Take a laxative (Miralax, milk of magnesia, Colace, Senna) if you don t move your bowels at least every other day. Other side effects include upset stomach, sleepiness, dizziness, throwing up, tolerance (needing more of the medicine to have the same effect), physical dependence and slowed breathing.    Store your pills in a secure place, locked if possible. We will not replace any lost or stolen medicine. If you don t finish your medicine, please throw away (dispose) as directed by your pharmacist. The [place] Pollution [employer] has more information about safe disposal: https://www.pca.[facility].[place].us/living-green/managing-unwanted-medications             Medication List: This is a list of all your medications and when to take them. Check marks below indicate your daily home schedule. Keep this list as a reference.      Medications           Morning Afternoon Evening Bedtime As Needed    * albuterol (2.5 MG/3ML) 0.083% neb solution   Take 1 vial (2.5 mg) by nebulization every 6 hours as needed for shortness of breath / dyspnea or wheezing                                * albuterol 108 (90 Base) MCG/ACT  inhaler   Commonly known as:  VENTOLIN HFA   INHALE 2 PUFFS EVERY 4-6 HOURS AS NEEDED FOR SHORTNESS OF BREATH                                fluticasone 100 MCG/BLIST Aepb   Commonly known as:  FLOVENT DISKUS   Inhale 1 puff into the lungs 2 times daily   Last time this was given:  1 puff on 8/17/2018  9:00 PM                                hydrOXYzine 25 MG tablet   Commonly known as:  ATARAX   Take 2 tablets (50 mg) by mouth nightly as needed for itching or other (sleep)                                ibuprofen 200 MG tablet   Commonly known as:  ADVIL/MOTRIN   Take 3 tablets (600 mg) by mouth every 6 hours as needed for other (carmping)   Last time this was given:  800 mg on 8/19/2018  3:55 AM                                oxyCODONE IR 5 MG tablet   Commonly known as:  ROXICODONE   Take 1-2 tablets (5-10 mg) by mouth every 3 hours as needed for pain   Last time this was given:  10 mg on 8/19/2018  5:45 AM                                prenatal multivitamin plus iron 27-0.8 MG Tabs per tablet   Take 1 tablet by mouth daily                                RABEprazole 20 MG EC tablet   Commonly known as:  ACIPHEX   Take 1 tablet (20 mg) by mouth daily                                senna-docusate 8.6-50 MG per tablet   Commonly known as:  SENOKOT-S;PERICOLACE   Take 1 tablet by mouth 2 times daily as needed for constipation   Last time this was given:  1 tablet on 8/18/2018  8:00 PM                                * Notice:  This list has 2 medication(s) that are the same as other medications prescribed for you. Read the directions carefully, and ask your doctor or other care provider to review them with you.

## 2018-08-17 NOTE — ANESTHESIA CARE TRANSFER NOTE
Patient: Katerin Hintontrom    Procedure(s):   Section and Tubal Sterilization - Wound Class: II-Clean Contaminated    Diagnosis: term pregnancy and multiparity sterilization  Diagnosis Additional Information: No value filed.    Anesthesia Type:   Spinal     Note:  Airway :Room Air  Patient transferred to:PACU  Comments: Patient's VSS. Spontaneous respirations. Patient awake and oriented. IV patent. Report to RACHNA Deng.Handoff Report: Identifed the Patient, Identified the Reponsible Provider, Reviewed the pertinent medical history, Discussed the surgical course, Reviewed Intra-OP anesthesia mangement and issues during anesthesia, Set expectations for post-procedure period and Allowed opportunity for questions and acknowledgement of understanding      Vitals: (Last set prior to Anesthesia Care Transfer)    CRNA VITALS  2018 0810 - 2018 0841      2018             Pulse: 91    SpO2: 98 %                Electronically Signed By: SMILEY Langley CRNA  2018  8:41 AM

## 2018-08-17 NOTE — H&P (VIEW-ONLY)
[facility]  [address] [address]  [phone]  Dept: [phone]    PRE-OP EVALUATION:  Today's date: 2018    Katerin Jasso (: 1985) presents for pre-operative evaluation assessment as requested by Dr. Walton.  She requires evaluation and anesthesia risk assessment prior to undergoing surgery/procedure for treatment of csection .    Proposed Surgery/ Procedure: csection  Date of Surgery/ Procedure: 18  Time of Surgery/ Procedure: 730  Hospital/Surgical Facility: [facility]    Primary Physician: No Ref-Primary, Physician  Type of Anesthesia Anticipated: Local    Patient has a Health Care Directive or Living Will:  NO    1. NO - Do you have a history of heart attack, stroke, stent, bypass or surgery on an artery in the head, neck, heart or legs?  2. NO - Do you ever have any pain or discomfort in your chest?  3. NO - Do you have a history of  Heart Failure?  4. Yes due pregnancy - Are you troubled by shortness of breath when: walking on the level, up a slight hill or at night?  5. NO - Do you currently have a cold, bronchitis or other respiratory infection?  6. Yes from allergies and heat - Do you have a cough, shortness of breath or wheezing?  7. NO - Do you sometimes get pains in the calves of your legs when you walk?  8. NO - Do you or anyone in your family have previous history of blood clots?  9. NO - Do you or does anyone in your family have a serious bleeding problem such as prolonged bleeding following surgeries or cuts?  10. Early on in pregnancy - Have you ever had problems with anemia or been told to take iron pills?  11. NO - Have you had any abnormal blood loss such as black, tarry or bloody stools, or abnormal vaginal bleeding?  12. NO - Have you ever had a blood transfusion?  13. NO - Have you or any of your relatives ever had problems with anesthesia?  14. NO - Do you have sleep apnea, excessive snoring or daytime drowsiness?  15. NO - Do  you have any prosthetic heart valves?  16. NO - Do you have prosthetic joints?  17. YES - Is there any chance that you may be pregnant?      HPI:     HPI related to upcoming procedure: repeat CS and PPTL at term      See problem list for active medical problems.  Problems all longstanding and stable, except as noted/documented.  See ROS for pertinent symptoms related to these conditions.                                                                                                                                                          .    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 Colpo and f/u paps NIL     Tobacco use disorder 2015    Quit date 2015 !      Urinary tract bacterial infections     age [age]     Past Surgical History:   Procedure Laterality Date      SECTION N/A 12/10/2015    Procedure:  SECTION;  Surgeon: Norma Sharma MD;  Location: [facility] OR     MOUTH SURGERY      wisdom teeth     Current Outpatient Prescriptions   Medication Sig Dispense Refill     albuterol (VENTOLIN HFA) 108 (90 Base) MCG/ACT Inhaler INHALE 2 PUFFS EVERY 4-6 HOURS AS NEEDED FOR SHORTNESS OF BREATH 1 Inhaler 5     Prenatal Vit-Fe  "Fumarate-FA (PRENATAL MULTIVITAMIN PLUS IRON) 27-0.8 MG TABS per tablet Take 1 tablet by mouth daily       RABEprazole (ACIPHEX) 20 MG EC tablet Take 1 tablet (20 mg) by mouth daily 30 tablet 1     albuterol (2.5 MG/3ML) 0.083% neb solution Take 1 vial (2.5 mg) by nebulization every 6 hours as needed for shortness of breath / dyspnea or wheezing (Patient not taking: Reported on 8/14/2018) 60 vial 1     fluticasone (FLOVENT DISKUS) 100 MCG/BLIST AEPB Inhale 1 puff into the lungs 2 times daily (Patient not taking: Reported on 8/14/2018) 3 Inhaler 3     OTC products: None, except as noted above    Allergies   Allergen Reactions     Cipro [Ciprofloxacin] Hives     Depotest [Testosterone] Hives     No respiratory symptoms      Septra [Bactrim] Hives     Latex Swelling and Rash      Latex Allergy: YES: Precautions to take:     Social History   Substance Use Topics     Smoking status: Former Smoker     Packs/day: 0.50     Types: Cigarettes     Quit date: 1/5/2018     Smokeless tobacco: Never Used      Comment: quit with pregnancy -2018     Alcohol use Yes      Comment: rare- quit with pregnancy     History   Drug Use No       REVIEW OF SYSTEMS:   Constitutional, neuro, ENT, endocrine, pulmonary, cardiac, gastrointestinal, genitourinary, musculoskeletal, integument and psychiatric systems are negative, except as otherwise noted.    EXAM:   /81  Pulse 123  Temp 98.2  F (36.8  C)  Resp 18  Ht 5' 4\" (1.626 m)  Wt 152 lb (68.9 kg)  LMP 11/01/2017  BMI 26.09 kg/m2    GENERAL APPEARANCE: healthy, alert and no distress     EYES: EOMI, PERRL     NECK: no adenopathy, no asymmetry, masses, or scars and thyroid normal to palpation     RESP: lungs clear to auscultation - no rales, rhonchi or wheezes     BREAST: Deferred     CV: regular rates and rhythm, normal S1 S2, no S3 or S4 and no murmur, click or rub     ABDOMEN:  soft, nontender, no HSM or masses and bowel sounds normal     : normal cervix, adnexae, and uterus " without masses or discharge and rectal exam normal without masses-guaiac negative stool     MS: extremities normal- no gross deformities noted, no evidence of inflammation in joints, FROM in all extremities.     SKIN: no suspicious lesions or rashes     NEURO: Normal strength and tone, sensory exam grossly normal, mentation intact and speech normal     PSYCH: mentation appears normal. and affect normal/bright     LYMPHATICS: No cervical adenopathy    DIAGNOSTICS:   No labs or EKG required for low risk surgery (cataract, skin procedure, breast biopsy, etc)    Recent Labs   Lab Test  05/15/18   1018  01/26/18   1116   12/10/15   1420   HGB  10.9*  12.8   < >  11.9   PLT  252  282   < >  214   CR   --    --    --   0.43*    < > = values in this interval not displayed.        IMPRESSION:   Reason for surgery/procedure: Repeat CS and PPTL    The proposed surgical procedure is considered LOW risk.    REVISED CARDIAC RISK INDEX  The patient has the following serious cardiovascular risks for perioperative complications such as (MI, PE, VFib and 3  AV Block):  No serious cardiac risks  INTERPRETATION: 0 risks: Class I (very low risk - 0.4% complication rate)    The patient has the following additional risks for perioperative complications:  No identified additional risks      ICD-10-CM    1. Preop general physical exam Z01.818        RECOMMENDATIONS:         --Patient is to take all scheduled medications on the day of surgery EXCEPT for modifications listed below.    APPROVAL GIVEN to proceed with proposed procedure, without further diagnostic evaluation       Signed Electronically by: Nikita Walton MD    Copy of this evaluation report is provided to requesting physician.    [facility] Preop Guidelines    Revised Cardiac Risk Index

## 2018-08-17 NOTE — IP AVS SNAPSHOT
[facility]    [address] [address]    Phone:  [phone]    Fax:  [phone]                                       After Visit Summary   8/17/2018    Katerin Jasso    MRN: [id number]           After Visit Summary Signature Page     I have received my discharge instructions, and my questions have been answered. I have discussed any challenges I see with this plan with the nurse or doctor.    ..........................................................................................................................................  Patient/Patient Representative Signature      ..........................................................................................................................................  Patient Representative Print Name and Relationship to Patient    ..................................................               ................................................  Date                                            Time    ..........................................................................................................................................  Reviewed by Signature/Title    ...................................................              ..............................................  Date                                                            Time

## 2018-08-17 NOTE — OP NOTE
[facility] Obstetrics  Operative Note    Pre-operative diagnosis: term pregnancy and multiparity sterilization   Post-operative diagnosis: Same   Procedure: Repeat low transverse  section  Bilateral tubal ligation   Surgeon: Nikita Walton MD   Assistant(s): Mary JUAREZ PA-C  A surgical assistant was required for this surgery for his experience with retraction, achievement of hemostasis, and wound closure     Anesthesia: Spinal anesthesia   Estimated blood loss: 384 ml   Total IV fluids: (See anesthesia record)  1450   Blood transfusion: No transfusion was given during surgery   Total urine output: (See anesthesia record)  300ml   Drains: Melara catheter   Specimens: right and left portion of Fallopian Tube   Findings: Live   Female  Cephalic presentation:  left occiput anterior  APGARS: 1 minute: 9   5 minute: 9  Weight: 6# 8oz  Placenta: nl  Tubes: nl  Uterus: nl  Ovaries: nl   Complications: None   Condition: Infant stable, transferred to general care nursery  Mother stable, transfered to post-anesthesia recovery   Comments: Katerin Jasso   1985  [id number]      Katerin Jasso  presented for the above procedure.  She has previous CS and desires a tubal ligation.  I met with Katerin  and her , Anastacio and discussed the planned procedure as well as the expected post operative course.  Risks of complications were noted and postoperative signs to watch for outlined.  Questions were answered and consent signed.  She was taken to the OR [facility] where she was placed in the supine position. She underwent Spinal anesthesia and was placed in the supine LL tilt position.  An examination under anesthesia was performed that showed: Vertex term fetus.  She was prepped and draped.  A timeout was held confirming her identity and proposed procedures. All were in agreement.   After adequate anesthesia was documented, a Pfannenstiel incision was made through the previous scar  and carried down through the subcutaneous tissue to the fascia which was transversely incised.  The fascia was freed from the underlying rectus muscles cephalad caudad direction.  Midline diastases of the identified and the underlying peritoneum grasped tented and entered without difficulty.  A bladder blade was placed.  A low segment transverse incision was made carried down through the membranes ruptured productive of clear fluid.  Uterine incision was extended by finger fracture technique.  The fetal head was elevated into the wound using a Kiwi suction and delivered at 759 a viable vigorous female.  Delayed cord clamping was performed.  Cord was doubly clamped cut  handed to nurse in attendance.  Weight was 6 lbs. 8 oz. Apgars of 9 and 9.   remained in the operating room and went to the post partum area with mother.    Cord blood was obtained.  The placenta, which was attached to the posterior wall was delivered manually.  The uterus was exteriorized and the endometrium soft curetted with a lap sponge.  The uterus was closed primarily with running locked 0 Vicryl suture and hemostasis assured at this point.    I then reconfirmed with the patient, her desire for permanent sterilization.  She answered in the affirmative.  Left fallopian tube was grasped with Babcocks and an avascular portion of mesosalpinx was identified.  Through this was passed O plain gut.  Proximal and distal ligature were tied, the tube was excised and sent for pathologic evaluation.  Hemostasis was assured with a combination of electrocautery and sutures of 2-0 Vicryl.  An identical procedure was carried out on the right.  After assuring hemostasis, the uterus was returned to its anatomic position  And hemostasis again reassured.  Peritoneum was closed with 2-0 Vicryl running suture.  The rectus muscle reapproximated to the midline using interrupted 2-0 Vicryl.  The fascia was then closed from the apices to the midline and tied  individually with #1 Vicryl.  Subcu was closed with interrupted 0 chromic gut and the skin reapproximated utilizing 3-0V lock suture and Dermabond applied.  Quantifiable blood loss was 384 mils  Crystalloid 1450 and urine output was 300 cc.  Sponge needle and instrument counts were correct.  Patient then transferred to the PACU in good condition with her  in her arms.    Nikita Walton

## 2018-08-17 NOTE — PROGRESS NOTES
Pt  P:1 38+6 arrives to birthplace with her  for scheduled repeat c/s.  Denies contractions, LOF & VB.  Reports (+) FM.  EFM applied, oriented to room.  IV started (R) lower FA, bolus infusing.  Risks/benefits discussed, consent signed.  FHT category 1, verified by RACHNA Bejarano.  Mother intends to bottle feed.  Report given to oncoming RN.

## 2018-08-17 NOTE — ANESTHESIA POSTPROCEDURE EVALUATION
Patient: Katerin Hintontrom    Procedure(s):   Section and Tubal Sterilization - Wound Class: II-Clean Contaminated    Diagnosis:term pregnancy and multiparity sterilization  Diagnosis Additional Information: No value filed.    Anesthesia Type:  Spinal    Note:  Anesthesia Post Evaluation    Patient location during evaluation: Bedside  Patient participation: Able to fully participate in evaluation  Level of consciousness: awake and alert  Pain management: adequate  Airway patency: patent  Cardiovascular status: acceptable  Respiratory status: acceptable  Hydration status: acceptable  PONV: none     Anesthetic complications: None          Last vitals:  Vitals:    18 1130 18 1230 18 1330   BP: 120/64 93/42 108/58   Pulse:      Resp:  16 16   Temp:      SpO2: 99% 97% 98%         Electronically Signed By: SMILEY Langley CRNA  2018  2:01 PM

## 2018-08-17 NOTE — PLAN OF CARE
Problem: Patient Care Overview  Goal: Individualization & Mutuality  S:Delivery  B:No Labor,38w6d    Lab Results   Component Value Date    GBS Negative 2018    with antibiotic treatment not indicated 4 hours prior to delivery.  A: Patient delivered Repeat C/S at 0759 with Dr. ROYA Walton in attendance and baby placed on mother's abdomen for delayed cord clamping. Baby received from surgeon. Baby to warmer for drying and wrapped in blanket.. Placed skin to skin @ 0815.. Apgars 9/9.  IV infusion of Oxytocin  infused. Placenta removal manual. See Flowsheet for VS and PP checks. Labor care plan goals met, transition now to postpartum care.  R: Expect routine postpartum care. Anticipate first feeding within the hour or whenever infant displays feeding cues. Continue skin to skin. Prior discussion with mother indicates that feeding plan is Formula feeding.

## 2018-08-17 NOTE — ANESTHESIA PREPROCEDURE EVALUATION
Anesthesia Evaluation     . Pt has had prior anesthetic. Type: Regional    No history of anesthetic complications          ROS/MED HX    ENT/Pulmonary:     (+)allergic rhinitis, tobacco use, Past use Intermittent asthma Treatment: Inhaler prn,  , . .    Neurologic:  - neg neurologic ROS     Cardiovascular:  - neg cardiovascular ROS       METS/Exercise Tolerance:     Hematologic:  - neg hematologic  ROS       Musculoskeletal:  - neg musculoskeletal ROS       GI/Hepatic:     (+) GERD Asymptomatic on medication,       Renal/Genitourinary:  - ROS Renal section negative       Endo:  - neg endo ROS       Psychiatric:  - neg psychiatric ROS       Infectious Disease:  - neg infectious disease ROS       Malignancy:      - no malignancy   Other: Comment: Results for NATO FLEMING (MRN [id number]) as of 8/17/2018 07:16    8/17/2018 06:25  WBC: 14.4 (H)  Hemoglobin: 11.2 (L)  Hematocrit: 33.3 (L)  Platelet Count: 238                      Physical Exam  Normal systems: cardiovascular, pulmonary and dental    Airway   Mallampati: II  TM distance: >3 FB  Neck ROM: full    Dental     Cardiovascular       Pulmonary                     Anesthesia Plan      History & Physical Review  History and physical reviewed and following examination; no interval change.    ASA Status:  2 .    NPO Status:  > 8 hours    Plan for Spinal   PONV prophylaxis:  Ondansetron (or other 5HT-3)       Postoperative Care  Postoperative pain management:  Neuraxial analgesia.      Consents  Anesthetic plan, risks, benefits and alternatives discussed with:  Patient.  Use of blood products discussed: No .   .                          .

## 2018-08-17 NOTE — PLAN OF CARE
Problem: Patient Care Overview  Goal: Individualization & Mutuality  To OR via ambulation accompanied by CRNA and this writer.

## 2018-08-17 NOTE — ANESTHESIA PROCEDURE NOTES
Peripheral nerve/Neuraxial procedure note : intrathecal  Pre-Procedure  Performed by  ONEAL DING   Location: OR      Pre-Anesthestic Checklist: patient identified, IV checked, risks and benefits discussed, informed consent, monitors and equipment checked and pre-op evaluation    Timeout  Correct Patient: Yes   Correct Procedure: Yes   Correct Site: Yes   Correct Laterality: N/A   Correct Position: Yes   Site Marked: N/A   .   Procedure Documentation  ASA 2  Diagnosis:c section.    Procedure:    Intrathecal.  Insertion Site:L3-4  (midline approach)      Patient Prep;mask, sterile gloves, povidone-iodine 7.5% surgical scrub, patient draped.  .  Needle: Brenda tip Spinal Needle (gauge): 25  Spinal/LP Needle Length (inches): 3.5 # of attempts: 1 and # of redirects:  Introducer used Introducer: 20 G .       Assessment/Narrative  Paresthesias: No.  .  .  clear CSF fluid removed . Time Injected: 07:42  Comments:  Pt tolerated procedure well. FHT stable.

## 2018-08-18 LAB — HGB BLD-MCNC: 9.7 G/DL (ref 11.7–15.7)

## 2018-08-18 PROCEDURE — 59514 CESAREAN DELIVERY ONLY: CPT | Mod: AS | Performed by: PHYSICIAN ASSISTANT

## 2018-08-18 PROCEDURE — 36415 COLL VENOUS BLD VENIPUNCTURE: CPT | Performed by: OBSTETRICS & GYNECOLOGY

## 2018-08-18 PROCEDURE — 25000132 ZZH RX MED GY IP 250 OP 250 PS 637: Performed by: OBSTETRICS & GYNECOLOGY

## 2018-08-18 PROCEDURE — 85018 HEMOGLOBIN: CPT | Performed by: OBSTETRICS & GYNECOLOGY

## 2018-08-18 PROCEDURE — 12000027 ZZH R&B OB

## 2018-08-18 PROCEDURE — 25000128 H RX IP 250 OP 636: Performed by: OBSTETRICS & GYNECOLOGY

## 2018-08-18 PROCEDURE — 58611 LIGATE OVIDUCT(S) ADD-ON: CPT | Performed by: OBSTETRICS & GYNECOLOGY

## 2018-08-18 PROCEDURE — 58611 LIGATE OVIDUCT(S) ADD-ON: CPT | Mod: AS | Performed by: PHYSICIAN ASSISTANT

## 2018-08-18 PROCEDURE — 59510 CESAREAN DELIVERY: CPT | Performed by: OBSTETRICS & GYNECOLOGY

## 2018-08-18 RX ADMIN — SENNOSIDES AND DOCUSATE SODIUM 1 TABLET: 8.6; 5 TABLET ORAL at 07:37

## 2018-08-18 RX ADMIN — SENNOSIDES AND DOCUSATE SODIUM 1 TABLET: 8.6; 5 TABLET ORAL at 20:00

## 2018-08-18 RX ADMIN — SIMETHICONE CHEW TAB 80 MG 80 MG: 80 TABLET ORAL at 23:17

## 2018-08-18 RX ADMIN — SIMETHICONE CHEW TAB 80 MG 80 MG: 80 TABLET ORAL at 07:37

## 2018-08-18 RX ADMIN — OXYCODONE HYDROCHLORIDE 10 MG: 5 TABLET ORAL at 23:17

## 2018-08-18 RX ADMIN — SIMETHICONE CHEW TAB 80 MG 80 MG: 80 TABLET ORAL at 18:09

## 2018-08-18 RX ADMIN — IBUPROFEN 800 MG: 800 TABLET ORAL at 21:44

## 2018-08-18 RX ADMIN — OXYCODONE HYDROCHLORIDE 10 MG: 5 TABLET ORAL at 20:00

## 2018-08-18 RX ADMIN — ACETAMINOPHEN 975 MG: 325 TABLET, FILM COATED ORAL at 00:45

## 2018-08-18 RX ADMIN — IBUPROFEN 800 MG: 800 TABLET ORAL at 15:20

## 2018-08-18 RX ADMIN — OXYCODONE HYDROCHLORIDE 10 MG: 5 TABLET ORAL at 13:57

## 2018-08-18 RX ADMIN — OXYCODONE HYDROCHLORIDE 10 MG: 5 TABLET ORAL at 10:46

## 2018-08-18 RX ADMIN — OXYCODONE HYDROCHLORIDE 5 MG: 5 TABLET ORAL at 04:31

## 2018-08-18 RX ADMIN — KETOROLAC TROMETHAMINE 30 MG: 30 INJECTION, SOLUTION INTRAMUSCULAR at 02:54

## 2018-08-18 RX ADMIN — ACETAMINOPHEN 975 MG: 325 TABLET, FILM COATED ORAL at 16:53

## 2018-08-18 RX ADMIN — SIMETHICONE CHEW TAB 80 MG 80 MG: 80 TABLET ORAL at 13:02

## 2018-08-18 RX ADMIN — OXYCODONE HYDROCHLORIDE 5 MG: 5 TABLET ORAL at 00:45

## 2018-08-18 RX ADMIN — ACETAMINOPHEN 975 MG: 325 TABLET, FILM COATED ORAL at 08:58

## 2018-08-18 RX ADMIN — IBUPROFEN 800 MG: 800 TABLET ORAL at 08:58

## 2018-08-18 RX ADMIN — OXYCODONE HYDROCHLORIDE 10 MG: 5 TABLET ORAL at 07:28

## 2018-08-18 RX ADMIN — OXYCODONE HYDROCHLORIDE 10 MG: 5 TABLET ORAL at 16:53

## 2018-08-18 NOTE — PLAN OF CARE
Problem: Patient Care Overview  Goal: Plan of Care/Patient Progress Review  Outcome: Improving  Patient progressing well. Has dangled, stood and took steps in room last evening before bed.   Pad changed, rubra small amount. Mccarty catheter intact and draining clear yellow urine. Discontinued mccarty at this time. Incision clean dry and intact with no redness or drainage. Holding infant and Dad assisting with baby cares. Bonding well with infant. Bottle feeding is going well.  Patient rates Incisional pain comfortably manageable and gas pain tolerable with discomfort.  Taking Toradol, oxycodone and tylenol when due with good relief.  Made plan with patient to bring pain medication when due. Patient encouraged to report increased bleeding.      Problem: Postpartum ( Delivery) (Adult,Obstetrics,Pediatric)  Goal: Signs and Symptoms of Listed Potential Problems Will be Absent, Minimized or Managed (Postpartum)  Signs and symptoms of listed potential problems will be absent, minimized or managed by discharge/transition of care (reference Postpartum ( Delivery) (Adult,Obstetrics,Pediatric) CPG).   Outcome: Improving  Pain was relieved with prescribed analgesics.  Patient weepy this am due to missing her 2 year old at home.

## 2018-08-18 NOTE — PLAN OF CARE
Problem: Patient Care Overview  Goal: Individualization & Mutuality  Outcome: Improving  Data: Vital signs within normal limits. Postpartum checks within normal limits - see flow record. Patient eating and drinking normally. Patient able to empty bladder independently. + flatus . Patient ambulating independently. Denies calve tenderness.   No apparent signs of infection. Incision healing well. Patient Is performing self cares and Is able to care for infant. Positive attachment behaviors are observed with infant. Support persons are present.  Action:  Pain plan was discussed. Patient would like pain meds to be brought in when they are due. Patient was medicated during the shift for pain. See MAR.Patient education done about  cares, postpartum cares and pain management/plan. See flow record.  Response:   Patient reassessed within 1 hour after each medication for pain. Patient stated that pain had improved. Patient stated that she was comfortable. .   Plan: Anticipate discharge on 18.

## 2018-08-18 NOTE — PLAN OF CARE
Problem: Postpartum ( Delivery) (Adult,Obstetrics,Pediatric)  Goal: Signs and Symptoms of Listed Potential Problems Will be Absent, Minimized or Managed (Postpartum)  Signs and symptoms of listed potential problems will be absent, minimized or managed by discharge/transition of care (reference Postpartum ( Delivery) (Adult,Obstetrics,Pediatric) CPG).   Outcome: Improving  Late entry due to patient care.   pt up to in room with SBA, up sink to brush teeth and wash her face.  Missy cares were done.  Pt VSS.  Incision C/D/I.

## 2018-08-18 NOTE — PROGRESS NOTES
[facility] Obstetrics Post-Op / Progress Note         Assessment and Plan:    Assessment:   Post-operative day #1  Low transverse repeat  section  Post-partum tubal ligation  L&D complications: Intrauterine pregnancy at 39 weeks gestation      Doing well.  Clean wound without signs of infection.  Normal healing wound.  No immediate surgical complications identified.  No excessive bleeding  Pain well-controlled.      Plan:   Ambulation encouraged  Breast feeding strategies discussed  Anticipate discharge tomorrow           Interval History:   Doing well.  Pain is well-controlled.  No fevers.  No history of wound drainage, warmth or significant erythema.  Good appetite.  Denies chest pain, shortness of breath, nausea or vomiting.  Ambulatory.  Breastfeeding well.          Significant Problems:    None          Review of Systems:    The patient denies any chest pain, shortness of breath, excessive pain, fever, chills, purulent drainage from the wound, nausea or vomiting.          Medications:   All medications related to the patient's surgery have been reviewed          Physical Exam:   Vitals were reviewed  All vitals stable  Temp: 98  F (36.7  C) Temp src: Oral BP: 112/77 Pulse: 65 Heart Rate: 84 Resp: 18 SpO2: 97 %      Wound clean and dry with minimal or no drainage.  Surrounding skin with minimal erythema.  Constitutional:   awake, alert, cooperative, no apparent distress, and appears stated age     Lungs:   No increased work of breathing, good air exchange, clear to auscultation bilaterally, no crackles or wheezing     Cardiovascular:   Normal apical impulse, regular rate and rhythm, normal S1 and S2, no S3 or S4, and no murmur noted     Abdomen:   Incision intact and dry, normal bowel sounds, soft and distended     Genitounirinary:   External Genitalia:  General appearance; normal     Musculoskeletal:   There is no redness, warmth, or swelling of the joints.  Full range of motion noted.  Motor  strength is 5 out of 5 all extremities bilaterally.  Tone is normal.     Neurologic:   Awake, alert, oriented to name, place and time.  Cranial nerves II-XII are grossly intact.  Motor is 5 out of 5 bilaterally.  Cerebellar finger to nose, heel to shin intact.  Sensory is intact.  Babinski down going, Romberg negative, and gait is normal.     Neuropsychiatric:   General: normal, calm and normal eye contact  Level of consciousness: alert / normal  Affect: normal  Orientation: oriented to self, place, time and situation  Memory and insight: normal, memory for past and recent events intact and thought process normal             Data:     All laboratory data related to this surgery reviewed  Hemoglobin   Date Value Ref Range Status   08/18/2018 9.7 (L) 11.7 - 15.7 g/dL Final   08/17/2018 11.2 (L) 11.7 - 15.7 g/dL Final   05/15/2018 10.9 (L) 11.7 - 15.7 g/dL Final   01/26/2018 12.8 11.7 - 15.7 g/dL Final   07/28/2016 14.3 11.7 - 15.7 g/dL Final         Nikita Walton MD

## 2018-08-18 NOTE — L&D DELIVERY NOTE
"Katerin Jasso is a 33 year old  @ 39 Weeks EGA  She presented for repeat CS and PPTL  GBS neg  Membrane status: intact  Labored with:   Pain meds Spinal  FHR Cat !  Delivered a viable Female  @ 07:59 via repeat CS  Weight 6#8 oz   APGAR's 9/9  Placenta delivered @ 08:00 , was complete with a 3vessel cord  See op note for complete details   ml  \"Veda Chaveze\"  Delivery Summary    Katerin Jasso MRN# [id number]   Age: 33 year old YOB: 1985     ASSESSMENT & PLAN:         Labor Length    3rd Stage (hrs):  0 (min):  1      Delivery/Placenta Date and Time    Delivery Date:  18 Delivery Time:   7:59 AM   Placenta Date/Time:  2018  8:00 AM   Oxytocin given at the time of delivery:  after delivery of baby      Apgars    Living status:  Living    1 Minute 5 Minute 10 Minute 15 Minute 20 Minute   Skin color: 1  1       Heart rate: 2  2       Reflex irritability: 2  2       Muscle tone: 2  2       Respiratory effort: 2  2       Total: 9  9          Apgars assigned by:  ROSENDO RAHMAN RN; SAMIRA QUINTANA PNP      Cord    Vessels:  3 Vessels Complications:  None   Cord Blood Disposition:  Lab Gases Sent?:  No         [place] Resuscitation    Methods:  None       Care at Delivery:   after spinal      [place] Measurements    Weight:  6 lb 8 oz Length:  1' 7\"   Head circumference:  35.6 cm    Observed anomalies, comments:  PNP in attendance at delivery for repeat .  Infant delivered, delayed cord clamping, and brought to warmer for further drying/stimulation.  Infant crying with good color, Apgars 9 and 9, no interventions needed.      Skin to Skin and Feeding Plan    Skin to skin initiation date/time: 18 0815   Skin to skin with:  Father, Mother   Skin to skin end date/time:     How do you plan to feed your baby:  Formula      Labor Events and Shoulder Dystocia    Fetal Tracing Prior to Delivery:  Category 1            Delivery (Maternal) (Provider to Complete) " ([id number])          Mother's Information  Mother: Katerin Jasso #[id number]    Start of Mother's Information     IO Blood Loss  18 0754 - 18 0759    Mom's I/O Activity            End of Mother's Information  Mother: Katerin Jasso #[id number]            Delivery - Provider to Complete ([id number])    Delivering clinician:  JANAY BLUNT   Attempted Delivery Types (Choose all that apply):  Other   Delivery Type (Choose the 1 that will go to the Birth History):  , Low Transverse                      Specifics:  Repeat   Indications for Repeat:  39+ weeks /Planned repeat   Other personnel:   Provider Role   CORAL SAHA Physician Assistant            Placenta    Delayed Cord Clamping:  Done   Date/Time:  2018  8:00 AM   Removal:  Manual Removal   Disposition:  Hospital disposal      Anesthesia    Method:  Spinal         Presentation and Position    Presentation:  Vertex   Position:  Left Occiput Anterior                    Janay Blunt MD

## 2018-08-19 VITALS
BODY MASS INDEX: 24.99 KG/M2 | WEIGHT: 150 LBS | HEART RATE: 65 BPM | HEIGHT: 65 IN | OXYGEN SATURATION: 97 % | TEMPERATURE: 98.3 F | SYSTOLIC BLOOD PRESSURE: 122 MMHG | RESPIRATION RATE: 16 BRPM | DIASTOLIC BLOOD PRESSURE: 73 MMHG

## 2018-08-19 PROCEDURE — 25000132 ZZH RX MED GY IP 250 OP 250 PS 637: Performed by: OBSTETRICS & GYNECOLOGY

## 2018-08-19 RX ORDER — AMOXICILLIN 250 MG
1 CAPSULE ORAL 2 TIMES DAILY PRN
Qty: 60 TABLET | Refills: 0 | Status: SHIPPED | OUTPATIENT
Start: 2018-08-19 | End: 2018-09-25

## 2018-08-19 RX ORDER — OXYCODONE HYDROCHLORIDE 5 MG/1
5-10 TABLET ORAL
Qty: 10 TABLET | Refills: 0 | Status: SHIPPED | OUTPATIENT
Start: 2018-08-19 | End: 2018-09-25

## 2018-08-19 RX ORDER — IBUPROFEN 200 MG
600 TABLET ORAL EVERY 6 HOURS PRN
COMMUNITY
Start: 2018-08-19 | End: 2021-03-26

## 2018-08-19 RX ADMIN — OXYCODONE HYDROCHLORIDE 10 MG: 5 TABLET ORAL at 02:09

## 2018-08-19 RX ADMIN — OXYCODONE HYDROCHLORIDE 10 MG: 5 TABLET ORAL at 08:24

## 2018-08-19 RX ADMIN — ACETAMINOPHEN 975 MG: 325 TABLET, FILM COATED ORAL at 02:09

## 2018-08-19 RX ADMIN — IBUPROFEN 800 MG: 800 TABLET ORAL at 03:55

## 2018-08-19 RX ADMIN — OXYCODONE HYDROCHLORIDE 10 MG: 5 TABLET ORAL at 05:45

## 2018-08-19 NOTE — DISCHARGE SUMMARY
[facility] Discharge Summary    Katerin Jasso MRN# [id number]   Age: 33 year old YOB: 1985     Date of Admission:  2018  Date of Discharge::  2018  Admitting Physician:  Nikita Walton MD  Discharge Physician:  Nikita Walton MD     Home clinic: [facility]          Admission Diagnoses:   term pregnancy and multiparity sterilization  Pregnancy  Prevoious  section          Discharge Diagnosis:   Intrauterine pregnancy at 39 weeks gestation  Repeat  section  Request for permanent sterilization  Scheduled  section          Procedures:   Procedure(s): Bilateral tubal ligation  Repeat low transverse  section       No other procedures performed during this admission           Medications Prior to Admission:     Prescriptions Prior to Admission   Medication Sig Dispense Refill Last Dose     albuterol (2.5 MG/3ML) 0.083% neb solution Take 1 vial (2.5 mg) by nebulization every 6 hours as needed for shortness of breath / dyspnea or wheezing 60 vial 1  at Unknown time     albuterol (VENTOLIN HFA) 108 (90 Base) MCG/ACT Inhaler INHALE 2 PUFFS EVERY 4-6 HOURS AS NEEDED FOR SHORTNESS OF BREATH 1 Inhaler 5 2018 at 0800     fluticasone (FLOVENT DISKUS) 100 MCG/BLIST AEPB Inhale 1 puff into the lungs 2 times daily 3 Inhaler 3 2018 at 2000     hydrOXYzine (ATARAX) 25 MG tablet Take 2 tablets (50 mg) by mouth nightly as needed for itching or other (sleep) 30 tablet 0 2018 at      Prenatal Vit-Fe Fumarate-FA (PRENATAL MULTIVITAMIN PLUS IRON) 27-0.8 MG TABS per tablet Take 1 tablet by mouth daily   2018 at      RABEprazole (ACIPHEX) 20 MG EC tablet Take 1 tablet (20 mg) by mouth daily 30 tablet 1 2018 at 1100             Discharge Medications:     Current Discharge Medication List      START taking these medications    Details   ibuprofen (ADVIL/MOTRIN) 200 MG tablet Take 3 tablets (600 mg) by mouth every 6 hours  as needed for other (carmping)    Associated Diagnoses: S/P  section      oxyCODONE IR (ROXICODONE) 5 MG tablet Take 1-2 tablets (5-10 mg) by mouth every 3 hours as needed for pain  Qty: 10 tablet, Refills: 0    Associated Diagnoses: S/P  section      senna-docusate (SENOKOT-S;PERICOLACE) 8.6-50 MG per tablet Take 1 tablet by mouth 2 times daily as needed for constipation  Qty: 60 tablet, Refills: 0    Associated Diagnoses: S/P  section         CONTINUE these medications which have NOT CHANGED    Details   albuterol (2.5 MG/3ML) 0.083% neb solution Take 1 vial (2.5 mg) by nebulization every 6 hours as needed for shortness of breath / dyspnea or wheezing  Qty: 60 vial, Refills: 1    Associated Diagnoses: Bronchitis, acute, with bronchospasm      albuterol (VENTOLIN HFA) 108 (90 Base) MCG/ACT Inhaler INHALE 2 PUFFS EVERY 4-6 HOURS AS NEEDED FOR SHORTNESS OF BREATH  Qty: 1 Inhaler, Refills: 5    Associated Diagnoses: Mild intermittent asthma without complication      fluticasone (FLOVENT DISKUS) 100 MCG/BLIST AEPB Inhale 1 puff into the lungs 2 times daily  Qty: 3 Inhaler, Refills: 3    Associated Diagnoses: Mild intermittent asthma without complication      hydrOXYzine (ATARAX) 25 MG tablet Take 2 tablets (50 mg) by mouth nightly as needed for itching or other (sleep)  Qty: 30 tablet, Refills: 0    Associated Diagnoses: Prenatal care, subsequent pregnancy, unspecified trimester      Prenatal Vit-Fe Fumarate-FA (PRENATAL MULTIVITAMIN PLUS IRON) 27-0.8 MG TABS per tablet Take 1 tablet by mouth daily    Associated Diagnoses: Prenatal care, subsequent pregnancy in first trimester      RABEprazole (ACIPHEX) 20 MG EC tablet Take 1 tablet (20 mg) by mouth daily  Qty: 30 tablet, Refills: 1    Associated Diagnoses: Gastroesophageal reflux disease without esophagitis                   Consultations:   No consultations were requested during this admission          Brief History of Labor or Admission:  "  Katerin Jasso is a 33 year old  @ 39 Weeks EGA  She presented for repeat CS and PPTL  GBS neg  Membrane status: intact  Labored with:   Pain meds Spinal  FHR Cat !  Delivered a viable Female  @ 07:59 via repeat CS  Weight 6#8 oz   APGAR's 9/9  Placenta delivered @ 08:00 , was complete with a 3vessel cord  See op note for complete details   ml  \"Veda Chaveze\"           Hospital Course:   The patient's hospital course was unremarkable.  She recovered as anticipated and experienced no post-operative complications.  On discharge, her pain was well controlled. Vaginal bleeding is similar to peak menstrual flow.  Voiding without difficulty.  Ambulating well and tolerating a normal diet.  No fever or significant wound drainage.  Bottle feeding well.  Infant is stable.  Bowel movement yest.  She was discharged on post-partum day #2.  /64  Pulse 65  Temp 97.9  F (36.6  C) (Oral)  Resp 16  Ht 1.638 m (5' 4.5\")  Wt 68 kg (150 lb)  LMP 2017  SpO2 97%  Breastfeeding? Unknown  BMI 25.35 kg/m2  Exam:  Constitutional: healthy, alert and no distress  Cardiovascular: negative, PMI normal. No lifts, heaves, or thrills. RRR. No murmurs, clicks gallops or rub  Respiratory: negative, Percussion normal. Good diaphragmatic excursion. Lungs clear  Gastrointestinal: Abdomen soft, non-tender. BS normal. No masses, organomegaly Incision intact FF@U-3  : Deferred  Musculoskeletal: extremities normal- no gross deformities noted, gait normal and normal muscle tone  Skin: no suspicious lesions or rashes  Neurologic: Gait normal. Reflexes normal and symmetric. Sensation grossly WNL.  Psychiatric: mentation appears normal and affect normal/bright    Post-partum hemoglobin:   Hemoglobin   Date Value Ref Range Status   2018 9.7 (L) 11.7 - 15.7 g/dL Final             Discharge Instructions and Follow-Up:   Discharge diet: Regular   Discharge activity: Lifting restricted to 15 pounds  No heavy " lifting for 6 week(s)  Pelvic rest: abstain from intercourse and do not use tampons for 6 week(s)   Discharge follow-up: Follow up with me in 6 weeks   Wound care: Drink plenty of fluids  Ice to area for comfort           Discharge Disposition:   Discharged to home      Attestation:  I have reviewed today's vital signs, notes, medications, labs and imaging.  Amount of time performed on this discharge summary: 10 minutes.    Nikita Walton MD

## 2018-08-19 NOTE — PLAN OF CARE
Problem: Postpartum ( Delivery) (Adult,Obstetrics,Pediatric)  Goal: Signs and Symptoms of Listed Potential Problems Will be Absent, Minimized or Managed (Postpartum)  Signs and symptoms of listed potential problems will be absent, minimized or managed by discharge/transition of care (reference Postpartum ( Delivery) (Adult,Obstetrics,Pediatric) CPG).   Outcome: Improving  VSS, postpartum assessments WDL - see flowsheet.  Pt ambulating independently in room; independent with self and infant cares.  Tolerating regular diet; no nausea +flatus.  Fundus firm, midline, U/1; small amount of bleeding, denies clots.  Incision clean and dry; no s/s of infection.  Education provided re s/s of infection.   Pt preferred to be woken overnight for pain medication; tylenol, PRN ibuprofen, oxycodone, mylicon administered for incision and gas pain. Pt found to be sleeping between cares; reported pain adequately managed with medication.  Pt bonding appropriately with infant.   Plan for discharge home 18

## 2018-08-19 NOTE — PLAN OF CARE
Problem: Patient Care Overview  Goal: Individualization & Mutuality  Patient discharged per ambulatory with infant in car seat. Mother verified that her band matches her infant's band by comparing the infant's  band#.  Discharge instructions given. Encouraged to call for any problems, questions or concerns. RXs filled at [facility].

## 2018-08-20 LAB — COPATH REPORT: NORMAL

## 2018-08-21 ENCOUNTER — TELEPHONE (OUTPATIENT)
Dept: OBGYN | Facility: CLINIC | Age: 33
End: 2018-08-21

## 2018-08-21 DIAGNOSIS — Z98.891 H/O: C-SECTION: Primary | ICD-10-CM

## 2018-08-21 NOTE — TELEPHONE ENCOUNTER
"Return call to patient.  Spoke with patient on the phone.    S-(situation): patient has been taking 1 tablet of oxycodone in the morning and one tablet at night. Patient is not taking as frequently as she desires as she \"is afraid of running out.\" Patient also taking Tylenol and Ibuprofen only once per day. Patient reports pain at worse one hour ago was 9/10. Patient couldn't take it so took 1 oxycodone 60 minutes ago. Pain now 6/10. Patient reports temperature 100.0 about 20 minutes ago. Patient reports she was feeling chilled so she took her temperature. Patient does not feel ill. Patient reports  looked at incision and states \" it looks really good.\" patient denies drainage or redness. Patient denies nausea or vomiting.Patient reports recent bowel movement this morning. Patient using stool softeners. Patient reports bottle feeding - milk came in yesterday morning, breasts feeling slightly engorged and painful.Patient denies UTI symptoms. Patient reports lochia is \" not bad at all.\" Patient denies passing clots.    B-(background):  section with tubal ligation 18    A-(assessment): post op pain not well controlled    R-(recommendations): Reassurance provided. Reviewed pain management plan with pt regarding Roxicodone, Tylenol and Ibuprofen usage. Encouraged patient to rotate Tylenol/oxycodone with Ibuprofen taking one combo every 3 hours. Monitor temperature if feeling ill or new symptoms develop. Will check in with pt tomorrow and check with Dr. Walton tomorrow for refill on Roxicodone. Encouraged patient to rest and take it easy. Stay well hydrated and maintain good nutrition.     Dr. Walton - Can patient have refill on Roxicodone, only received 10 to go home from hospital with?    Pt in agreement and reports understanding.    Laila Collins   [facility]  RN      "

## 2018-08-21 NOTE — TELEPHONE ENCOUNTER
Reason for Call:  Other call back    Detailed comments: Pt had a  on 18 and now today she has developed a fever of 100 degrees, should she be seen or go to urgent care or could something be called in?     Phone Number Patient can be reached at: Cell number on file:    Telephone Information:   Mobile [phone]       Best Time: today    Can we leave a detailed message on this number? YES    Call taken on 2018 at 3:31 PM by Ayde Kiser

## 2018-08-22 RX ORDER — OXYCODONE HYDROCHLORIDE 5 MG/1
5-10 TABLET ORAL EVERY 6 HOURS PRN
Qty: 20 TABLET | Refills: 0 | Status: SHIPPED | OUTPATIENT
Start: 2018-08-22 | End: 2018-09-25

## 2018-08-22 NOTE — TELEPHONE ENCOUNTER
"Prescription refilled by Dr. Walton  Prescription brought to the pharmacy downstairs.    Patient notified.  Patient reports \" I am better than yesterday.\"  Patient reports rotating pain medicatiosn as discussed yesterday.  Patient reports temperature today was 99.7.  Patient continues to monitor.  Patient does not feel ill.  Patient does not have any symptoms of infection.  Reviewed monitoring symptoms, what to watch for and what to call clinic for.    Pt in agreement and reports understanding.    Laila Collins   [facility]  RN    "

## 2018-09-25 ENCOUNTER — PRENATAL OFFICE VISIT (OUTPATIENT)
Dept: OBGYN | Facility: CLINIC | Age: 33
End: 2018-09-25
Payer: COMMERCIAL

## 2018-09-25 VITALS
TEMPERATURE: 97.9 F | HEIGHT: 65 IN | HEART RATE: 101 BPM | SYSTOLIC BLOOD PRESSURE: 123 MMHG | RESPIRATION RATE: 16 BRPM | DIASTOLIC BLOOD PRESSURE: 84 MMHG | WEIGHT: 135.2 LBS | BODY MASS INDEX: 22.53 KG/M2

## 2018-09-25 DIAGNOSIS — Z98.891 S/P CESAREAN SECTION: Primary | ICD-10-CM

## 2018-09-25 PROBLEM — Z30.2 ENCOUNTER FOR STERILIZATION: Status: RESOLVED | Noted: 2018-05-15 | Resolved: 2018-09-25

## 2018-09-25 PROBLEM — Z34.80 PRENATAL CARE, SUBSEQUENT PREGNANCY: Status: RESOLVED | Noted: 2018-01-18 | Resolved: 2018-09-25

## 2018-09-25 PROBLEM — Z30.2 ENCOUNTER FOR STERILIZATION: Status: RESOLVED | Noted: 2018-08-14 | Resolved: 2018-09-25

## 2018-09-25 PROBLEM — Z34.90 PREGNANCY: Status: RESOLVED | Noted: 2018-08-17 | Resolved: 2018-09-25

## 2018-09-25 PROCEDURE — 99207 ZZC POST PARTUM EXAM: CPT | Performed by: OBSTETRICS & GYNECOLOGY

## 2018-09-25 ASSESSMENT — ANXIETY QUESTIONNAIRES
6. BECOMING EASILY ANNOYED OR IRRITABLE: NOT AT ALL
2. NOT BEING ABLE TO STOP OR CONTROL WORRYING: NOT AT ALL
7. FEELING AFRAID AS IF SOMETHING AWFUL MIGHT HAPPEN: NOT AT ALL
5. BEING SO RESTLESS THAT IT IS HARD TO SIT STILL: NOT AT ALL
3. WORRYING TOO MUCH ABOUT DIFFERENT THINGS: NOT AT ALL
1. FEELING NERVOUS, ANXIOUS, OR ON EDGE: NOT AT ALL
IF YOU CHECKED OFF ANY PROBLEMS ON THIS QUESTIONNAIRE, HOW DIFFICULT HAVE THESE PROBLEMS MADE IT FOR YOU TO DO YOUR WORK, TAKE CARE OF THINGS AT HOME, OR GET ALONG WITH OTHER PEOPLE: NOT DIFFICULT AT ALL
GAD7 TOTAL SCORE: 0

## 2018-09-25 ASSESSMENT — PATIENT HEALTH QUESTIONNAIRE - PHQ9: 5. POOR APPETITE OR OVEREATING: NOT AT ALL

## 2018-09-25 NOTE — MR AVS SNAPSHOT
"              After Visit Summary   2018    Katerin Jasso    MRN: [id number]           Patient Information     Date Of Birth          1985        Visit Information        Provider Department      2018 9:30 AM Nikita Walton MD [facility]        Today's Diagnoses     S/P  section    -  1    Routine postpartum follow-up           Follow-ups after your visit        Follow-up notes from your care team     Return in about 1 year (around 2019).      Who to contact     If you have questions or need follow up information about today's clinic visit or your schedule please contact [facility] directly at [phone].  Normal or non-critical lab and imaging results will be communicated to you by MyChart, letter or phone within 4 business days after the clinic has received the results. If you do not hear from us within 7 days, please contact the clinic through Sp[employer] or phone. If you have a critical or abnormal lab result, we will notify you by phone as soon as possible.  Submit refill requests through [email or web address] or call your pharmacy and they will forward the refill request to us. Please allow 3 business days for your refill to be completed.          Additional Information About Your Visit        MyChart Information     [email or web address] gives you secure access to your electronic health record. If you see a primary care provider, you can also send messages to your care team and make appointments. If you have questions, please call your primary care clinic.  If you do not have a primary care provider, please call [phone] and they will assist you.        Care EveryWhere ID     This is your Care EveryWhere ID. This could be used by other organizations to access your [facility] medical records  [id number]        Your Vitals Were     Pulse Temperature Respirations Height Last Period Breastfeeding?    101 97.9  F (36.6  C) 16 5' 4.5\" (1.638 m) 2017 No    BMI " (Body Mass Index)                   22.85 kg/m2            Blood Pressure from Last 3 Encounters:   09/25/18 123/84   08/19/18 122/73   08/14/18 127/81    Weight from Last 3 Encounters:   09/25/18 135 lb 3.2 oz (61.3 kg)   08/17/18 150 lb (68 kg)   08/14/18 152 lb (68.9 kg)              Today, you had the following     No orders found for display         Today's Medication Changes          These changes are accurate as of 9/25/18 10:50 AM.  If you have any questions, ask your nurse or doctor.               Stop taking these medicines if you haven't already. Please contact your care team if you have questions.     hydrOXYzine 25 MG tablet   Commonly known as:  ATARAX   Stopped by:  Nikita Walton MD           oxyCODONE IR 5 MG tablet   Commonly known as:  ROXICODONE   Stopped by:  Nikita Walton MD           prenatal multivitamin plus iron 27-0.8 MG Tabs per tablet   Stopped by:  Nikita Walton MD           RABEprazole 20 MG EC tablet   Commonly known as:  ACIPHEX   Stopped by:  Nikita Walton MD           senna-docusate 8.6-50 MG per tablet   Commonly known as:  SENOKOT-S;PERICOLACE   Stopped by:  Nikita Walton MD                    Primary Care Provider Fax #    Physician No Ref-Primary [phone]       No address on file        Equal Access to Services     [facility] AH: Hadii adriana alvarez hadsilo Somisty, waaxda luqadaha, qaybta kaalmada adeegyada, cheryl gupta . So [facility] [phone].    ATENCIÓN: Si habla español, tiene a kee disposición servicios gratuitos de asistencia lingüística. Llame al [phone].    We comply with applicable federal civil rights laws and [place] laws. We do not discriminate on the basis of race, color, national origin, age, disability, sex, sexual orientation, or gender identity.            Thank you!     Thank you for choosing [facility]  for your care. Our goal is always to provide you with excellent care.  Hearing back from our patients is one way we can continue to improve our services. Please take a few minutes to complete the written survey that you may receive in the mail after your visit with us. Thank you!             Your Updated Medication List - Protect others around you: Learn how to safely use, store and throw away your medicines at www.disposemymeds.org.          This list is accurate as of 18 10:50 AM.  Always use your most recent med list.                   Brand Name Dispense Instructions for use Diagnosis    * albuterol (2.5 MG/3ML) 0.083% neb solution     60 vial    Take 1 vial (2.5 mg) by nebulization every 6 hours as needed for shortness of breath / dyspnea or wheezing    Bronchitis, acute, with bronchospasm       * albuterol 108 (90 Base) MCG/ACT inhaler    VENTOLIN HFA    1 Inhaler    INHALE 2 PUFFS EVERY 4-6 HOURS AS NEEDED FOR SHORTNESS OF BREATH    Mild intermittent asthma without complication       fluticasone 100 MCG/BLIST Aepb    FLOVENT DISKUS    3 Inhaler    Inhale 1 puff into the lungs 2 times daily    Mild intermittent asthma without complication       ibuprofen 200 MG tablet    ADVIL/MOTRIN     Take 3 tablets (600 mg) by mouth every 6 hours as needed for other (carmping)    S/P  section       * Notice:  This list has 2 medication(s) that are the same as other medications prescribed for you. Read the directions carefully, and ask your doctor or other care provider to review them with you.

## 2018-09-25 NOTE — PROGRESS NOTES
"SUBJECTIVE:  Katerin Jasso is a 33 year old female  here for a postpartum visit.  She had a  Section  on 2018 delivering a healthy baby girl weighing 6 lbs 8 oz at term.      delivery complications:  No  breast feeding:  formula   bladder problems:  No  bowel problems/hemorrhoids:  No  episiotomy/laceration/incision healed? Yes:   vaginal flow:  None  [place]:  Yes,   contraception:  tubal ligation  emotional adjustment:  doing well and happy  back to work:  Oct 22    OBJECTIVE:  Blood pressure 123/84, pulse 101, temperature 97.9  F (36.6  C), resp. rate 16, height 5' 4.5\" (1.638 m), weight 135 lb 3.2 oz (61.3 kg), last menstrual period 2017, not currently breastfeeding.   General - pleasant female in no acute distress.  Breast - no nodularity, asymmetry or nipple discharge bilaterally.  Abdomen - soft, nontender, nondistended, no hepatosplenomegaly.  Pelvic - EG: normal adult female, BUS: within normal limits, Vagina: well rugated, no discharge, Cervix: no lesions or CMT, Uterus: firm, normal sized and nontender, Adnexae: no masses or tenderness.  Rectovaginal - deferred.    ASSESSMENT:  normal postpartum exam    PLAN:  Discussed kegel exercises   May resume normal activities without restrictions  Pap smear was not  done today    The patient will use tubal ligation for birth control. Full counseling was provided, and all questions answered. Compliance is strongly emphasized.  Return to clinic in one year for an annual, when due for a pap smear or PRN.    Nikita Walton    "

## 2018-09-26 ASSESSMENT — PATIENT HEALTH QUESTIONNAIRE - PHQ9: SUM OF ALL RESPONSES TO PHQ QUESTIONS 1-9: 1

## 2018-09-26 ASSESSMENT — ANXIETY QUESTIONNAIRES: GAD7 TOTAL SCORE: 0

## 2018-10-23 ENCOUNTER — ALLIED HEALTH/NURSE VISIT (OUTPATIENT)
Dept: FAMILY MEDICINE | Facility: CLINIC | Age: 33
End: 2018-10-23
Payer: COMMERCIAL

## 2018-10-23 DIAGNOSIS — Z11.1 SCREENING EXAMINATION FOR PULMONARY TUBERCULOSIS: Primary | ICD-10-CM

## 2018-10-23 PROCEDURE — 86580 TB INTRADERMAL TEST: CPT

## 2018-10-23 PROCEDURE — 99207 ZZC NO CHARGE NURSE ONLY: CPT

## 2018-10-23 NOTE — MR AVS SNAPSHOT
After Visit Summary   10/23/2018    Katerin Jasso    MRN: [id number]           Patient Information     Date Of Birth          1985        Visit Information        Provider Department      10/23/2018 8:15 AM Jayne/Lpn, Fl [facility]        Today's Diagnoses     Screening examination for pulmonary tuberculosis    -  1       Follow-ups after your visit        Your next 10 appointments already scheduled     Oct 26, 2018  8:15 AM CDT   Nurse Only with Fl Ll Cma/Lpn   [facility] ([facility])    [address] [address]   [phone]              Who to contact     Normal or non-critical lab and imaging results will be communicated to you by [employer], letter or phone within 4 business days after the clinic has received the results. If you do not hear from us within 7 days, please contact the clinic through [employer] or phone. If you have a critical or abnormal lab result, we will notify you by phone as soon as possible.  Submit refill requests through Sounday or call your pharmacy and they will forward the refill request to us. Please allow 3 business days for your refill to be completed.          If you need to speak with a  for additional information , please call: [phone]           Additional Information About Your Visit        [employer] Information     [employer] gives you secure access to your electronic health record. If you see a primary care provider, you can also send messages to your care team and make appointments. If you have questions, please call your primary care clinic.  If you do not have a primary care provider, please call [phone] and they will assist you.        Care EveryWhere ID     This is your Care EveryWhere ID. This could be used by other organizations to access your [facility] medical records  [id number]         Blood Pressure from Last 3 Encounters:   09/25/18 123/84   08/19/18  122/73   08/14/18 127/81    Weight from Last 3 Encounters:   09/25/18 135 lb 3.2 oz (61.3 kg)   08/17/18 150 lb (68 kg)   08/14/18 152 lb (68.9 kg)              We Performed the Following     TB INTRADERMAL TEST        Primary Care Provider Fax #    Physician No Ref-Primary [phone]       No address on file        Equal Access to Services     [facility] TAWANA : Hadii aad ku hadasho Soomaali, waaxda luqadaha, qaybta kaalmada adeegyada, waxay idiin farnazn ademila joyce laZainabsalud . So [facility] [phone].    ATENCIÓN: Si habla español, tiene a kee disposición servicios gratuitos de asistencia lingüística. Napoleoname al [phone].    We comply with applicable federal civil rights laws and [place] laws. We do not discriminate on the basis of race, color, national origin, age, disability, sex, sexual orientation, or gender identity.            Thank you!     Thank you for choosing [facility]  for your care. Our goal is always to provide you with excellent care. Hearing back from our patients is one way we can continue to improve our services. Please take a few minutes to complete the written survey that you may receive in the mail after your visit with us. Thank you!             Your Updated Medication List - Protect others around you: Learn how to safely use, store and throw away your medicines at www.disposemymeds.org.          This list is accurate as of 10/23/18 10:26 AM.  Always use your most recent med list.                   Brand Name Dispense Instructions for use Diagnosis    * albuterol (2.5 MG/3ML) 0.083% neb solution     60 vial    Take 1 vial (2.5 mg) by nebulization every 6 hours as needed for shortness of breath / dyspnea or wheezing    Bronchitis, acute, with bronchospasm       * albuterol 108 (90 Base) MCG/ACT inhaler    VENTOLIN HFA    1 Inhaler    INHALE 2 PUFFS EVERY 4-6 HOURS AS NEEDED FOR SHORTNESS OF BREATH    Mild intermittent asthma without complication       fluticasone 100 MCG/BLIST  Aepb    FLOVENT DISKUS    3 Inhaler    Inhale 1 puff into the lungs 2 times daily    Mild intermittent asthma without complication       ibuprofen 200 MG tablet    ADVIL/MOTRIN     Take 3 tablets (600 mg) by mouth every 6 hours as needed for other (carmping)    S/P  section       * Notice:  This list has 2 medication(s) that are the same as other medications prescribed for you. Read the directions carefully, and ask your doctor or other care provider to review them with you.

## 2018-10-23 NOTE — NURSING NOTE
The patient is asked the following questions today and these are her answers:    -Have you had a mantoux administered in the past 30 days?    No  -Have you had a previous positive Mantoux.  No  -Have you received BCG in the past.  No  -Have you had a live vaccine  (MMR, Varicella, OPV, Yellow Fever) in the last 6 weeks.  No  -Have you had and active  viral or bacterial infection in the past 6 weeks.  No  -Have you received corticosteroids or immunosuppressive agents in the past 6 weeks.  No  -Have you been diagnosed with HIV?  No  -Do you have a maglinancy?  No     Patient was instructed to return between 48-72 after administration for reading of results.    Wendie Daily CMA ([facility])

## 2018-10-26 ENCOUNTER — ALLIED HEALTH/NURSE VISIT (OUTPATIENT)
Dept: FAMILY MEDICINE | Facility: CLINIC | Age: 33
End: 2018-10-26
Payer: COMMERCIAL

## 2018-10-26 DIAGNOSIS — Z11.1 SCREENING EXAMINATION FOR PULMONARY TUBERCULOSIS: Primary | ICD-10-CM

## 2018-10-26 LAB
PPDINDURATION: 0 MM (ref 0–5)
PPDREDNESS: 0 MM

## 2018-10-26 PROCEDURE — 99207 ZZC NO CHARGE NURSE ONLY: CPT

## 2018-10-26 NOTE — PROGRESS NOTES
Mantoux results: No induration.  No swelling.  No redness.    Negative,     Rosalinda Martines RN

## 2019-01-29 DIAGNOSIS — J45.20 MILD INTERMITTENT ASTHMA WITHOUT COMPLICATION: ICD-10-CM

## 2019-01-29 NOTE — TELEPHONE ENCOUNTER
"FLOVENT 100 MCG DISKUS      Last Written Prescription Date:  12/13/17  Last Fill Quantity: 3,   # refills: 3  Last Office Visit: 12/13/17  Future Office visit:       Requested Prescriptions   Pending Prescriptions Disp Refills     FLOVENT DISKUS 100 MCG/BLIST inhaler [Pharmacy Med Name: FLOVENT 100 MCG DISKUS]  2     Sig: INHALE 1 PUFF INTO THE LUNGS 2 TIMES DAILY    Inhaled Steroids Protocol Failed - 1/29/2019  4:41 PM       Failed - Asthma control assessment score within normal limits in last 6 months    Please review ACT score.          Passed - Patient is age 12 or older       Passed - Medication is active on med list       Passed - Recent (6 mo) or future (30 days) visit within the authorizing provider's specialty    Patient had office visit in the last 6 months or has a visit in the next 30 days with authorizing provider or within the authorizing provider's specialty.  See \"Patient Info\" tab in inbasket, or \"Choose Columns\" in Meds & Orders section of the refill encounter.              "

## 2019-01-30 NOTE — TELEPHONE ENCOUNTER
Medication is being filled for 1 time refill only due to:  Patient needs to be seen because it has been more than one year since last visit. 12/13/17  Omar King RN

## 2019-06-07 ENCOUNTER — OFFICE VISIT (OUTPATIENT)
Dept: FAMILY MEDICINE | Facility: CLINIC | Age: 34
End: 2019-06-07
Payer: COMMERCIAL

## 2019-06-07 VITALS
WEIGHT: 146.8 LBS | TEMPERATURE: 98.8 F | BODY MASS INDEX: 24.46 KG/M2 | HEIGHT: 65 IN | SYSTOLIC BLOOD PRESSURE: 139 MMHG | DIASTOLIC BLOOD PRESSURE: 84 MMHG | HEART RATE: 93 BPM

## 2019-06-07 DIAGNOSIS — R76.8 ELEVATED ANTINUCLEAR ANTIBODY (ANA) LEVEL: ICD-10-CM

## 2019-06-07 DIAGNOSIS — M25.552 PAIN OF BOTH HIP JOINTS: Primary | ICD-10-CM

## 2019-06-07 DIAGNOSIS — M25.551 PAIN OF BOTH HIP JOINTS: Primary | ICD-10-CM

## 2019-06-07 LAB
CRP SERPL-MCNC: 3 MG/L (ref 0–8)
ERYTHROCYTE [SEDIMENTATION RATE] IN BLOOD BY WESTERGREN METHOD: 14 MM/H (ref 0–20)

## 2019-06-07 PROCEDURE — 36415 COLL VENOUS BLD VENIPUNCTURE: CPT | Performed by: FAMILY MEDICINE

## 2019-06-07 PROCEDURE — 86038 ANTINUCLEAR ANTIBODIES: CPT | Performed by: FAMILY MEDICINE

## 2019-06-07 PROCEDURE — 85652 RBC SED RATE AUTOMATED: CPT | Performed by: FAMILY MEDICINE

## 2019-06-07 PROCEDURE — 99213 OFFICE O/P EST LOW 20 MIN: CPT | Performed by: FAMILY MEDICINE

## 2019-06-07 PROCEDURE — 86140 C-REACTIVE PROTEIN: CPT | Performed by: FAMILY MEDICINE

## 2019-06-07 PROCEDURE — 86039 ANTINUCLEAR ANTIBODIES (ANA): CPT | Performed by: FAMILY MEDICINE

## 2019-06-07 PROCEDURE — 86431 RHEUMATOID FACTOR QUANT: CPT | Performed by: FAMILY MEDICINE

## 2019-06-07 ASSESSMENT — MIFFLIN-ST. JEOR: SCORE: 1363.82

## 2019-06-07 NOTE — PROGRESS NOTES
SUBJECTIVE:                                                    Katerin Jasso is a 33 year old female who presents to clinic today for the following health issues:    Chief Complaint   Patient presents with     Swelling     left cheek swelling, started this morning. Thinks this may be from grinding teeth 2 nights ago.      Problem list and histories reviewed & adjusted, as indicated.  Additional history:     Patient Active Problem List   Diagnosis     Allergic rhinitis     Post viral asthma     GERD (gastroesophageal reflux disease)     CARDIOVASCULAR SCREENING; LDL GOAL LESS THAN 160     Mild intermittent asthma without complication     S/P  section     Past Surgical History:   Procedure Laterality Date      SECTION N/A 12/10/2015    Procedure:  SECTION;  Surgeon: Norma Sharma MD;  Location: [facility]      SECTION, TUBAL LIGATION, COMBINED N/A 2018    Procedure: COMBINED  SECTION, TUBAL LIGATION;   Section and Tubal Sterilization;  Surgeon: Nikita Walton MD;  Location: [facility]     MOUTH SURGERY      wisdom teeth       Social History     Tobacco Use     Smoking status: Former Smoker     Packs/day: 0.50     Types: Cigarettes     Last attempt to quit: 2018     Years since quittin.4     Smokeless tobacco: Never Used     Tobacco comment: quit with pregnancy -2018   Substance Use Topics     Alcohol use: Yes     Comment: rare- quit with pregnancy     Family History   Problem Relation Age of Onset     Allergies Mother         food allergies as well as seasonal and bees     Asthma Mother      Hypertension Father      C.A.D. Father      Gastrointestinal Disease Father         IBS- ulcer     Hypertension Paternal Grandmother      Arthritis Paternal Grandmother      Cerebrovascular Disease Paternal Grandmother      Cancer Paternal Grandfather         bladder cancer     Alzheimer Disease Paternal Grandfather      Tuberculosis Maternal Grandfather       "Diabetes No family hx of      Breast Cancer No family hx of      Cancer - colorectal No family hx of          Current Outpatient Medications   Medication Sig Dispense Refill     albuterol (2.5 MG/3ML) 0.083% neb solution Take 1 vial (2.5 mg) by nebulization every 6 hours as needed for shortness of breath / dyspnea or wheezing 60 vial 1     albuterol (VENTOLIN HFA) 108 (90 Base) MCG/ACT Inhaler INHALE 2 PUFFS EVERY 4-6 HOURS AS NEEDED FOR SHORTNESS OF BREATH 1 Inhaler 5     FLOVENT DISKUS 100 MCG/BLIST inhaler INHALE 1 PUFF INTO THE LUNGS 2 TIMES DAILY 1 Inhaler 0     ibuprofen (ADVIL/MOTRIN) 200 MG tablet Take 3 tablets (600 mg) by mouth every 6 hours as needed for other (carmping)       Allergies   Allergen Reactions     Provera [Medroxyprogesterone] Hives     Cipro [Ciprofloxacin] Hives     Septra [Bactrim] Hives     Latex Swelling and Rash       ROS:  Constitutional, HEENT, cardiovascular, pulmonary, gi and gu systems are negative, except as otherwise noted.    OBJECTIVE:                                                    /84   Pulse 93   Temp 98.8  F (37.1  C) (Tympanic)   Ht 1.638 m (5' 4.5\")   Wt 66.6 kg (146 lb 12.8 oz)   BMI 24.81 kg/m   Body mass index is 24.81 kg/m .   GENERAL: healthy, alert, well nourished, well hydrated, no distress  HENT: ear canals- normal; TMs- normal; Nose- normal; Mouth- no ulcers, no lesions  NECK: no tenderness, no adenopathy, no asymmetry, no masses, no stiffness; thyroid- normal to palpation  RESP: lungs clear to auscultation - no rales, no rhonchi, no wheezes  CV: regular rates and rhythm, normal S1 S2, no S3 or S4 and no murmur, no click or rub -  ABDOMEN: soft, no tenderness, no  hepatosplenomegaly, no masses, normal bowel sounds       ASSESSMENT/PLAN:                                                      (M25.551,  M25.552) Pain of both hip joints  (primary encounter diagnosis)  Does have f/h of inflam arthritis Plan: Rheumatoid factor, Anti Nuclear Jyothi IgG by IFA "         with Reflex, CRP, inflammation, ESR:         Erythrocyte sedimentation rate, RHEUMATOLOGY         REFERRAL      (R76.8) Elevated antinuclear antibody (MARTHA) level  Plan: RHEUMATOLOGY REFERRAL               reports that she quit smoking about 17 months ago. Her smoking use included cigarettes. She smoked 0.50 packs per day. She has never used smokeless tobacco.          Matteo Montero M.D.  [facility], [place]

## 2019-06-08 ASSESSMENT — ASTHMA QUESTIONNAIRES: ACT_TOTALSCORE: 20

## 2019-06-10 LAB — RHEUMATOID FACT SER NEPH-ACNC: <20 IU/ML (ref 0–20)

## 2019-06-11 LAB
ANA PAT SER IF-IMP: ABNORMAL
ANA SER QL IF: POSITIVE
ANA TITR SER IF: ABNORMAL {TITER}

## 2019-06-14 ENCOUNTER — OFFICE VISIT (OUTPATIENT)
Dept: FAMILY MEDICINE | Facility: CLINIC | Age: 34
End: 2019-06-14
Payer: COMMERCIAL

## 2019-06-14 VITALS
BODY MASS INDEX: 25.3 KG/M2 | HEART RATE: 93 BPM | TEMPERATURE: 98 F | WEIGHT: 148.2 LBS | DIASTOLIC BLOOD PRESSURE: 78 MMHG | SYSTOLIC BLOOD PRESSURE: 116 MMHG | HEIGHT: 64 IN

## 2019-06-14 DIAGNOSIS — R76.8 ANA POSITIVE: ICD-10-CM

## 2019-06-14 DIAGNOSIS — R53.83 FATIGUE, UNSPECIFIED TYPE: Primary | ICD-10-CM

## 2019-06-14 LAB
ALBUMIN SERPL-MCNC: 3.9 G/DL (ref 3.4–5)
ALP SERPL-CCNC: 67 U/L (ref 40–150)
ALT SERPL W P-5'-P-CCNC: 20 U/L (ref 0–50)
ANION GAP SERPL CALCULATED.3IONS-SCNC: 6 MMOL/L (ref 3–14)
AST SERPL W P-5'-P-CCNC: 16 U/L (ref 0–45)
BASOPHILS # BLD AUTO: 0 10E9/L (ref 0–0.2)
BASOPHILS NFR BLD AUTO: 0.2 %
BILIRUB SERPL-MCNC: 0.3 MG/DL (ref 0.2–1.3)
BUN SERPL-MCNC: 14 MG/DL (ref 7–30)
CALCIUM SERPL-MCNC: 8.8 MG/DL (ref 8.5–10.1)
CHLORIDE SERPL-SCNC: 108 MMOL/L (ref 94–109)
CO2 SERPL-SCNC: 23 MMOL/L (ref 20–32)
CREAT SERPL-MCNC: 0.62 MG/DL (ref 0.52–1.04)
DIFFERENTIAL METHOD BLD: ABNORMAL
EOSINOPHIL # BLD AUTO: 0.2 10E9/L (ref 0–0.7)
EOSINOPHIL NFR BLD AUTO: 2.5 %
ERYTHROCYTE [DISTWIDTH] IN BLOOD BY AUTOMATED COUNT: 15.8 % (ref 10–15)
FERRITIN SERPL-MCNC: 7 NG/ML (ref 12–150)
GFR SERPL CREATININE-BSD FRML MDRD: >90 ML/MIN/{1.73_M2}
GLUCOSE SERPL-MCNC: 86 MG/DL (ref 70–99)
HCT VFR BLD AUTO: 33.2 % (ref 35–47)
HGB BLD-MCNC: 10.4 G/DL (ref 11.7–15.7)
LYMPHOCYTES # BLD AUTO: 2.9 10E9/L (ref 0.8–5.3)
LYMPHOCYTES NFR BLD AUTO: 30.4 %
MCH RBC QN AUTO: 26.1 PG (ref 26.5–33)
MCHC RBC AUTO-ENTMCNC: 31.3 G/DL (ref 31.5–36.5)
MCV RBC AUTO: 83 FL (ref 78–100)
MONOCYTES # BLD AUTO: 0.7 10E9/L (ref 0–1.3)
MONOCYTES NFR BLD AUTO: 7.4 %
NEUTROPHILS # BLD AUTO: 5.7 10E9/L (ref 1.6–8.3)
NEUTROPHILS NFR BLD AUTO: 59.5 %
PLATELET # BLD AUTO: 353 10E9/L (ref 150–450)
POTASSIUM SERPL-SCNC: 4.2 MMOL/L (ref 3.4–5.3)
PROT SERPL-MCNC: 7.8 G/DL (ref 6.8–8.8)
RBC # BLD AUTO: 3.99 10E12/L (ref 3.8–5.2)
SODIUM SERPL-SCNC: 137 MMOL/L (ref 133–144)
TSH SERPL DL<=0.005 MIU/L-ACNC: 1.7 MU/L (ref 0.4–4)
WBC # BLD AUTO: 9.6 10E9/L (ref 4–11)

## 2019-06-14 PROCEDURE — 82306 VITAMIN D 25 HYDROXY: CPT | Performed by: PHYSICIAN ASSISTANT

## 2019-06-14 PROCEDURE — 80053 COMPREHEN METABOLIC PANEL: CPT | Performed by: PHYSICIAN ASSISTANT

## 2019-06-14 PROCEDURE — 99214 OFFICE O/P EST MOD 30 MIN: CPT | Performed by: PHYSICIAN ASSISTANT

## 2019-06-14 PROCEDURE — 85025 COMPLETE CBC W/AUTO DIFF WBC: CPT | Performed by: PHYSICIAN ASSISTANT

## 2019-06-14 PROCEDURE — 82728 ASSAY OF FERRITIN: CPT | Performed by: PHYSICIAN ASSISTANT

## 2019-06-14 PROCEDURE — 36415 COLL VENOUS BLD VENIPUNCTURE: CPT | Performed by: PHYSICIAN ASSISTANT

## 2019-06-14 PROCEDURE — 84443 ASSAY THYROID STIM HORMONE: CPT | Performed by: PHYSICIAN ASSISTANT

## 2019-06-14 ASSESSMENT — ANXIETY QUESTIONNAIRES
3. WORRYING TOO MUCH ABOUT DIFFERENT THINGS: SEVERAL DAYS
6. BECOMING EASILY ANNOYED OR IRRITABLE: SEVERAL DAYS
1. FEELING NERVOUS, ANXIOUS, OR ON EDGE: MORE THAN HALF THE DAYS
5. BEING SO RESTLESS THAT IT IS HARD TO SIT STILL: NOT AT ALL
GAD7 TOTAL SCORE: 7
2. NOT BEING ABLE TO STOP OR CONTROL WORRYING: SEVERAL DAYS
7. FEELING AFRAID AS IF SOMETHING AWFUL MIGHT HAPPEN: NOT AT ALL

## 2019-06-14 ASSESSMENT — PATIENT HEALTH QUESTIONNAIRE - PHQ9
SUM OF ALL RESPONSES TO PHQ QUESTIONS 1-9: 6
5. POOR APPETITE OR OVEREATING: MORE THAN HALF THE DAYS

## 2019-06-14 ASSESSMENT — PAIN SCALES - GENERAL: PAINLEVEL: NO PAIN (0)

## 2019-06-14 ASSESSMENT — MIFFLIN-ST. JEOR: SCORE: 1362.23

## 2019-06-14 NOTE — PATIENT INSTRUCTIONS
Try the hydroxyzine at night  mg  Let me know next week if this works  Can try the magnesium again at night. Stop if diarrhea  Consider sleep specialist or sleep therapist/psychologist    BooK; say stefanie to insomnia  Sleep meditation podcasts, CBT-I lele, Isleep lele, Insight timer lele    Good Sleep Hygiene:  ?Sleep as long as necessary to feel rested (usually 7 to 8 hours for adults) and then get out of bed  ?Maintain a regular sleep schedule  ?Try not to force sleep  ?Avoid caffeinated beverages after lunch  ?Avoid alcohol near bedtime (eg, late afternoon and evening)  ?Avoid smoking or other nicotine intake, particularly during the evening  ?Adjust the bedroom environment as needed to decrease stimuli (eg, reduce ambient light, turn off the television or radio)  ?Resolve concerns or worries before bedtime  ?Exercise regularly for at least 20 minutes, preferably more than four to five hours prior to bedtime  ?Avoid daytime naps, especially if they are longer than 20 to 30 minutes or occur late in the day    Stimulus control therapy rules   1. Go to bed only when sleepy.   2. Do not watch television, read, eat, or worry while in bed. Use bed only for sleep and sex.   3. Get out of bed if unable to fall asleep within twenty minutes and go to another room. Return to bed only when sleepy. Repeat this step as many times as necessary throughout the night.   4. Set an alarm clock to wake up at a fixed time each morning including weekends.   5. Do not take a nap during the day.   Progressive muscle relaxation: tensing and relaxing each muscle group  Deep breathing and abdominal breathing    Visit helpguide.org for insomnia tips  http://www.cbtforinsomnia.com/    Other strategies:  Tell yourself that you are going to fall a sleep, Sleep all night and wake rested in the morning, Tell this to yourself at least 8 times before going to bed.   Do your deep breathing Breath in to the count of 3, hold your breath to the  count of 3 and out to the count of 3. Repeat this 2-3 times.    If you are still not sleeping then tighten the muscles from the toes to the head. Hold and then release in the reverse order.  If you wake during the night repeat this.     You can use over the counter Melatonin for sleep for the next couple of weeks and then as needed. Try using this a couple hours before bedtime to facilitate a good nights sleep. This is when your body naturally produces more melatonin. If you do get sleepy/groggy from this, then use right before bed.

## 2019-06-14 NOTE — LETTER
My Asthma Action Plan  Name: Katerin Jasso   YOB: 1985  Date: 6/14/2019   My doctor: Rosario Simeon PA-C   My clinic: [facility]        My Control Medicine: None  My Rescue Medicine: Albuterol (Proair/Ventolin/Proventil) inhaler     My Asthma Severity: intermittent  Avoid your asthma triggers:   upper respiratory infections            GREEN ZONE   Good Control    I feel good    No cough or wheeze    Can work, sleep and play without asthma symptoms       Take your asthma control medicine every day.     1. If exercise triggers your asthma, take your rescue medication    15 minutes before exercise or sports, and    During exercise if you have asthma symptoms  2. Spacer to use with inhaler: If you have a spacer, make sure to use it with your inhaler             YELLOW ZONE Getting Worse  I have ANY of these:    I do not feel good    Cough or wheeze    Chest feels tight    Wake up at night   1. Keep taking your Green Zone medications  2. Start taking your rescue medicine:    every 20 minutes for up to 1 hour. Then every 4 hours for 24-48 hours.  3. If you stay in the Yellow Zone for more than 12-24 hours, contact your doctor.  4. If you do not return to the Green Zone in 12-24 hours or you get worse, start taking your oral steroid medicine if prescribed by your provider.           RED ZONE Medical Alert - Get Help  I have ANY of these:    I feel awful    Medicine is not helping    Breathing getting harder    Trouble walking or talking    Nose opens wide to breathe       1. Take your rescue medicine NOW  2. If your provider has prescribed an oral steroid medicine, start taking it NOW  3. Call your doctor NOW  4. If you are still in the Red Zone after 20 minutes and you have not reached your doctor:    Take your rescue medicine again and    Call 911 or go to the emergency room right away    See your regular doctor within 2 weeks of an Emergency Room or Urgent Care visit for follow-up  treatment.          Annual Reminders:  Meet with Asthma Educator,  Flu Shot in the Fall, consider Pneumonia Vaccination for patients with asthma (aged 19 and older).    Pharmacy:    [facility]  [id number] [place] [address]                      Asthma Triggers  How To Control Things That Make Your Asthma Worse    Triggers are things that make your asthma worse.  Look at the list below to help you find your triggers and what you can do about them.  You can help prevent asthma flare-ups by staying away from your triggers.      Trigger                                                          What you can do   Cigarette Smoke  Tobacco smoke can make asthma worse. Do not allow smoking in your home, car or around you.  Be sure no one smokes at a child s day care or school.  If you smoke, ask your health care provider for ways to help you quit.  Ask family members to quit too.  Ask your health care provider for a referral to Quit Plan to help you quit smoking, or call 9-953-933-PLAN.     Colds, Flu, Bronchitis  These are common triggers of asthma. Wash your hands often.  Don t touch your eyes, nose or mouth.  Get a flu shot every year.     Dust Mites  These are tiny bugs that live in cloth or carpet. They are too small to see. Wash sheets and blankets in hot water every week.   Encase pillows and mattress in dust mite proof covers.  Avoid having carpet if you can. If you have carpet, vacuum weekly.   Use a dust mask and HEPA vacuum.   Pollen and Outdoor Mold  Some people are allergic to trees, grass, or weed pollen, or molds. Try to keep your windows closed.  Limit time out doors when pollen count is high.   Ask you health care provider about taking medicine during allergy season.     Animal Dander  Some people are allergic to skin flakes, urine or saliva from pets with fur or feathers. Keep pets with fur or feathers out of your home.    If you can t keep the pet outdoors, then keep the  pet out of your bedroom.  Keep the bedroom door closed.  Keep pets off cloth furniture and away from stuffed toys.     Mice, Rats, and Cockroaches  Some people are allergic to the waste from these pests.   Cover food and garbage.  Clean up spills and food crumbs.  Store grease in the refrigerator.   Keep food out of the bedroom.   Indoor Mold  This can be a trigger if your home has high moisture. Fix leaking faucets, pipes, or other sources of water.   Clean moldy surfaces.  Dehumidify basement if it is damp and smelly.   Smoke, Strong Odors, and Sprays  These can reduce air quality. Stay away from strong odors and sprays, such as perfume, powder, hair spray, paints, smoke incense, paint, cleaning products, candles and new carpet.   Exercise or Sports  Some people with asthma have this trigger. Be active!  Ask your doctor about taking medicine before sports or exercise to prevent symptoms.    Warm up for 5-10 minutes before and after sports or exercise.     Other Triggers of Asthma  Cold air:  Cover your nose and mouth with a scarf.  Sometimes laughing or crying can be a trigger.  Some medicines and food can trigger asthma.

## 2019-06-14 NOTE — PROGRESS NOTES
"SUBJECTIVE:                                                    Katerin Jasso is a 33 year old female who presents to clinic today for the following health issues:    Chief Complaint   Patient presents with     Other     discuss hormone levels     **Since she had her baby about 10 months ago, she says her hormones have been off since. Her anxiety has been bad and she has been gaining weight. She is also having a hard time sleeping at night.    **She is wanting to discuss on getting her levels checked.        * she called specialist for rheum - earliest appointment Oct    She is not breastfeeding  Periods have been regular  She had tubal ligation - she has now heavy period for 2 days which is different  She is not taking multi or iron    Exercise and diet \"not great\"  Bad sleep since giving birth, \"pure anxiety\" that she can't sleep  If she falls asleep she is okay. Lays down between 9-10 and falls asleep 1-2, wakes up at 5:30   Has tried: zquil, melatonin, unisom,  Hydroxyzine 50mg in August, she restarted that recently and it makes no difference.     Problem list and histories reviewed & adjusted, as indicated.  Additional history: none    Patient Active Problem List   Diagnosis     Allergic rhinitis     Post viral asthma     GERD (gastroesophageal reflux disease)     CARDIOVASCULAR SCREENING; LDL GOAL LESS THAN 160     Mild intermittent asthma without complication     S/P  section     Past Surgical History:   Procedure Laterality Date      SECTION N/A 12/10/2015    Procedure:  SECTION;  Surgeon: Norma Sharma MD;  Location: [facility]      SECTION, TUBAL LIGATION, COMBINED N/A 2018    Procedure: COMBINED  SECTION, TUBAL LIGATION;   Section and Tubal Sterilization;  Surgeon: Nikita Walton MD;  Location: [facility]     MOUTH SURGERY      wisdom teeth       Social History     Tobacco Use     Smoking status: Former Smoker     Packs/day: 0.50     Types: " "Cigarettes     Last attempt to quit: 2018     Years since quittin.4     Smokeless tobacco: Never Used     Tobacco comment: quit with pregnancy -2018   Substance Use Topics     Alcohol use: Yes     Comment: rare- quit with pregnancy     Family History   Problem Relation Age of Onset     Allergies Mother         food allergies as well as seasonal and bees     Asthma Mother      Hypertension Father      C.A.D. Father      Gastrointestinal Disease Father         IBS- ulcer     Hypertension Paternal Grandmother      Arthritis Paternal Grandmother      Cerebrovascular Disease Paternal Grandmother      Cancer Paternal Grandfather         bladder cancer     Alzheimer Disease Paternal Grandfather      Tuberculosis Maternal Grandfather      Diabetes No family hx of      Breast Cancer No family hx of      Cancer - colorectal No family hx of          BP Readings from Last 3 Encounters:   19 116/78   19 139/84   18 123/84    Wt Readings from Last 3 Encounters:   19 67.2 kg (148 lb 3.2 oz)   19 66.6 kg (146 lb 12.8 oz)   18 61.3 kg (135 lb 3.2 oz)                    ROS:  Other than noted above, general, HEENT, respiratory, cardiac, MS, and gastrointestinal systems are negative.     OBJECTIVE:                                                    /78 (BP Location: Right arm, Patient Position: Sitting, Cuff Size: Adult Regular)   Pulse 93   Temp 98  F (36.7  C) (Tympanic)   Ht 1.626 m (5' 4\")   Wt 67.2 kg (148 lb 3.2 oz)   BMI 25.44 kg/m   Body mass index is 25.44 kg/m .   GENERAL: healthy, alert, well nourished, well hydrated, no distress  HENT: ear canals- normal; TMs- normal; Nose- normal; Mouth- no ulcers, no lesions  NECK: no tenderness, no adenopathy, no asymmetry, no masses, no stiffness; thyroid- normal to palpation  RESP: lungs clear to auscultation - no rales, no rhonchi, no wheezes  CV: regular rates and rhythm, normal S1 S2, no S3 or S4 and no murmur, no click or rub " -  ABDOMEN: soft, no tenderness, no  hepatosplenomegaly, no masses, normal bowel sounds  MS: extremities- no gross deformities noted, no edema  PSYCH: Alert and oriented times 3; speech- coherent , normal rate and volume; able to articulate logical thoughts, able to abstract reason, no tangential thoughts, no hallucinations or delusions, affect- normal       ASSESSMENT/PLAN:                                                      ASSESSMENT/PLAN:      ICD-10-CM    1. Fatigue, unspecified type R53.83 CBC with platelets differential     Comprehensive metabolic panel     Ferritin     TSH with free T4 reflex     Vitamin D Deficiency   2. MARTHA positive R76.8 RHEUMATOLOGY REFERRAL       Patient Instructions     Try the hydroxyzine at night  mg  Let me know next week if this works  Can try the magnesium again at night. Stop if diarrhea  Consider sleep specialist or sleep therapist/psychologist    BooK; say stefanie to insomnia  Sleep meditation podcasts, CBT-I lele, Isleep lele, Insight timer lele    Good Sleep Hygiene: handout   25 minutes was spent face to face with the patient today discussing history, diagnosis, and follow-up plan as noted above. Over 50% of the visit was spent in counseling and coordination of care. Total Visit Time: 25 minutes.   Rosario Simeon PA-C   [facility]

## 2019-06-15 ASSESSMENT — ANXIETY QUESTIONNAIRES: GAD7 TOTAL SCORE: 7

## 2019-06-16 LAB — DEPRECATED CALCIDIOL+CALCIFEROL SERPL-MC: 22 UG/L (ref 20–75)

## 2019-06-19 ENCOUNTER — MYC MEDICAL ADVICE (OUTPATIENT)
Dept: FAMILY MEDICINE | Facility: CLINIC | Age: 34
End: 2019-06-19

## 2019-06-19 DIAGNOSIS — M16.10 HIP ARTHRITIS: ICD-10-CM

## 2019-06-19 DIAGNOSIS — F51.02 ADJUSTMENT INSOMNIA: Primary | ICD-10-CM

## 2019-06-19 RX ORDER — HYDROXYZINE HYDROCHLORIDE 50 MG/1
75-100 TABLET, FILM COATED ORAL
Qty: 60 TABLET | Refills: 1 | Status: SHIPPED | OUTPATIENT
Start: 2019-06-19 | End: 2021-03-26

## 2019-07-02 RX ORDER — PREDNISONE 20 MG/1
20 TABLET ORAL 2 TIMES DAILY
Qty: 10 TABLET | Refills: 0 | Status: SHIPPED | OUTPATIENT
Start: 2019-07-02 | End: 2021-03-26

## 2019-08-27 DIAGNOSIS — J45.20 MILD INTERMITTENT ASTHMA WITHOUT COMPLICATION: ICD-10-CM

## 2019-08-27 RX ORDER — ALBUTEROL SULFATE 90 UG/1
AEROSOL, METERED RESPIRATORY (INHALATION)
Qty: 1 INHALER | Refills: 0 | Status: SHIPPED | OUTPATIENT
Start: 2019-08-27 | End: 2019-10-08

## 2019-10-08 DIAGNOSIS — J45.20 MILD INTERMITTENT ASTHMA WITHOUT COMPLICATION: ICD-10-CM

## 2019-10-08 RX ORDER — ALBUTEROL SULFATE 90 UG/1
AEROSOL, METERED RESPIRATORY (INHALATION)
Qty: 1 INHALER | Refills: 3 | Status: SHIPPED | OUTPATIENT
Start: 2019-10-08 | End: 2020-03-02

## 2019-10-08 NOTE — TELEPHONE ENCOUNTER
Prescription approved per [facility] Refill Protocol.  Omar King RN

## 2019-10-08 NOTE — TELEPHONE ENCOUNTER
albuterol      Last Written Prescription Date:  8/27/19  Last Fill Quantity: 1,   # refills: 0  Last Office Visit: 6/14/19  Future Office visit:       Routing refill request to provider for review/approval because:  Drug not on the [facility], P or [facility] refill protocol or controlled substance

## 2019-11-03 ENCOUNTER — HEALTH MAINTENANCE LETTER (OUTPATIENT)
Age: 34
End: 2019-11-03

## 2019-11-11 ENCOUNTER — E-VISIT (OUTPATIENT)
Dept: FAMILY MEDICINE | Facility: CLINIC | Age: 34
End: 2019-11-11
Payer: COMMERCIAL

## 2019-11-11 ENCOUNTER — MYC MEDICAL ADVICE (OUTPATIENT)
Dept: FAMILY MEDICINE | Facility: CLINIC | Age: 34
End: 2019-11-11

## 2019-11-11 DIAGNOSIS — Z30.011 OCP (ORAL CONTRACEPTIVE PILLS) INITIATION: Primary | ICD-10-CM

## 2019-11-11 PROCEDURE — 99444 ZZC PHYSICIAN ONLINE EVALUATION & MANAGEMENT SERVICE: CPT | Performed by: PHYSICIAN ASSISTANT

## 2019-11-13 RX ORDER — NORGESTIMATE AND ETHINYL ESTRADIOL 0.25-0.035
1 KIT ORAL DAILY
Qty: 90 TABLET | Refills: 3 | Status: SHIPPED | OUTPATIENT
Start: 2019-11-13 | End: 2021-03-26

## 2020-02-29 DIAGNOSIS — J45.20 MILD INTERMITTENT ASTHMA WITHOUT COMPLICATION: ICD-10-CM

## 2020-03-02 RX ORDER — ALBUTEROL SULFATE 90 UG/1
AEROSOL, METERED RESPIRATORY (INHALATION)
Qty: 18 INHALER | Refills: 3 | Status: SHIPPED | OUTPATIENT
Start: 2020-03-02 | End: 2020-03-05

## 2020-03-02 NOTE — TELEPHONE ENCOUNTER
"VENTOLIN HFA 90 MCG INHALER      Last Written Prescription Date:  10/8/19  Last Fill Quantity: 1,   # refills: 3  Last Office Visit: 6/14/19  Future Office visit:       Requested Prescriptions   Pending Prescriptions Disp Refills     albuterol (VENTOLIN HFA) 108 (90 Base) MCG/ACT inhaler [Pharmacy Med Name: VENTOLIN HFA 90 MCG INHALER] 18 Inhaler 3     Sig: INHALE 2 PUFFS EVERY 4-6 HOURS AS NEEDED FOR SHORTNESS OF BREATH       Asthma Maintenance Inhalers - Anticholinergics Failed - 2/29/2020 11:48 AM        Failed - Asthma control assessment score within normal limits in last 6 months     Please review ACT score.           Failed - Recent (6 mo) or future (30 days) visit within the authorizing provider's specialty     Patient had office visit in the last 6 months or has a visit in the next 30 days with authorizing provider or within the authorizing provider's specialty.  See \"Patient Info\" tab in inbasket, or \"Choose Columns\" in Meds & Orders section of the refill encounter.            Passed - Patient is age 12 years or older        Passed - Medication is active on med list          "

## 2020-03-02 NOTE — TELEPHONE ENCOUNTER
Prescription approved per [facility] Refill Protocol.  Omar King RN

## 2020-03-05 ENCOUNTER — OFFICE VISIT (OUTPATIENT)
Dept: FAMILY MEDICINE | Facility: CLINIC | Age: 35
End: 2020-03-05
Payer: COMMERCIAL

## 2020-03-05 VITALS
DIASTOLIC BLOOD PRESSURE: 87 MMHG | SYSTOLIC BLOOD PRESSURE: 127 MMHG | BODY MASS INDEX: 24.65 KG/M2 | WEIGHT: 143.6 LBS | OXYGEN SATURATION: 97 % | TEMPERATURE: 100.7 F | HEART RATE: 123 BPM

## 2020-03-05 DIAGNOSIS — J06.9 VIRAL URI: Primary | ICD-10-CM

## 2020-03-05 DIAGNOSIS — J45.20 MILD INTERMITTENT ASTHMA WITHOUT COMPLICATION: ICD-10-CM

## 2020-03-05 PROCEDURE — 99214 OFFICE O/P EST MOD 30 MIN: CPT | Performed by: PHYSICIAN ASSISTANT

## 2020-03-05 RX ORDER — GUAIFENESIN/DEXTROMETHORPHAN 100-10MG/5
10 SYRUP ORAL 4 TIMES DAILY PRN
Qty: 473 ML | Refills: 0 | Status: SHIPPED | OUTPATIENT
Start: 2020-03-05 | End: 2021-03-26

## 2020-03-05 RX ORDER — FLUTICASONE PROPIONATE 50 MCG
1 SPRAY, SUSPENSION (ML) NASAL DAILY
Qty: 18.2 ML | Refills: 0 | Status: SHIPPED | OUTPATIENT
Start: 2020-03-05 | End: 2021-03-26

## 2020-03-05 RX ORDER — ALBUTEROL SULFATE 90 UG/1
AEROSOL, METERED RESPIRATORY (INHALATION)
Qty: 18 INHALER | Refills: 3 | Status: SHIPPED | OUTPATIENT
Start: 2020-03-05 | End: 2020-08-24

## 2020-03-05 ASSESSMENT — ENCOUNTER SYMPTOMS
FATIGUE: 1
VOMITING: 0
ABDOMINAL PAIN: 0
SHORTNESS OF BREATH: 0
CHILLS: 0
FEVER: 1
SORE THROAT: 0
SINUS PRESSURE: 1
COUGH: 1
NAUSEA: 0
RHINORRHEA: 1
NERVOUS/ANXIOUS: 0

## 2020-03-05 NOTE — PATIENT INSTRUCTIONS
Your symptoms are likely due to an upper respiratory virus.  There are no antibiotics to treat viruses.  The virus may be influenza, but you are outside the window for treatment with Tamiflu.  Given sinus congestion and cough, you have been prescribed Robitussin-DM.  You have also been prescribed Flonase to help decrease nasal congestion.  Your albuterol inhaler for asthma has been refilled today.  Please return to clinic if her symptoms are worsening, or are not improving over the next week.    Patient Education     Viral Upper Respiratory Illness (Adult)    You have a viral upper respiratory illness (URI), which is another term for the common cold. This illness is contagious during the first few days. It is spread through the air by coughing and sneezing. It may also be spread by direct contact (touching the sick person and then touching your own eyes, nose, or mouth). Frequent handwashing will decrease risk of spread. Most viral illnesses go away within 7 to 10 days with rest and simple home remedies. Sometimes the illness may last for several weeks. Antibiotics will not kill a virus, and they are generally not prescribed for this condition.  Home care    If symptoms are severe, rest at home for the first 2 to 3 days. When you resume activity, don't let yourself get too tired.    Don't smoke. If you need help stopping, talk with your healthcare provider.    Avoid being exposed to cigarette smoke (yours or others ).    You may use acetaminophen or ibuprofen to control pain and fever, unless another medicine was prescribed. If you have chronic liver or kidney disease, have ever had a stomach ulcer or gastrointestinal bleeding, or are taking blood-thinning medicines, talk with your healthcare provider before using these medicines. Aspirin should never be given to anyone under 18 years of age who is ill with a viral infection or fever. It may cause severe liver or brain damage.    Your appetite may be poor, so a  light diet is fine. Stay well hydrated by drinking 6 to 8 glasses of fluids per day (water, soft drinks, juices, tea, or soup). Extra fluids will help loosen secretions in the nose and lungs.    Over-the-counter cold medicines will not shorten the length of time you re sick, but they may be helpful for the following symptoms: cough, sore throat, and nasal and sinus congestion. If you take prescription medicines, ask your healthcare provider or pharmacist which over-the-counter medicines are safe to use. (Note: Don't use decongestants if you have high blood pressure.)  Follow-up care  Follow up with your healthcare provider, or as advised.  When to seek medical advice  Call your healthcare provider right away if any of these occur:    Cough with lots of colored sputum (mucus)    Severe headache; face, neck, or ear pain    Difficulty swallowing due to throat pain    Fever of 100.4 F (38 C) or higher, or as directed by your healthcare provider  Call 911  Call 911 if any of these occur:    Chest pain, shortness of breath, wheezing, or difficulty breathing    Coughing up blood    Very severe pain with swallowing, especially if it goes along with a muffled voice   Date Last Reviewed: 6/1/2018 2000-2019 [employer]. [address] [place] [address], [place] [address]. All rights reserved. This information is not intended as a substitute for professional medical care. Always follow your healthcare professional's instructions.

## 2020-03-05 NOTE — LETTER
March 5, 2020      Katerin Jasso  [address] [facility] [id number]        To Whom It May Concern:    Katerin Jasso was seen in our clinic. She may return to work without restrictions once fever is gone for 24 hours without treatment.      Sincerely,        Cat Miranda PA-C

## 2020-03-05 NOTE — PROGRESS NOTES
Subjective     Katerin Jasso is a 34 year old female who presents to clinic today for the following health issues:    HPI   Chief Complaint   Patient presents with     URI     fever x4 days, nasal conestion and drainage,      Patient notes 4 days of intermittent fever, nasal congestion, cough, sinus pain, and chest tightness. Patient denies sore throat, rash, dyspnea, chest pain. Symptoms treated with Ibuprofen. Daughter had similar symptoms last week and son currently has the same symptoms.      Patient Active Problem List   Diagnosis     Allergic rhinitis     Post viral asthma     GERD (gastroesophageal reflux disease)     CARDIOVASCULAR SCREENING; LDL GOAL LESS THAN 160     Mild intermittent asthma without complication     S/P  section     Past Surgical History:   Procedure Laterality Date      SECTION N/A 12/10/2015    Procedure:  SECTION;  Surgeon: Norma Sharma MD;  Location: [facility]      SECTION, TUBAL LIGATION, COMBINED N/A 2018    Procedure: COMBINED  SECTION, TUBAL LIGATION;   Section and Tubal Sterilization;  Surgeon: Nikita Walton MD;  Location: [facility]     MOUTH SURGERY      wisdom teeth       Social History     Tobacco Use     Smoking status: Former Smoker     Packs/day: 0.50     Types: Cigarettes     Last attempt to quit: 2018     Years since quittin.1     Smokeless tobacco: Never Used     Tobacco comment: quit with pregnancy -   Substance Use Topics     Alcohol use: Yes     Comment: rare- quit with pregnancy     Family History   Problem Relation Age of Onset     Allergies Mother         food allergies as well as seasonal and bees     Asthma Mother      Hypertension Father      C.A.D. Father      Gastrointestinal Disease Father         IBS- ulcer     Hypertension Paternal Grandmother      Arthritis Paternal Grandmother      Cerebrovascular Disease Paternal Grandmother      Cancer Paternal Grandfather         bladder cancer      Alzheimer Disease Paternal Grandfather      Tuberculosis Maternal Grandfather      Diabetes No family hx of      Breast Cancer No family hx of      Cancer - colorectal No family hx of          Current Outpatient Medications   Medication Sig Dispense Refill     albuterol (2.5 MG/3ML) 0.083% neb solution Take 1 vial (2.5 mg) by nebulization every 6 hours as needed for shortness of breath / dyspnea or wheezing 60 vial 1     ibuprofen (ADVIL/MOTRIN) 200 MG tablet Take 3 tablets (600 mg) by mouth every 6 hours as needed for other (carmping)       albuterol (VENTOLIN HFA) 108 (90 Base) MCG/ACT inhaler INHALE 2 PUFFS EVERY 4-6 HOURS AS NEEDED FOR SHORTNESS OF BREATH (Patient not taking: Reported on 3/5/2020) 18 Inhaler 3     FLOVENT DISKUS 100 MCG/BLIST inhaler INHALE 1 PUFF INTO THE LUNGS 2 TIMES DAILY (Patient not taking: Reported on 6/14/2019) 1 Inhaler 0     hydrOXYzine (ATARAX) 50 MG tablet Take 1.5-2 tablets ( mg) by mouth nightly as needed for anxiety (Patient not taking: Reported on 3/5/2020) 60 tablet 1     norgestimate-ethinyl estradiol (ORTHO-CYCLEN/SPRINTEC) 0.25-35 MG-MCG tablet Take 1 tablet by mouth daily (Patient not taking: Reported on 3/5/2020) 90 tablet 3     predniSONE (DELTASONE) 20 MG tablet Take 1 tablet (20 mg) by mouth 2 times daily (Patient not taking: Reported on 3/5/2020) 10 tablet 0     Allergies   Allergen Reactions     Provera [Medroxyprogesterone] Hives     Cipro [Ciprofloxacin] Hives     Septra [Bactrim] Hives     Latex Swelling and Rash       Reviewed and updated as needed this visit by Provider         Review of Systems   Constitutional: Positive for fatigue and fever. Negative for chills.   HENT: Positive for congestion, rhinorrhea and sinus pressure. Negative for ear pain and sore throat.    Respiratory: Positive for cough. Negative for shortness of breath.    Cardiovascular: Negative for chest pain.   Gastrointestinal: Negative for abdominal pain, nausea and vomiting.   Skin:  Negative for rash.   Psychiatric/Behavioral: The patient is not nervous/anxious.             Objective    /87 (BP Location: Left arm, Cuff Size: Adult Regular)   Pulse 123   Temp 100.7  F (38.2  C) (Tympanic)   Wt 65.1 kg (143 lb 9.6 oz)   SpO2 97%   BMI 24.65 kg/m    Body mass index is 24.65 kg/m .  Physical Exam  Vitals signs and nursing note reviewed.   Constitutional:       General: She is not in acute distress.     Appearance: Normal appearance. She is not toxic-appearing.   HENT:      Head: Normocephalic and atraumatic.      Right Ear: Tympanic membrane and ear canal normal.      Left Ear: Tympanic membrane and ear canal normal.      Nose: Congestion present. No rhinorrhea.      Mouth/Throat:      Mouth: Mucous membranes are moist.      Pharynx: Oropharynx is clear. No oropharyngeal exudate or posterior oropharyngeal erythema.   Eyes:      Extraocular Movements: Extraocular movements intact.      Pupils: Pupils are equal, round, and reactive to light.   Neck:      Musculoskeletal: Normal range of motion.   Cardiovascular:      Rate and Rhythm: Normal rate and regular rhythm.      Heart sounds: Normal heart sounds.   Pulmonary:      Effort: Pulmonary effort is normal.      Breath sounds: Normal breath sounds. No wheezing, rhonchi or rales.   Musculoskeletal: Normal range of motion.   Skin:     General: Skin is warm and dry.   Neurological:      General: No focal deficit present.      Mental Status: She is alert.   Psychiatric:         Mood and Affect: Mood normal.         Behavior: Behavior normal.            Diagnostic Test Results:  Labs reviewed in Epic  none         Assessment & Plan     1. Viral URI  Symptoms likely due to viral URI.  Discussed influenza antigen test with patient who declines as they are outside of window for treatment with Tamiflu.  Low suspicion for pneumonia or asthma exacerbation given lungs clear to auscultation.  Discussed symptomatic treatment including Robitussin-DM and  Flonase. Albuterol inhaler refilled for patient's asthma.    - albuterol (VENTOLIN HFA) 108 (90 Base) MCG/ACT inhaler; INHALE 2 PUFFS EVERY 4-6 HOURS AS NEEDED FOR SHORTNESS OF BREATH  Dispense: 18 Inhaler; Refill: 3  - guaiFENesin-dextromethorphan (ROBITUSSIN DM) 100-10 MG/5ML syrup; Take 10 mLs by mouth 4 times daily as needed for cough or congestion  Dispense: 473 mL; Refill: 0  - fluticasone (FLONASE) 50 MCG/ACT nasal spray; Spray 1 spray into both nostrils daily  Dispense: 18.2 mL; Refill: 0    2. Mild intermittent asthma without complication  No signs of exacerbation today.  Albuterol refilled.  - albuterol (VENTOLIN HFA) 108 (90 Base) MCG/ACT inhaler; INHALE 2 PUFFS EVERY 4-6 HOURS AS NEEDED FOR SHORTNESS OF BREATH  Dispense: 18 Inhaler; Refill: 3         See Patient Instructions    Return in about 1 week (around 3/12/2020), or if symptoms worsen or fail to improve.    Cat Miranda PA-C  [facility]

## 2020-03-12 ENCOUNTER — OFFICE VISIT (OUTPATIENT)
Dept: FAMILY MEDICINE | Facility: CLINIC | Age: 35
End: 2020-03-12
Payer: COMMERCIAL

## 2020-03-12 ENCOUNTER — ANCILLARY PROCEDURE (OUTPATIENT)
Dept: GENERAL RADIOLOGY | Facility: CLINIC | Age: 35
End: 2020-03-12
Attending: PHYSICIAN ASSISTANT
Payer: COMMERCIAL

## 2020-03-12 VITALS
HEART RATE: 133 BPM | DIASTOLIC BLOOD PRESSURE: 75 MMHG | SYSTOLIC BLOOD PRESSURE: 118 MMHG | WEIGHT: 141.8 LBS | OXYGEN SATURATION: 96 % | BODY MASS INDEX: 24.34 KG/M2 | TEMPERATURE: 98.9 F

## 2020-03-12 DIAGNOSIS — J06.9 VIRAL URI: Primary | ICD-10-CM

## 2020-03-12 DIAGNOSIS — J06.9 VIRAL URI: ICD-10-CM

## 2020-03-12 DIAGNOSIS — J45.998 POST VIRAL ASTHMA: ICD-10-CM

## 2020-03-12 LAB
C PNEUM DNA SPEC QL NAA+PROBE: NOT DETECTED
FLUAV H1 2009 PAND RNA SPEC QL NAA+PROBE: NOT DETECTED
FLUAV H1 RNA SPEC QL NAA+PROBE: NOT DETECTED
FLUAV H3 RNA SPEC QL NAA+PROBE: NOT DETECTED
FLUAV RNA SPEC QL NAA+PROBE: NOT DETECTED
FLUAV+FLUBV AG SPEC QL: NEGATIVE
FLUAV+FLUBV AG SPEC QL: NEGATIVE
FLUBV RNA SPEC QL NAA+PROBE: NOT DETECTED
HADV DNA SPEC QL NAA+PROBE: NOT DETECTED
HCOV PNL SPEC NAA+PROBE: NOT DETECTED
HMPV RNA SPEC QL NAA+PROBE: NOT DETECTED
HPIV1 RNA SPEC QL NAA+PROBE: NOT DETECTED
HPIV2 RNA SPEC QL NAA+PROBE: NOT DETECTED
HPIV3 RNA SPEC QL NAA+PROBE: NOT DETECTED
HPIV4 RNA SPEC QL NAA+PROBE: NOT DETECTED
M PNEUMO DNA SPEC QL NAA+PROBE: NOT DETECTED
MICROBIOLOGIST REVIEW: ABNORMAL
RSV RNA SPEC QL NAA+PROBE: NOT DETECTED
RSV RNA SPEC QL NAA+PROBE: NOT DETECTED
RV+EV RNA SPEC QL NAA+PROBE: ABNORMAL
SPECIMEN SOURCE: NORMAL

## 2020-03-12 PROCEDURE — 87486 CHLMYD PNEUM DNA AMP PROBE: CPT | Performed by: PHYSICIAN ASSISTANT

## 2020-03-12 PROCEDURE — 87804 INFLUENZA ASSAY W/OPTIC: CPT | Mod: 59 | Performed by: PHYSICIAN ASSISTANT

## 2020-03-12 PROCEDURE — 71046 X-RAY EXAM CHEST 2 VIEWS: CPT | Mod: FY

## 2020-03-12 PROCEDURE — 87633 RESP VIRUS 12-25 TARGETS: CPT | Performed by: PHYSICIAN ASSISTANT

## 2020-03-12 PROCEDURE — 99213 OFFICE O/P EST LOW 20 MIN: CPT | Performed by: PHYSICIAN ASSISTANT

## 2020-03-12 PROCEDURE — 87581 M.PNEUMON DNA AMP PROBE: CPT | Performed by: PHYSICIAN ASSISTANT

## 2020-03-12 RX ORDER — PREDNISONE 20 MG/1
40 TABLET ORAL DAILY
Qty: 10 TABLET | Refills: 0 | Status: SHIPPED | OUTPATIENT
Start: 2020-03-12 | End: 2020-03-17

## 2020-03-12 ASSESSMENT — ENCOUNTER SYMPTOMS
LIGHT-HEADEDNESS: 0
SHORTNESS OF BREATH: 1
FATIGUE: 1
ABDOMINAL PAIN: 0
NAUSEA: 0
WHEEZING: 1
NERVOUS/ANXIOUS: 0
FEVER: 0
COUGH: 1
RHINORRHEA: 1
VOMITING: 0
HEADACHES: 0
SORE THROAT: 0

## 2020-03-12 NOTE — PROGRESS NOTES
Subjective     Katerin Jasso is a 34 year old female who presents to clinic today for the following health issues:    HPI   Chief Complaint   Patient presents with     URI     102.6 temp this moring, fever on/off x2 weeks, chest congestion, cough, wheeze and shortness of breath     Patient notes that fever has been intermittent for the last few weeks. She notes chest congestion, cough, and wheezing ongoing. She notes mild mild dyspnea and chest pain with cough. In the past she has required oral steroids given underlying asthma. Patient states she has been treating symptoms with nebulizer twice daily (last used right before appointment)  and Ibuprofen. Patient has not traveled and has no known contacts with COVID-19.       Patient Active Problem List   Diagnosis     Allergic rhinitis     Post viral asthma     GERD (gastroesophageal reflux disease)     CARDIOVASCULAR SCREENING; LDL GOAL LESS THAN 160     Mild intermittent asthma without complication     S/P  section     Past Surgical History:   Procedure Laterality Date      SECTION N/A 12/10/2015    Procedure:  SECTION;  Surgeon: Norma Sharma MD;  Location: [facility]      SECTION, TUBAL LIGATION, COMBINED N/A 2018    Procedure: COMBINED  SECTION, TUBAL LIGATION;   Section and Tubal Sterilization;  Surgeon: Nikita Walton MD;  Location: [facility]     MOUTH SURGERY      wisdom teeth       Social History     Tobacco Use     Smoking status: Former Smoker     Packs/day: 0.50     Types: Cigarettes     Last attempt to quit: 2018     Years since quittin.1     Smokeless tobacco: Never Used     Tobacco comment: quit with pregnancy -   Substance Use Topics     Alcohol use: Yes     Comment: rare- quit with pregnancy     Family History   Problem Relation Age of Onset     Allergies Mother         food allergies as well as seasonal and bees     Asthma Mother      Hypertension Father      C.A.D. Father       Gastrointestinal Disease Father         IBS- ulcer     Hypertension Paternal Grandmother      Arthritis Paternal Grandmother      Cerebrovascular Disease Paternal Grandmother      Cancer Paternal Grandfather         bladder cancer     Alzheimer Disease Paternal Grandfather      Tuberculosis Maternal Grandfather      Diabetes No family hx of      Breast Cancer No family hx of      Cancer - colorectal No family hx of          Current Outpatient Medications   Medication Sig Dispense Refill     albuterol (2.5 MG/3ML) 0.083% neb solution Take 1 vial (2.5 mg) by nebulization every 6 hours as needed for shortness of breath / dyspnea or wheezing 60 vial 1     albuterol (VENTOLIN HFA) 108 (90 Base) MCG/ACT inhaler INHALE 2 PUFFS EVERY 4-6 HOURS AS NEEDED FOR SHORTNESS OF BREATH 18 Inhaler 3     ibuprofen (ADVIL/MOTRIN) 200 MG tablet Take 3 tablets (600 mg) by mouth every 6 hours as needed for other (carmping)       FLOVENT DISKUS 100 MCG/BLIST inhaler INHALE 1 PUFF INTO THE LUNGS 2 TIMES DAILY (Patient not taking: Reported on 6/14/2019) 1 Inhaler 0     fluticasone (FLONASE) 50 MCG/ACT nasal spray Spray 1 spray into both nostrils daily (Patient not taking: Reported on 3/12/2020) 18.2 mL 0     guaiFENesin-dextromethorphan (ROBITUSSIN DM) 100-10 MG/5ML syrup Take 10 mLs by mouth 4 times daily as needed for cough or congestion (Patient not taking: Reported on 3/12/2020) 473 mL 0     hydrOXYzine (ATARAX) 50 MG tablet Take 1.5-2 tablets ( mg) by mouth nightly as needed for anxiety (Patient not taking: Reported on 3/5/2020) 60 tablet 1     norgestimate-ethinyl estradiol (ORTHO-CYCLEN/SPRINTEC) 0.25-35 MG-MCG tablet Take 1 tablet by mouth daily (Patient not taking: Reported on 3/5/2020) 90 tablet 3     predniSONE (DELTASONE) 20 MG tablet Take 1 tablet (20 mg) by mouth 2 times daily (Patient not taking: Reported on 3/5/2020) 10 tablet 0     Allergies   Allergen Reactions     Provera [Medroxyprogesterone] Hives     Cipro  [Ciprofloxacin] Hives     Septra [Bactrim] Hives     Latex Swelling and Rash       Reviewed and updated as needed this visit by Provider         Review of Systems   Constitutional: Positive for fatigue. Negative for fever.   HENT: Positive for congestion and rhinorrhea. Negative for sore throat.    Respiratory: Positive for cough, shortness of breath (mild) and wheezing.    Cardiovascular: Positive for chest pain (with cough).   Gastrointestinal: Negative for abdominal pain, nausea and vomiting.   Skin: Negative for rash.   Neurological: Negative for light-headedness and headaches.   Psychiatric/Behavioral: The patient is not nervous/anxious.             Objective    /75 (BP Location: Right arm, Cuff Size: Adult Regular)   Pulse 133   Temp 98.9  F (37.2  C) (Tympanic)   Wt 64.3 kg (141 lb 12.8 oz)   SpO2 96%   BMI 24.34 kg/m    Body mass index is 24.34 kg/m .  Physical Exam  Vitals signs and nursing note reviewed.   Constitutional:       General: She is not in acute distress.     Appearance: Normal appearance. She is not toxic-appearing.   HENT:      Head: Normocephalic and atraumatic.      Right Ear: Tympanic membrane and ear canal normal.      Left Ear: Tympanic membrane and ear canal normal.      Nose: Congestion present. No rhinorrhea.      Mouth/Throat:      Mouth: Mucous membranes are moist.      Pharynx: Oropharynx is clear. No oropharyngeal exudate or posterior oropharyngeal erythema.   Eyes:      Extraocular Movements: Extraocular movements intact.      Pupils: Pupils are equal, round, and reactive to light.   Neck:      Musculoskeletal: Normal range of motion.   Cardiovascular:      Rate and Rhythm: Normal rate and regular rhythm.      Heart sounds: Normal heart sounds.   Pulmonary:      Effort: Pulmonary effort is normal. No respiratory distress.      Breath sounds: Stridor present. Wheezing (diffuse ) present. No rhonchi or rales.   Musculoskeletal: Normal range of motion.   Lymphadenopathy:       Cervical: No cervical adenopathy.   Skin:     General: Skin is warm and dry.   Neurological:      General: No focal deficit present.      Mental Status: She is alert.   Psychiatric:         Mood and Affect: Mood normal.         Behavior: Behavior normal.          Diagnostic Test Results:  Labs reviewed in Epic  Influenza-Negative   CXR -Per my interpretation, no signs of consolidation or infiltrate.        Assessment & Plan     1. Viral URI  2. Post viral asthma  Influenza antigen negative.  Chest x-ray negative for pneumonia.  Respiratory panel PCR pending.  Patient symptoms are likely due to viral URI.  Low suspicion for COVID-19 as patient denies sick contacts, recent travel, and patient's young children had similar symptoms recently.  Patient does have known asthma and states asthma symptoms worsen in the setting of URIs.  Patient does have wheezing today on exam.  Recommended increasing nebulizer use to every 4 hours as needed.  Patient also prescribed short burst of prednisone.  Discussed return precautions and symptoms that would warrant emergent follow-up.  - XR Chest 2 Views; Future  - Influenza A/B antigen  - Respiratory Panel PCR - NP Swab  - predniSONE (DELTASONE) 20 MG tablet; Take 2 tablets (40 mg) by mouth daily for 5 days  Dispense: 10 tablet; Refill: 0       See Patient Instructions    Return in about 1 week (around 3/19/2020), or if symptoms worsen or fail to improve.    Cat Miranda PA-C  [facility]

## 2020-03-12 NOTE — PATIENT INSTRUCTIONS
Your flu test is negative.  Your x-ray does not show signs of pneumonia.  Your symptoms are likely due to a virus. We are running a viral test at this time to determine which virus is causing your symptoms.  It is important to get plenty of rest and fluids.  Please continue to use your nebulizer but increase it to every 4 hours.  You have been prescribed prednisone to help with wheezing and inflammation within your lungs.  For fever and body aches please use Tylenol/ibuprofen.  If your symptoms worsen, you are lightheaded, having trouble breathing, having chest pain, it is very important that you go to the emergency room.  To avoid spreading your virus, please stay home and avoid direct contact with others.  Do not return to work until your fevers are gone for 24 hours without treatment.    Patient Education     Viral Upper Respiratory Illness (Adult)    You have a viral upper respiratory illness (URI), which is another term for the common cold. This illness is contagious during the first few days. It is spread through the air by coughing and sneezing. It may also be spread by direct contact (touching the sick person and then touching your own eyes, nose, or mouth). Frequent handwashing will decrease risk of spread. Most viral illnesses go away within 7 to 10 days with rest and simple home remedies. Sometimes the illness may last for several weeks. Antibiotics will not kill a virus, and they are generally not prescribed for this condition.  Home care    If symptoms are severe, rest at home for the first 2 to 3 days. When you resume activity, don't let yourself get too tired.    Don't smoke. If you need help stopping, talk with your healthcare provider.    Avoid being exposed to cigarette smoke (yours or others ).    You may use acetaminophen or ibuprofen to control pain and fever, unless another medicine was prescribed. If you have chronic liver or kidney disease, have ever had a stomach ulcer or gastrointestinal  bleeding, or are taking blood-thinning medicines, talk with your healthcare provider before using these medicines. Aspirin should never be given to anyone under 18 years of age who is ill with a viral infection or fever. It may cause severe liver or brain damage.    Your appetite may be poor, so a light diet is fine. Stay well hydrated by drinking 6 to 8 glasses of fluids per day (water, soft drinks, juices, tea, or soup). Extra fluids will help loosen secretions in the nose and lungs.    Over-the-counter cold medicines will not shorten the length of time you re sick, but they may be helpful for the following symptoms: cough, sore throat, and nasal and sinus congestion. If you take prescription medicines, ask your healthcare provider or pharmacist which over-the-counter medicines are safe to use. (Note: Don't use decongestants if you have high blood pressure.)  Follow-up care  Follow up with your healthcare provider, or as advised.  When to seek medical advice  Call your healthcare provider right away if any of these occur:    Cough with lots of colored sputum (mucus)    Severe headache; face, neck, or ear pain    Difficulty swallowing due to throat pain    Fever of 100.4 F (38 C) or higher, or as directed by your healthcare provider  Call 911  Call 911 if any of these occur:    Chest pain, shortness of breath, wheezing, or difficulty breathing    Coughing up blood    Very severe pain with swallowing, especially if it goes along with a muffled voice   Date Last Reviewed: 6/1/2018 2000-2019 [employer]. [address] [place] [address], [place] [address]. All rights reserved. This information is not intended as a substitute for professional medical care. Always follow your healthcare professional's instructions.

## 2020-03-17 ENCOUNTER — MYC MEDICAL ADVICE (OUTPATIENT)
Dept: FAMILY MEDICINE | Facility: CLINIC | Age: 35
End: 2020-03-17

## 2020-03-18 ENCOUNTER — OFFICE VISIT (OUTPATIENT)
Dept: FAMILY MEDICINE | Facility: CLINIC | Age: 35
End: 2020-03-18
Payer: COMMERCIAL

## 2020-03-18 VITALS
WEIGHT: 147 LBS | SYSTOLIC BLOOD PRESSURE: 131 MMHG | HEIGHT: 64 IN | RESPIRATION RATE: 14 BRPM | DIASTOLIC BLOOD PRESSURE: 82 MMHG | HEART RATE: 136 BPM | TEMPERATURE: 102.1 F | BODY MASS INDEX: 25.1 KG/M2 | OXYGEN SATURATION: 96 %

## 2020-03-18 DIAGNOSIS — J18.9 PNEUMONIA DUE TO INFECTIOUS ORGANISM, UNSPECIFIED LATERALITY, UNSPECIFIED PART OF LUNG: ICD-10-CM

## 2020-03-18 DIAGNOSIS — R06.2 WHEEZING: ICD-10-CM

## 2020-03-18 DIAGNOSIS — J06.9 VIRAL URI: Primary | ICD-10-CM

## 2020-03-18 LAB
DEPRECATED S PYO AG THROAT QL EIA: NEGATIVE
SPECIMEN SOURCE: NORMAL
SPECIMEN SOURCE: NORMAL
STREP GROUP A PCR: NOT DETECTED

## 2020-03-18 PROCEDURE — 99213 OFFICE O/P EST LOW 20 MIN: CPT | Performed by: PHYSICIAN ASSISTANT

## 2020-03-18 PROCEDURE — 40001204 ZZHCL STATISTIC STREP A RAPID: Performed by: PHYSICIAN ASSISTANT

## 2020-03-18 PROCEDURE — 87651 STREP A DNA AMP PROBE: CPT | Performed by: PHYSICIAN ASSISTANT

## 2020-03-18 RX ORDER — METHYLPREDNISOLONE 4 MG
TABLET, DOSE PACK ORAL
Qty: 21 TABLET | Refills: 0 | Status: SHIPPED | OUTPATIENT
Start: 2020-03-18 | End: 2021-03-26

## 2020-03-18 RX ORDER — METHYLPREDNISOLONE 4 MG
TABLET, DOSE PACK ORAL
Qty: 21 TABLET | Refills: 0 | Status: SHIPPED | OUTPATIENT
Start: 2020-03-18 | End: 2020-03-18

## 2020-03-18 ASSESSMENT — ENCOUNTER SYMPTOMS
NAUSEA: 0
CHEST TIGHTNESS: 1
LIGHT-HEADEDNESS: 0
RHINORRHEA: 1
WHEEZING: 1
VOMITING: 0
CHILLS: 0
SHORTNESS OF BREATH: 1
HEADACHES: 0
COUGH: 1
SORE THROAT: 1
ABDOMINAL PAIN: 0
NERVOUS/ANXIOUS: 0
FEVER: 0

## 2020-03-18 ASSESSMENT — PAIN SCALES - GENERAL: PAINLEVEL: NO PAIN (0)

## 2020-03-18 ASSESSMENT — MIFFLIN-ST. JEOR: SCORE: 1351.79

## 2020-03-18 NOTE — LETTER
March 18, 2020      Katerin Jasso  [address] [facility] [id number]        To Whom It May Concern:    Katerin Jasso was seen in our clinic. She may return to work without restrictions 3/25/20.      Sincerely,        Cat Miranda PA-C

## 2020-03-18 NOTE — PROGRESS NOTES
Subjective     Katerin Jasso is a 34 year old female who presents to clinic today for the following health issues:    Per chart review, patient has been sick for 17 days with URI symptoms.  Lab work negative for influenza, but is positive for rhinovirus/enterovirus.  Patient has underlying asthma and symptoms are most consistent with asthma exacerbation in the setting of rhinovirus.  Patient treating symptoms with ibuprofen, Mucinex, DayQuil, albuterol, Flonase, and prescribed steroids.    Per patient symptoms have worsened and now include sore throat. Patient admits to fever, body aches, cough with production, wheezing, dyspnea. Patient last used nebulizer at 8:30am this morning.  Patient denies chest pain and hemoptysis.  Patient notes that in the past she has required antibiotics as well as longer course of steroids for similar asthma exacerbations with respiratory infection.  No other household members have the same symptoms and no recent travel.  Patient does not have history of DVT/PE.    HPI   ENT Symptoms RECHECK             Symptoms: cc Present Absent Comment   Fever/Chills  x  102.1    Fatigue  x     Muscle Aches  x     Eye Irritation   x    Sneezing   x    Nasal Kenan/Drg  x     Sinus Pressure/Pain  x     Loss of smell   x    Dental pain   x    Sore Throat  x     Swollen Glands  x     Ear Pain/Fullness  x     Cough  x     Wheeze  x     Chest Pain  x     Shortness of breath  x     Rash   x    Other   x      Symptom duration:  3 weeks   Symptom severity:  severe   Treatments tried:  ibuprofen, mucin ex, dayquil   Contacts:  son was sick but is better         Patient Active Problem List   Diagnosis     Allergic rhinitis     Post viral asthma     GERD (gastroesophageal reflux disease)     CARDIOVASCULAR SCREENING; LDL GOAL LESS THAN 160     Mild intermittent asthma without complication     S/P  section     Past Surgical History:   Procedure Laterality Date      SECTION N/A 12/10/2015     Procedure:  SECTION;  Surgeon: Norma Sharma MD;  Location: [facility]      SECTION, TUBAL LIGATION, COMBINED N/A 2018    Procedure: COMBINED  SECTION, TUBAL LIGATION;   Section and Tubal Sterilization;  Surgeon: Nikita Walton MD;  Location: [facility]     MOUTH SURGERY      wisdom teeth       Social History     Tobacco Use     Smoking status: Former Smoker     Packs/day: 0.50     Types: Cigarettes     Last attempt to quit: 2018     Years since quittin.2     Smokeless tobacco: Never Used     Tobacco comment: quit with pregnancy -2018   Substance Use Topics     Alcohol use: Yes     Comment: rare- quit with pregnancy     Family History   Problem Relation Age of Onset     Allergies Mother         food allergies as well as seasonal and bees     Asthma Mother      Hypertension Father      C.A.D. Father      Gastrointestinal Disease Father         IBS- ulcer     Hypertension Paternal Grandmother      Arthritis Paternal Grandmother      Cerebrovascular Disease Paternal Grandmother      Cancer Paternal Grandfather         bladder cancer     Alzheimer Disease Paternal Grandfather      Tuberculosis Maternal Grandfather      Diabetes No family hx of      Breast Cancer No family hx of      Cancer - colorectal No family hx of          Current Outpatient Medications   Medication Sig Dispense Refill     albuterol (2.5 MG/3ML) 0.083% neb solution Take 1 vial (2.5 mg) by nebulization every 6 hours as needed for shortness of breath / dyspnea or wheezing 60 vial 1     albuterol (VENTOLIN HFA) 108 (90 Base) MCG/ACT inhaler INHALE 2 PUFFS EVERY 4-6 HOURS AS NEEDED FOR SHORTNESS OF BREATH 18 Inhaler 3     ibuprofen (ADVIL/MOTRIN) 200 MG tablet Take 3 tablets (600 mg) by mouth every 6 hours as needed for other (carmping)       FLOVENT DISKUS 100 MCG/BLIST inhaler INHALE 1 PUFF INTO THE LUNGS 2 TIMES DAILY (Patient not taking: Reported on 2019) 1 Inhaler 0     fluticasone (FLONASE) 50  "MCG/ACT nasal spray Spray 1 spray into both nostrils daily (Patient not taking: Reported on 3/12/2020) 18.2 mL 0     guaiFENesin-dextromethorphan (ROBITUSSIN DM) 100-10 MG/5ML syrup Take 10 mLs by mouth 4 times daily as needed for cough or congestion (Patient not taking: Reported on 3/18/2020) 473 mL 0     hydrOXYzine (ATARAX) 50 MG tablet Take 1.5-2 tablets ( mg) by mouth nightly as needed for anxiety (Patient not taking: Reported on 3/5/2020) 60 tablet 1     norgestimate-ethinyl estradiol (ORTHO-CYCLEN/SPRINTEC) 0.25-35 MG-MCG tablet Take 1 tablet by mouth daily (Patient not taking: Reported on 3/5/2020) 90 tablet 3     predniSONE (DELTASONE) 20 MG tablet Take 1 tablet (20 mg) by mouth 2 times daily (Patient not taking: Reported on 3/5/2020) 10 tablet 0     Allergies   Allergen Reactions     Provera [Medroxyprogesterone] Hives     Cipro [Ciprofloxacin] Hives     Septra [Bactrim] Hives     Latex Swelling and Rash       Reviewed and updated as needed this visit by Provider         Review of Systems   Constitutional: Negative for chills and fever.   HENT: Positive for congestion, rhinorrhea and sore throat. Negative for ear pain.    Respiratory: Positive for cough, chest tightness, shortness of breath and wheezing.    Cardiovascular: Negative for chest pain.   Gastrointestinal: Negative for abdominal pain, nausea and vomiting.   Skin: Negative for rash.   Neurological: Negative for light-headedness and headaches.   Psychiatric/Behavioral: The patient is not nervous/anxious.             Objective    /82 (BP Location: Left arm, Patient Position: Chair, Cuff Size: Adult Regular)   Pulse 136   Temp 102.1  F (38.9  C) (Tympanic)   Resp 14   Ht 1.626 m (5' 4\")   Wt 66.7 kg (147 lb)   LMP 02/26/2020 (Approximate)   SpO2 96%   Breastfeeding No   BMI 25.23 kg/m    Body mass index is 25.23 kg/m .  Physical Exam  Vitals signs and nursing note reviewed.   Constitutional:       General: She is not in acute " distress.     Appearance: Normal appearance. She is ill-appearing.   HENT:      Head: Normocephalic and atraumatic.      Right Ear: Tympanic membrane and ear canal normal.      Left Ear: Tympanic membrane and ear canal normal.      Mouth/Throat:      Mouth: Mucous membranes are moist.      Pharynx: Oropharynx is clear. Posterior oropharyngeal erythema present. No oropharyngeal exudate.      Comments: No swelling  Eyes:      Extraocular Movements: Extraocular movements intact.      Pupils: Pupils are equal, round, and reactive to light.   Neck:      Musculoskeletal: Normal range of motion.   Cardiovascular:      Rate and Rhythm: Regular rhythm. Tachycardia present.      Heart sounds: Normal heart sounds.   Pulmonary:      Effort: Pulmonary effort is normal. No respiratory distress.      Breath sounds: Wheezing (Cleared with cough) present. No rhonchi or rales.      Comments: Can speak in full sentences  Musculoskeletal: Normal range of motion.   Lymphadenopathy:      Cervical: No cervical adenopathy.   Skin:     General: Skin is warm and dry.   Neurological:      General: No focal deficit present.      Mental Status: She is alert.   Psychiatric:         Mood and Affect: Mood normal.         Behavior: Behavior normal.            Diagnostic Test Results:  Labs reviewed in Epic  Results for orders placed or performed in visit on 03/18/20 (from the past 24 hour(s))   Streptococcus A Rapid Scr w Reflx to PCR    Specimen: Throat   Result Value Ref Range    Strep Specimen Description Throat     Streptococcus Group A Rapid Screen Negative NEG^Negative           Assessment & Plan     1. Viral URI  2. Wheezing  Rapid strep negative.  Patient's wheezing has significantly improved, but patient still notes difficulty breathing and some wheezing at home.  As she has needed treatment with longer course of steroids in the past, will place her on Medrol Dosepak at this time.  - Streptococcus A Rapid Scr w Reflx to PCR  - Group A  Streptococcus PCR Throat Swab  - methylPREDNISolone (MEDROL DOSEPAK) 4 MG tablet therapy pack; Follow Package Directions  Dispense: 21 tablet; Refill: 0    3. Pneumonia due to infectious organism, unspecified laterality, unspecified part of lung  Concerns for pneumonia given recent development of productive cough and increase in fever.  Will treat with Augmentin.  Recommended Tylenol/ibuprofen for fever and pain management.  Discussed symptoms that would warrant emergent follow-up and expected course.    - amoxicillin-clavulanate (AUGMENTIN) 875-125 MG tablet; Take 1 tablet by mouth 2 times daily  Dispense: 14 tablet; Refill: 0      See Patient Instructions    Return in about 1 week (around 3/25/2020), or if symptoms worsen or fail to improve.    JARROD ZavalaC  [facility]

## 2020-03-18 NOTE — PATIENT INSTRUCTIONS
Your strep test is negative.  Your wheezing is much better as compared to last time.  I am concerned that you could be developing a pneumonia as you are now coughing up phlegm and your fever has increased.  Today, we will treat you for pneumonia with an antibiotic, Augmentin.  You have also been prescribed a taper steroid dose to prove wheezing and breathing improved.  Continue to use your home medications including Flonase, Mucinex, albuterol.  Return to clinic if her symptoms are worsening or not improving.  If you experience significant difficulty breathing, wheezing, chest pain, please go to the emergency room.  Patient Education     Pneumonia (Adult)  Pneumonia is an infection deep within the lungs. It is in the small air sacs (alveoli). Pneumonia may be caused by a virus or bacteria. Pneumonia caused by bacteria is usually treated with an antibiotic. Severe cases may need to be treated in the hospital. Milder cases can be treated at home. Symptoms usually start to get better during the first 2 days of treatment.    Home care  Follow these guidelines when caring for yourself at home:    Rest at home for the first 2 to 3 days, or until you feel stronger. Don t let yourself get overly tired when you go back to your activities.    Stay away from cigarette smoke - yours or other people s.    You may use acetaminophen or ibuprofen to control fever or pain, unless another medicine was prescribed. If you have chronic liver or kidney disease, talk with your healthcare provider before using these medicines. Also talk with your provider if you ve had a stomach ulcer or gastrointestinal bleeding. Don t give aspirin to anyone younger than 18 years of age who is ill with a fever. It may cause severe liver damage.    Your appetite may be poor, so a light diet is fine.    Drink 6 to 8 glasses of fluids every day to make sure you are getting enough fluids. Beverages can include water, sport drinks, sodas without caffeine,  juices, tea, or soup. Fluids will help loosen secretions in the lung. This will make it easier for you to cough up the phlegm (sputum). If you also have heart or kidney disease, check with your healthcare provider before you drink extra fluids.    Take antibiotic medicine prescribed until it is all gone, even if you are feeling better after a few days.  Follow-up care  Follow up with your healthcare provider in the next 2 to 3 days, or as advised. This is to be sure the medicine is helping you get better.  If you are 65 or older, you should get a pneumococcal vaccine and a yearly flu (influenza) shot. You should also get these vaccines if you have chronic lung disease like asthma, emphysema, or COPD. Recently, a second type of pneumonia vaccine has become available for everyone over 65 years old. This is in addition to the previous vaccine. Ask your provider about this.  When to seek medical advice  Call your healthcare provider right away if any of these occur:    You don t get better within the first 48 hours of treatment    Shortness of breath gets worse    Rapid breathing (more than 25 breaths per minute)    Coughing up blood    Chest pain gets worse with breathing    Fever of 100.4 F (38 C) or higher that doesn t get better with fever medicine    Weakness, dizziness, or fainting that gets worse    Thirst or dry mouth that gets worse    Sinus pain, headache, or a stiff neck    Chest pain not caused by coughing  Date Last Reviewed: 1/1/2017 2000-2019 [employer]. [address] [place] [address], [place] [address]. All rights reserved. This information is not intended as a substitute for professional medical care. Always follow your healthcare professional's instructions.

## 2020-03-23 ENCOUNTER — MYC MEDICAL ADVICE (OUTPATIENT)
Dept: FAMILY MEDICINE | Facility: CLINIC | Age: 35
End: 2020-03-23

## 2020-03-23 DIAGNOSIS — B37.31 CANDIDIASIS OF VAGINA: Primary | ICD-10-CM

## 2020-03-23 RX ORDER — FLUCONAZOLE 150 MG/1
150 TABLET ORAL ONCE
Qty: 1 TABLET | Refills: 0 | Status: SHIPPED | OUTPATIENT
Start: 2020-03-23 | End: 2020-03-23

## 2020-03-23 NOTE — TELEPHONE ENCOUNTER
Spoke with patient who notes she is feeling much better. She commonly gets yeast infections with antibiotic use and is experiencing thick white vaginal discharge with recent Augmentin use.  Fluconazole prescribed to preferred pharmacy.    Cat Miranda PA-C on 3/23/2020 at 10:07 AM

## 2020-05-06 ENCOUNTER — MYC MEDICAL ADVICE (OUTPATIENT)
Dept: FAMILY MEDICINE | Facility: CLINIC | Age: 35
End: 2020-05-06

## 2020-05-07 NOTE — TELEPHONE ENCOUNTER
Called patient got voicemail and the mailbox is full not able to leave a message will wait to hear from patient.    Jessy Hoffmann [facility]

## 2020-08-23 DIAGNOSIS — J45.20 MILD INTERMITTENT ASTHMA WITHOUT COMPLICATION: ICD-10-CM

## 2020-08-23 DIAGNOSIS — J06.9 VIRAL URI: ICD-10-CM

## 2020-08-24 RX ORDER — ALBUTEROL SULFATE 90 UG/1
AEROSOL, METERED RESPIRATORY (INHALATION)
Qty: 18 INHALER | Refills: 0 | Status: SHIPPED | OUTPATIENT
Start: 2020-08-24 | End: 2020-10-05

## 2020-08-24 NOTE — TELEPHONE ENCOUNTER
Medication is being filled for 1 time refill only due to:  Patient needs to be seen because it has been more than one year since last visit.   Omar King RN

## 2020-10-03 DIAGNOSIS — J45.20 MILD INTERMITTENT ASTHMA WITHOUT COMPLICATION: ICD-10-CM

## 2020-10-03 DIAGNOSIS — J06.9 VIRAL URI: ICD-10-CM

## 2020-10-05 RX ORDER — ALBUTEROL SULFATE 90 UG/1
AEROSOL, METERED RESPIRATORY (INHALATION)
Qty: 18 INHALER | Refills: 0 | Status: SHIPPED | OUTPATIENT
Start: 2020-10-05 | End: 2020-12-23

## 2020-11-16 ENCOUNTER — HEALTH MAINTENANCE LETTER (OUTPATIENT)
Age: 35
End: 2020-11-16

## 2020-12-20 DIAGNOSIS — J06.9 VIRAL URI: ICD-10-CM

## 2020-12-20 DIAGNOSIS — J45.20 MILD INTERMITTENT ASTHMA WITHOUT COMPLICATION: ICD-10-CM

## 2020-12-21 NOTE — TELEPHONE ENCOUNTER
"Requested Prescriptions   Pending Prescriptions Disp Refills     albuterol (VENTOLIN HFA) 108 (90 Base) MCG/ACT inhaler [Pharmacy Med Name: VENTOLIN HFA 90 MCG INHALER] 18 Inhaler 0     Sig: INHALE 2 PUFFS EVERY 4-6 HOURS AS NEEDED FOR SHORTNESS OF BREATH.       Asthma Maintenance Inhalers - Anticholinergics Failed - 12/20/2020  7:22 AM        Failed - Asthma control assessment score within normal limits in last 6 months     Please review ACT score.           Failed - Recent (6 mo) or future (30 days) visit within the authorizing provider's specialty     Patient had office visit in the last 6 months or has a visit in the next 30 days with authorizing provider or within the authorizing provider's specialty.  See \"Patient Info\" tab in inbasket, or \"Choose Columns\" in Meds & Orders section of the refill encounter.            Passed - Patient is age 12 years or older        Passed - Medication is active on med list       Short-Acting Beta Agonist Inhalers Protocol  Failed - 12/20/2020  7:22 AM        Failed - Asthma control assessment score within normal limits in last 6 months     Please review ACT score.           Failed - Recent (6 mo) or future (30 days) visit within the authorizing provider's specialty     Patient had office visit in the last 6 months or has a visit in the next 30 days with authorizing provider or within the authorizing provider's specialty.  See \"Patient Info\" tab in inbasket, or \"Choose Columns\" in Meds & Orders section of the refill encounter.            Passed - Patient is age 12 or older        Passed - Medication is active on med list             "

## 2020-12-22 NOTE — TELEPHONE ENCOUNTER
Routing refill request to provider for review/approval because:  Patient needs to be seen because:  Asthma check, need update act  Dominique Chandler RN

## 2020-12-23 RX ORDER — ALBUTEROL SULFATE 90 UG/1
AEROSOL, METERED RESPIRATORY (INHALATION)
Qty: 18 INHALER | Refills: 0 | Status: SHIPPED | OUTPATIENT
Start: 2020-12-23 | End: 2021-03-26

## 2021-03-25 ENCOUNTER — MYC MEDICAL ADVICE (OUTPATIENT)
Dept: FAMILY MEDICINE | Facility: CLINIC | Age: 36
End: 2021-03-25

## 2021-03-26 ENCOUNTER — OFFICE VISIT (OUTPATIENT)
Dept: FAMILY MEDICINE | Facility: CLINIC | Age: 36
End: 2021-03-26
Payer: COMMERCIAL

## 2021-03-26 VITALS
BODY MASS INDEX: 23.96 KG/M2 | DIASTOLIC BLOOD PRESSURE: 96 MMHG | TEMPERATURE: 98 F | HEART RATE: 98 BPM | SYSTOLIC BLOOD PRESSURE: 146 MMHG | OXYGEN SATURATION: 100 % | HEIGHT: 65 IN | WEIGHT: 143.8 LBS

## 2021-03-26 DIAGNOSIS — J45.990 EXERCISE-INDUCED ASTHMA: ICD-10-CM

## 2021-03-26 DIAGNOSIS — L98.9 SKIN LESION: ICD-10-CM

## 2021-03-26 DIAGNOSIS — B35.4 TINEA CORPORIS: Primary | ICD-10-CM

## 2021-03-26 PROBLEM — J45.20 MILD INTERMITTENT ASTHMA WITHOUT COMPLICATION: Status: RESOLVED | Noted: 2017-12-13 | Resolved: 2021-03-26

## 2021-03-26 LAB
KOH PREP SPEC: ABNORMAL
SPECIMEN SOURCE: ABNORMAL

## 2021-03-26 PROCEDURE — 99213 OFFICE O/P EST LOW 20 MIN: CPT | Performed by: FAMILY MEDICINE

## 2021-03-26 PROCEDURE — 87220 TISSUE EXAM FOR FUNGI: CPT | Performed by: FAMILY MEDICINE

## 2021-03-26 RX ORDER — ALBUTEROL SULFATE 90 UG/1
AEROSOL, METERED RESPIRATORY (INHALATION)
Qty: 8 G | Refills: 3 | Status: SHIPPED | OUTPATIENT
Start: 2021-03-26 | End: 2021-07-27

## 2021-03-26 ASSESSMENT — MIFFLIN-ST. JEOR: SCORE: 1344.18

## 2021-03-26 NOTE — PATIENT INSTRUCTIONS
Patient Education     Ringworm of the Skin     Ringworm is a fungal infection of the skin. Despite the name, a worm doesn't cause it. The cause of ringworm is a fungus that infects the outer layers of the skin.  The medical term for ringworm is tinea. It can affect most parts of your body, although it seems to do better in moist areas of the body and around hair. It can be on almost any part of your body, including:    Arms, hands, legs, chest, feet, and back    Scalp    Beard    Groin    Between the toes  Depending on where it is located, sometimes the name changes. For example:    Tinea capitis (scalp)    Tinea cruris (groin)    Tinea corporis (body)    Tinea pedis (feet)  Causes  Ringworm is very common all over the world, including the [place] It can take less than 1 week up to 2 weeks before you develop the infection after being exposed. So, you may not figure out the exact cause.  It's spread through direct contact with:    An infected person or animal    Infected soil, or objects such as towels, clothing, and mendiola  Symptoms  At first you might not notice ringworm. Or you may just see a small, red, often raised itchy spot or pimple. Sometimes there may only be one spot. At other times there may be several. Ringworm can look slightly different on different parts of the body, but there are some things are always present:    Irregular, round, oval or ring-shaped, which is why it's called ringworm    Clearer or lighter color at the center, since it spreads from the center of the spot outward    Red or inflamed look    Raised    Itchy    Scaly, dry, or flaky  Home care  Follow these tips to help care for yourself at home:    Leave it alone. Don't scratch at the rash or pick it. This can increase the chance of infection and scarring.    Take medicine as prescribed. If you were prescribed a cream, apply it exactly as directed. Make sure to put the cream not just on the rash, but also on the skin 1 or 2 inches around  it. Medicine by mouth is sometimes needed, particularly for ringworm on the scalp. Take it as directed and until your healthcare provider says to stop. Some ringworm creams are now available without a prescription (over the counter). Talk with your healthcare provider about these, as they may be just as effective but less expensive than prescription medicines in some cases.    Keep it from spreading to others.  Untreated ringworm of the skin is contagious by skin-to-skin contact. An affected child may return to school 2 days after treatment has started.  Prevention  To some degree, prevention depends on what part of your body was affected. In general, the following good hygiene can help.    Clean up after you get dirty or sweaty, or after using a locker room.    When possible, don t share mendiola and brushes.    Avoid having your skin and feet wet or damp for long periods.    Wear clean, loose-fitting underwear.  Follow-up care  Follow up with your healthcare provider as advised by our staff if the rash does not improve after 10 days of treatment or if the rash spreads to other areas of the body.  When to seek medical care  Call your healthcare provider right away if any of these occur:    Redness around the rash gets worse    Fluid drains from the rash    Fever of 100.4 F (38 C) or higher, or as directed by your healthcare provider  [employer] last reviewed this educational content on 8/1/2019 2000-2020 [employer]. All rights reserved. This information is not intended as a substitute for professional medical care. Always follow your healthcare professional's instructions.

## 2021-03-26 NOTE — LETTER
My Asthma Action Plan    Name: Katerin Jasso   YOB: 1985  Date: 3/26/2021   My doctor: Johana Souza MD   My clinic: [facility]        My Rescue Medicine:   Albuterol inhaler (Proair/Ventolin/Proventil HFA)  2-4 puffs EVERY 4 HOURS as needed. Use a spacer if recommended by your provider.   My Asthma Severity:   Intermittent / Exercise Induced  Know your asthma triggers:   upper respiratory infections          GREEN ZONE   Good Control    I feel good    No cough or wheeze    Can work, sleep and play without asthma symptoms       Take your asthma control medicine every day.     1. If exercise triggers your asthma, take your rescue medication    15 minutes before exercise or sports, and    During exercise if you have asthma symptoms  2. Spacer to use with inhaler: If you have a spacer, make sure to use it with your inhaler             YELLOW ZONE Getting Worse  I have ANY of these:    I do not feel good    Cough or wheeze    Chest feels tight    Wake up at night   1. Keep taking your Green Zone medications  2. Start taking your rescue medicine:    every 20 minutes for up to 1 hour. Then every 4 hours for 24-48 hours.  3. If you stay in the Yellow Zone for more than 12-24 hours, contact your doctor.  4. If you do not return to the Green Zone in 12-24 hours or you get worse, start taking your oral steroid medicine if prescribed by your provider.           RED ZONE Medical Alert - Get Help  I have ANY of these:    I feel awful    Medicine is not helping    Breathing getting harder    Trouble walking or talking    Nose opens wide to breathe       1. Take your rescue medicine NOW  2. If your provider has prescribed an oral steroid medicine, start taking it NOW  3. Call your doctor NOW  4. If you are still in the Red Zone after 20 minutes and you have not reached your doctor:    Take your rescue medicine again and    Call 911 or go to the emergency room right away    See your  regular doctor within 2 weeks of an Emergency Room or Urgent Care visit for follow-up treatment.          Annual Reminders:  Meet with Asthma Educator,  Flu Shot in the Fall, consider Pneumonia Vaccination for patients with asthma (aged 19 and older).    Pharmacy:    [facility] [id number] [place] [address]    Electronically signed by Johana Souza MD   Date: 03/26/21                    Asthma Triggers  How To Control Things That Make Your Asthma Worse    Triggers are things that make your asthma worse.  Look at the list below to help you find your triggers and   what you can do about them. You can help prevent asthma flare-ups by staying away from your triggers.      Trigger                                                          What you can do   Cigarette Smoke  Tobacco smoke can make asthma worse. Do not allow smoking in your home, car or around you.  Be sure no one smokes at a child s day care or school.  If you smoke, ask your health care provider for ways to help you quit.  Ask family members to quit too.  Ask your health care provider for a referral to Quit Plan to help you quit smoking, or call 7-843-000-PLAN.     Colds, Flu, Bronchitis  These are common triggers of asthma. Wash your hands often.  Don t touch your eyes, nose or mouth.  Get a flu shot every year.     Dust Mites  These are tiny bugs that live in cloth or carpet. They are too small to see. Wash sheets and blankets in hot water every week.   Encase pillows and mattress in dust mite proof covers.  Avoid having carpet if you can. If you have carpet, vacuum weekly.   Use a dust mask and HEPA vacuum.   Pollen and Outdoor Mold  Some people are allergic to trees, grass, or weed pollen, or molds. Try to keep your windows closed.  Limit time out doors when pollen count is high.   Ask you health care provider about taking medicine during allergy season.     Animal Dander  Some people are allergic to skin  flakes, urine or saliva from pets with fur or feathers. Keep pets with fur or feathers out of your home.    If you can t keep the pet outdoors, then keep the pet out of your bedroom.  Keep the bedroom door closed.  Keep pets off cloth furniture and away from stuffed toys.     Mice, Rats, and Cockroaches  Some people are allergic to the waste from these pests.   Cover food and garbage.  Clean up spills and food crumbs.  Store grease in the refrigerator.   Keep food out of the bedroom.   Indoor Mold  This can be a trigger if your home has high moisture. Fix leaking faucets, pipes, or other sources of water.   Clean moldy surfaces.  Dehumidify basement if it is damp and smelly.   Smoke, Strong Odors, and Sprays  These can reduce air quality. Stay away from strong odors and sprays, such as perfume, powder, hair spray, paints, smoke incense, paint, cleaning products, candles and new carpet.   Exercise or Sports  Some people with asthma have this trigger. Be active!  Ask your doctor about taking medicine before sports or exercise to prevent symptoms.    Warm up for 5-10 minutes before and after sports or exercise.     Other Triggers of Asthma  Cold air:  Cover your nose and mouth with a scarf.  Sometimes laughing or crying can be a trigger.  Some medicines and food can trigger asthma.

## 2021-03-26 NOTE — PROGRESS NOTES
"    Assessment & Plan      (B35.4) Tinea corporis  (primary encounter diagnosis)  Comment: discussed diagnosis and treatment.  Info handout given. All questions answered   Plan:         (L98.9) Skin lesion  Comment:   Plan: KOH prep (skin, hair or nails only)            (J45.990) Exercise-induced asthma  Comment: stable. Med refilled   Plan: albuterol (VENTOLIN HFA) 108 (90 Base) MCG/ACT         inhaler            Return if symptoms worsen or fail to improve, for as needed based on discussion in clinic.    Johnaa Souza MD  [facility] RC Conklin is a 35 year old who presents for the following health issues - open wound     Red sore on right buttock  - initially a blister  No pain  Noticed it a week ago     Getting larger     Hasn't put anything on it     No other skin issues     No itch     Review of systems:   No f/c   Normal appetite   No myalgias or malaise         Objective    BP (!) 137/97   Pulse 98   Temp 98  F (36.7  C) (Tympanic)   Ht 1.645 m (5' 4.75\")   Wt 65.2 kg (143 lb 12.8 oz)   SpO2 100%   BMI 24.11 kg/m    Body mass index is 24.11 kg/m .  Physical Exam   GENERAL: healthy, alert and no distress  SKIN: on the right buttock, there is flat oval shaped reddish lesion with dry scaly edges - approximately 1.5 cm in diameter     Results for orders placed or performed in visit on 03/26/21   KOH prep (skin, hair or nails only)     Status: Abnormal    Specimen: Right Buttock   Result Value Ref Range    Specimen Description Right Buttock     KOH Skin Hair Nails Test Fungal elements seen (A)       "

## 2021-03-27 ASSESSMENT — ASTHMA QUESTIONNAIRES: ACT_TOTALSCORE: 23

## 2021-04-03 ENCOUNTER — HEALTH MAINTENANCE LETTER (OUTPATIENT)
Age: 36
End: 2021-04-03

## 2021-04-22 ENCOUNTER — MYC MEDICAL ADVICE (OUTPATIENT)
Dept: FAMILY MEDICINE | Facility: CLINIC | Age: 36
End: 2021-04-22

## 2021-04-22 DIAGNOSIS — B35.4 TINEA CORPORIS: Primary | ICD-10-CM

## 2021-04-22 RX ORDER — CLOTRIMAZOLE 1 %
CREAM (GRAM) TOPICAL 2 TIMES DAILY
Qty: 45 G | Refills: 1 | Status: SHIPPED | OUTPATIENT
Start: 2021-04-22 | End: 2023-07-24

## 2021-06-09 ENCOUNTER — MYC MEDICAL ADVICE (OUTPATIENT)
Dept: FAMILY MEDICINE | Facility: CLINIC | Age: 36
End: 2021-06-09

## 2021-07-27 ENCOUNTER — MYC MEDICAL ADVICE (OUTPATIENT)
Dept: FAMILY MEDICINE | Facility: CLINIC | Age: 36
End: 2021-07-27

## 2021-07-27 DIAGNOSIS — J45.990 EXERCISE-INDUCED ASTHMA: ICD-10-CM

## 2021-07-27 RX ORDER — ALBUTEROL SULFATE 90 UG/1
AEROSOL, METERED RESPIRATORY (INHALATION)
Qty: 18 G | Refills: 1 | Status: SHIPPED | OUTPATIENT
Start: 2021-07-27 | End: 2021-10-14

## 2021-09-18 ENCOUNTER — HEALTH MAINTENANCE LETTER (OUTPATIENT)
Age: 36
End: 2021-09-18

## 2021-10-14 DIAGNOSIS — J45.990 EXERCISE-INDUCED ASTHMA: ICD-10-CM

## 2021-10-14 RX ORDER — ALBUTEROL SULFATE 90 UG/1
AEROSOL, METERED RESPIRATORY (INHALATION)
Qty: 8.5 EACH | Refills: 1 | Status: SHIPPED | OUTPATIENT
Start: 2021-10-14 | End: 2022-04-28

## 2021-10-14 NOTE — TELEPHONE ENCOUNTER
Routing refill request to provider for review/approval because:  Patient needs to be seen because:  Due for annual exam - last annual > 2 years    Ketan Saleh RN

## 2022-01-08 ENCOUNTER — HEALTH MAINTENANCE LETTER (OUTPATIENT)
Age: 37
End: 2022-01-08

## 2022-04-27 DIAGNOSIS — J45.990 EXERCISE-INDUCED ASTHMA: ICD-10-CM

## 2022-04-27 NOTE — TELEPHONE ENCOUNTER
Routing refill request to provider for review/approval because:  ACT > year    Ketan Saleh RN

## 2022-04-28 RX ORDER — ALBUTEROL SULFATE 90 UG/1
AEROSOL, METERED RESPIRATORY (INHALATION)
Refills: 1 | OUTPATIENT
Start: 2022-04-28

## 2022-06-09 DIAGNOSIS — J45.990 EXERCISE-INDUCED ASTHMA: ICD-10-CM

## 2022-06-09 NOTE — TELEPHONE ENCOUNTER
Routing refill request to provider for review/approval because:  ACT overdue    Ketan Saleh, RN

## 2022-06-10 RX ORDER — ALBUTEROL SULFATE 90 UG/1
AEROSOL, METERED RESPIRATORY (INHALATION)
Qty: 8 G | Refills: 1 | Status: SHIPPED | OUTPATIENT
Start: 2022-06-10 | End: 2022-08-10

## 2022-08-10 DIAGNOSIS — J45.990 EXERCISE-INDUCED ASTHMA: ICD-10-CM

## 2022-08-10 RX ORDER — ALBUTEROL SULFATE 90 UG/1
AEROSOL, METERED RESPIRATORY (INHALATION)
Qty: 8 G | Refills: 0 | Status: SHIPPED | OUTPATIENT
Start: 2022-08-10 | End: 2022-09-28

## 2022-08-10 NOTE — TELEPHONE ENCOUNTER
One sent in for her.   Needs an office visit - schedule physical     I filled one inhaler     Please call her and schedule her and do ACT.     ANDRES Souza MD

## 2022-08-11 ASSESSMENT — ASTHMA QUESTIONNAIRES: ACT_TOTALSCORE: 22

## 2022-08-11 NOTE — TELEPHONE ENCOUNTER
Called and scheduled patient for physical. Updated ACT     ACT Total Scores 8/11/2022   ACT TOTAL SCORE    ASTHMA ER VISITS    ASTHMA HOSPITALIZATIONS    ACT TOTAL SCORE (Goal Greater than or Equal to 20) 22   In the past 12 months, how many times did you visit the emergency room for your asthma without being admitted to the hospital? 0   In the past 12 months, how many times were you hospitalized overnight because of your asthma?       Becky Harper LPN

## 2022-09-26 DIAGNOSIS — J45.990 EXERCISE-INDUCED ASTHMA: ICD-10-CM

## 2022-09-26 NOTE — TELEPHONE ENCOUNTER
Last filled by Dr. Walton as he was following patient for a pregnancy. Last office visit 9/25/18 for routine PP visit.    Patient is due for office visit to follow up on mild asthma. Patient notified to arrange appointment with provider in Family Practice.    Dr. Walton would not follow for this.    Patient reports understanding.    OK to refill x1 so patient is not out of inhaler?    Please review and advise.  Thank you.    Laila Collins   [facility]  RN     Ativan IV push 2 mg 9/25/22 22:51  Olanzapine 5 mg IM PRN for agitation last received 9/26/22 4:45

## 2022-09-27 NOTE — TELEPHONE ENCOUNTER
Routing refill request to provider for review/approval because:  Patient needs to be seen because it has been more than 1 year since last office visit. 3/26/21  8/11/22 ACT of 22  8/26/22 no showed appointment.  Thank you.  Omar King, RN

## 2022-09-28 RX ORDER — ALBUTEROL SULFATE 90 UG/1
AEROSOL, METERED RESPIRATORY (INHALATION)
Qty: 8.5 G | Refills: 1 | Status: SHIPPED | OUTPATIENT
Start: 2022-09-28 | End: 2023-01-19

## 2022-11-19 ENCOUNTER — HEALTH MAINTENANCE LETTER (OUTPATIENT)
Age: 37
End: 2022-11-19

## 2023-01-19 ENCOUNTER — MYC MEDICAL ADVICE (OUTPATIENT)
Dept: FAMILY MEDICINE | Facility: CLINIC | Age: 38
End: 2023-01-19
Payer: COMMERCIAL

## 2023-01-19 DIAGNOSIS — J45.990 EXERCISE-INDUCED ASTHMA: ICD-10-CM

## 2023-01-19 RX ORDER — ALBUTEROL SULFATE 90 UG/1
AEROSOL, METERED RESPIRATORY (INHALATION)
Qty: 8.5 G | Refills: 0 | Status: SHIPPED | OUTPATIENT
Start: 2023-01-19 | End: 2023-07-12

## 2023-04-09 ENCOUNTER — HEALTH MAINTENANCE LETTER (OUTPATIENT)
Age: 38
End: 2023-04-09

## 2023-05-03 ENCOUNTER — MYC MEDICAL ADVICE (OUTPATIENT)
Dept: FAMILY MEDICINE | Facility: CLINIC | Age: 38
End: 2023-05-03
Payer: COMMERCIAL

## 2023-05-03 DIAGNOSIS — J45.990 EXERCISE-INDUCED ASTHMA: ICD-10-CM

## 2023-05-03 RX ORDER — ALBUTEROL SULFATE 90 UG/1
AEROSOL, METERED RESPIRATORY (INHALATION)
Qty: 8.5 G | Refills: 0 | OUTPATIENT
Start: 2023-05-03

## 2023-05-03 NOTE — TELEPHONE ENCOUNTER
It has been more than two years since she has been seen.    Needs a visit if she needs more medication     ANDRES Souza MD

## 2023-06-27 ENCOUNTER — LAB (OUTPATIENT)
Dept: LAB | Facility: CLINIC | Age: 38
End: 2023-06-27
Attending: FAMILY MEDICINE
Payer: COMMERCIAL

## 2023-06-27 ENCOUNTER — E-VISIT (OUTPATIENT)
Dept: FAMILY MEDICINE | Facility: CLINIC | Age: 38
End: 2023-06-27
Payer: COMMERCIAL

## 2023-06-27 DIAGNOSIS — J02.9 ACUTE PHARYNGITIS, UNSPECIFIED ETIOLOGY: ICD-10-CM

## 2023-06-27 DIAGNOSIS — J02.9 ACUTE PHARYNGITIS, UNSPECIFIED ETIOLOGY: Primary | ICD-10-CM

## 2023-06-27 LAB
DEPRECATED S PYO AG THROAT QL EIA: NEGATIVE
GROUP A STREP BY PCR: NOT DETECTED

## 2023-06-27 PROCEDURE — 99421 OL DIG E/M SVC 5-10 MIN: CPT | Performed by: FAMILY MEDICINE

## 2023-06-27 PROCEDURE — 87651 STREP A DNA AMP PROBE: CPT

## 2023-06-27 NOTE — PATIENT INSTRUCTIONS
Thank you for choosing us for your care. Given your symptoms, I would like you to do a lab-only visit to determine what is causing them.  I have placed the orders.  Please schedule an appointment with the lab right here in [email or web address], or call [phone].  I will let you know when the results are back and next steps to take.

## 2023-07-10 DIAGNOSIS — J45.990 EXERCISE-INDUCED ASTHMA: ICD-10-CM

## 2023-07-10 NOTE — TELEPHONE ENCOUNTER
"Routing refill request to provider for review/approval because:  Patient needs to be seen because it has been more than 1 year since last office visit.        Requested Prescriptions   Pending Prescriptions Disp Refills    albuterol (PROAIR HFA/PROVENTIL HFA/VENTOLIN HFA) 108 (90 Base) MCG/ACT inhaler [Pharmacy Med Name: ALBUTEROL HFA (PROAIR) INHALER]       Si-2 PUFFS EVERY 4-6 HRS AS NEEDED FOR COUGH /SOB       Asthma Maintenance Inhalers - Anticholinergics Failed - 7/10/2023  2:26 PM        Failed - Asthma control assessment score within normal limits in last 6 months     Please review ACT score.           Passed - Patient is age 12 years or older        Passed - Medication is active on med list        Passed - Recent (6 mo) or future (30 days) visit within the authorizing provider's specialty     Patient had office visit in the last 6 months or has a visit in the next 30 days with authorizing provider or within the authorizing provider's specialty.  See \"Patient Info\" tab in inbasket, or \"Choose Columns\" in Meds & Orders section of the refill encounter.           Short-Acting Beta Agonist Inhalers Protocol  Failed - 7/10/2023  2:26 PM        Failed - Asthma control assessment score within normal limits in last 6 months     Please review ACT score.           Passed - Patient is age 12 or older        Passed - Medication is active on med list        Passed - Recent (6 mo) or future (30 days) visit within the authorizing provider's specialty     Patient had office visit in the last 6 months or has a visit in the next 30 days with authorizing provider or within the authorizing provider's specialty.  See \"Patient Info\" tab in inbasket, or \"Choose Columns\" in Meds & Orders section of the refill encounter.                     Leeann Dixon RN 07/10/23 5:35 PM    "

## 2023-07-11 RX ORDER — ALBUTEROL SULFATE 90 UG/1
AEROSOL, METERED RESPIRATORY (INHALATION)
OUTPATIENT
Start: 2023-07-11

## 2023-07-12 ENCOUNTER — MYC MEDICAL ADVICE (OUTPATIENT)
Dept: FAMILY MEDICINE | Facility: CLINIC | Age: 38
End: 2023-07-12
Payer: COMMERCIAL

## 2023-07-12 DIAGNOSIS — J45.990 EXERCISE-INDUCED ASTHMA: ICD-10-CM

## 2023-07-12 RX ORDER — ALBUTEROL SULFATE 90 UG/1
AEROSOL, METERED RESPIRATORY (INHALATION)
Qty: 8.5 G | Refills: 0 | Status: SHIPPED | OUTPATIENT
Start: 2023-07-12 | End: 2023-07-24

## 2023-07-12 NOTE — TELEPHONE ENCOUNTER
Routing refill request to provider for review/approval because:  Patient has not been seen in over 2 years - has appointment scheduled  Please review    Ketan Saleh RN

## 2023-07-24 ENCOUNTER — TELEPHONE (OUTPATIENT)
Dept: DERMATOLOGY | Facility: CLINIC | Age: 38
End: 2023-07-24

## 2023-07-24 ENCOUNTER — OFFICE VISIT (OUTPATIENT)
Dept: FAMILY MEDICINE | Facility: CLINIC | Age: 38
End: 2023-07-24
Payer: COMMERCIAL

## 2023-07-24 VITALS
DIASTOLIC BLOOD PRESSURE: 94 MMHG | HEIGHT: 65 IN | OXYGEN SATURATION: 100 % | SYSTOLIC BLOOD PRESSURE: 138 MMHG | BODY MASS INDEX: 24.36 KG/M2 | RESPIRATION RATE: 16 BRPM | TEMPERATURE: 98.5 F | WEIGHT: 146.2 LBS | HEART RATE: 90 BPM

## 2023-07-24 DIAGNOSIS — E55.9 VITAMIN D DEFICIENCY: ICD-10-CM

## 2023-07-24 DIAGNOSIS — Z13.220 SCREENING FOR LIPOID DISORDERS: ICD-10-CM

## 2023-07-24 DIAGNOSIS — N92.0 MENORRHAGIA WITH REGULAR CYCLE: ICD-10-CM

## 2023-07-24 DIAGNOSIS — Z00.00 ENCOUNTER FOR ROUTINE ADULT HEALTH EXAMINATION WITHOUT ABNORMAL FINDINGS: Primary | ICD-10-CM

## 2023-07-24 DIAGNOSIS — F41.1 GAD (GENERALIZED ANXIETY DISORDER): ICD-10-CM

## 2023-07-24 DIAGNOSIS — R03.0 ELEVATED BP WITHOUT DIAGNOSIS OF HYPERTENSION: ICD-10-CM

## 2023-07-24 DIAGNOSIS — J45.990 EXERCISE-INDUCED ASTHMA: ICD-10-CM

## 2023-07-24 DIAGNOSIS — E61.1 IRON DEFICIENCY: ICD-10-CM

## 2023-07-24 DIAGNOSIS — L98.9 SKIN LESION OF CHEST WALL: ICD-10-CM

## 2023-07-24 DIAGNOSIS — J45.31 MILD PERSISTENT ASTHMA WITH ACUTE EXACERBATION: ICD-10-CM

## 2023-07-24 LAB
ALBUMIN SERPL BCG-MCNC: 4.6 G/DL (ref 3.5–5.2)
ALP SERPL-CCNC: 83 U/L (ref 35–104)
ALT SERPL W P-5'-P-CCNC: 25 U/L (ref 0–50)
ANION GAP SERPL CALCULATED.3IONS-SCNC: 13 MMOL/L (ref 7–15)
AST SERPL W P-5'-P-CCNC: 39 U/L (ref 0–45)
BASOPHILS # BLD AUTO: 0 10E3/UL (ref 0–0.2)
BASOPHILS NFR BLD AUTO: 0 %
BILIRUB SERPL-MCNC: 0.4 MG/DL
BUN SERPL-MCNC: 7 MG/DL (ref 6–20)
CALCIUM SERPL-MCNC: 9.9 MG/DL (ref 8.6–10)
CHLORIDE SERPL-SCNC: 103 MMOL/L (ref 98–107)
CHOLEST SERPL-MCNC: 255 MG/DL
CREAT SERPL-MCNC: 0.68 MG/DL (ref 0.51–0.95)
DEPRECATED CALCIDIOL+CALCIFEROL SERPL-MC: 36 UG/L (ref 20–75)
DEPRECATED HCO3 PLAS-SCNC: 23 MMOL/L (ref 22–29)
EOSINOPHIL # BLD AUTO: 0.2 10E3/UL (ref 0–0.7)
EOSINOPHIL NFR BLD AUTO: 2 %
ERYTHROCYTE [DISTWIDTH] IN BLOOD BY AUTOMATED COUNT: 17.5 % (ref 10–15)
FERRITIN SERPL-MCNC: 28 NG/ML (ref 6–175)
GFR SERPL CREATININE-BSD FRML MDRD: >90 ML/MIN/1.73M2
GLUCOSE SERPL-MCNC: 106 MG/DL (ref 70–99)
HCT VFR BLD AUTO: 35.3 % (ref 35–47)
HDLC SERPL-MCNC: 83 MG/DL
HGB BLD-MCNC: 10.9 G/DL (ref 11.7–15.7)
IMM GRANULOCYTES # BLD: 0 10E3/UL
IMM GRANULOCYTES NFR BLD: 0 %
LDLC SERPL CALC-MCNC: 145 MG/DL
LYMPHOCYTES # BLD AUTO: 3.2 10E3/UL (ref 0.8–5.3)
LYMPHOCYTES NFR BLD AUTO: 35 %
MCH RBC QN AUTO: 24.9 PG (ref 26.5–33)
MCHC RBC AUTO-ENTMCNC: 30.9 G/DL (ref 31.5–36.5)
MCV RBC AUTO: 81 FL (ref 78–100)
MONOCYTES # BLD AUTO: 0.6 10E3/UL (ref 0–1.3)
MONOCYTES NFR BLD AUTO: 6 %
NEUTROPHILS # BLD AUTO: 5.2 10E3/UL (ref 1.6–8.3)
NEUTROPHILS NFR BLD AUTO: 56 %
NONHDLC SERPL-MCNC: 172 MG/DL
PLATELET # BLD AUTO: 422 10E3/UL (ref 150–450)
POTASSIUM SERPL-SCNC: 4.3 MMOL/L (ref 3.4–5.3)
PROT SERPL-MCNC: 7.8 G/DL (ref 6.4–8.3)
RBC # BLD AUTO: 4.38 10E6/UL (ref 3.8–5.2)
SODIUM SERPL-SCNC: 139 MMOL/L (ref 136–145)
TRIGL SERPL-MCNC: 136 MG/DL
TSH SERPL DL<=0.005 MIU/L-ACNC: 1.02 UIU/ML (ref 0.3–4.2)
WBC # BLD AUTO: 9.3 10E3/UL (ref 4–11)

## 2023-07-24 PROCEDURE — 99395 PREV VISIT EST AGE 18-39: CPT | Performed by: PHYSICIAN ASSISTANT

## 2023-07-24 PROCEDURE — 99213 OFFICE O/P EST LOW 20 MIN: CPT | Mod: 25 | Performed by: PHYSICIAN ASSISTANT

## 2023-07-24 PROCEDURE — 82728 ASSAY OF FERRITIN: CPT | Performed by: PHYSICIAN ASSISTANT

## 2023-07-24 PROCEDURE — 84443 ASSAY THYROID STIM HORMONE: CPT | Performed by: PHYSICIAN ASSISTANT

## 2023-07-24 PROCEDURE — 85025 COMPLETE CBC W/AUTO DIFF WBC: CPT | Performed by: PHYSICIAN ASSISTANT

## 2023-07-24 PROCEDURE — 82306 VITAMIN D 25 HYDROXY: CPT | Performed by: PHYSICIAN ASSISTANT

## 2023-07-24 PROCEDURE — 80061 LIPID PANEL: CPT | Performed by: PHYSICIAN ASSISTANT

## 2023-07-24 PROCEDURE — 36415 COLL VENOUS BLD VENIPUNCTURE: CPT | Performed by: PHYSICIAN ASSISTANT

## 2023-07-24 PROCEDURE — 80053 COMPREHEN METABOLIC PANEL: CPT | Performed by: PHYSICIAN ASSISTANT

## 2023-07-24 RX ORDER — PREDNISONE 20 MG/1
40 TABLET ORAL DAILY
Qty: 10 TABLET | Refills: 0 | Status: SHIPPED | OUTPATIENT
Start: 2023-07-24 | End: 2023-07-29

## 2023-07-24 RX ORDER — ALBUTEROL SULFATE 90 UG/1
AEROSOL, METERED RESPIRATORY (INHALATION)
Qty: 8.5 G | Refills: 0 | Status: SHIPPED | OUTPATIENT
Start: 2023-07-24 | End: 2023-10-20

## 2023-07-24 RX ORDER — CYCLOBENZAPRINE HCL 10 MG
10 TABLET ORAL DAILY
COMMUNITY
Start: 2023-05-19 | End: 2023-07-24

## 2023-07-24 RX ORDER — FLUTICASONE FUROATE 50 UG/1
1 POWDER RESPIRATORY (INHALATION) DAILY
Qty: 30 EACH | Refills: 3 | Status: SHIPPED | OUTPATIENT
Start: 2023-07-24

## 2023-07-24 RX ORDER — SERTRALINE HYDROCHLORIDE 25 MG/1
TABLET, FILM COATED ORAL
Qty: 74 TABLET | Refills: 1 | Status: SHIPPED | OUTPATIENT
Start: 2023-07-24 | End: 2023-09-06

## 2023-07-24 ASSESSMENT — ANXIETY QUESTIONNAIRES
7. FEELING AFRAID AS IF SOMETHING AWFUL MIGHT HAPPEN: NEARLY EVERY DAY
3. WORRYING TOO MUCH ABOUT DIFFERENT THINGS: NEARLY EVERY DAY
5. BEING SO RESTLESS THAT IT IS HARD TO SIT STILL: NEARLY EVERY DAY
IF YOU CHECKED OFF ANY PROBLEMS ON THIS QUESTIONNAIRE, HOW DIFFICULT HAVE THESE PROBLEMS MADE IT FOR YOU TO DO YOUR WORK, TAKE CARE OF THINGS AT HOME, OR GET ALONG WITH OTHER PEOPLE: VERY DIFFICULT
GAD7 TOTAL SCORE: 20
2. NOT BEING ABLE TO STOP OR CONTROL WORRYING: NEARLY EVERY DAY
1. FEELING NERVOUS, ANXIOUS, OR ON EDGE: NEARLY EVERY DAY
GAD7 TOTAL SCORE: 20
6. BECOMING EASILY ANNOYED OR IRRITABLE: NEARLY EVERY DAY

## 2023-07-24 ASSESSMENT — ENCOUNTER SYMPTOMS
ABDOMINAL PAIN: 0
FREQUENCY: 0
CHILLS: 0
SORE THROAT: 0
CONSTIPATION: 0
HEMATOCHEZIA: 0
COUGH: 0
ARTHRALGIAS: 0
FEVER: 0
DIARRHEA: 0
HEADACHES: 0
PARESTHESIAS: 0
DIZZINESS: 0
EYE PAIN: 1
HEARTBURN: 1
MYALGIAS: 0
SHORTNESS OF BREATH: 1
HEMATURIA: 0
NERVOUS/ANXIOUS: 1
PALPITATIONS: 0
BREAST MASS: 0
NAUSEA: 0
WEAKNESS: 0
JOINT SWELLING: 0
DYSURIA: 0

## 2023-07-24 ASSESSMENT — PATIENT HEALTH QUESTIONNAIRE - PHQ9
5. POOR APPETITE OR OVEREATING: MORE THAN HALF THE DAYS
SUM OF ALL RESPONSES TO PHQ QUESTIONS 1-9: 1

## 2023-07-24 ASSESSMENT — ASTHMA QUESTIONNAIRES: ACT_TOTALSCORE: 18

## 2023-07-24 NOTE — PATIENT INSTRUCTIONS
For asthma:  - prednisone oral steroid x5 days for asthma flare  - albuterol as needed for shortness of breath or wheezing  - start fluticasone/arnuity preventive inhaler daily    For anxiety:   - start zoloft 25 mg (1 tablet) for 1-2 weeks. Can stay at this dose or increase to 50 mg (2 tablets). Can increase further if needed  - see below for resources  - follow up with me in 1-2 months for a recheck    OBGYN referral to discuss heavy periods  - consider ultrasound prior to appointment     Will let you know about labwork results    For blood pressure: mychart me in a few weeks with results of blood pressure and pulse  - check readings at home/at the pharmacy, write them down and call or mychart with your numbers  - can come in for nurse visit appointment or our pharmacy can check your blood pressure by appointment   - goal 130/80   - low salt diet - look into DASH and Mediterranean diets    How do I check my blood pressure at home?  Once you have a home blood pressure meter, your doctor or nurse should check it to make sure it fits you and works correctly.  When it's time to check your blood pressure:  ?Go to the bathroom and empty your bladder first. Having a full bladder can temporarily increase your blood pressure, making the results inaccurate.  ?Sit in a chair with your feet flat on the ground.  ?Try to breathe normally and stay calm.  ?Attach the cuff to your arm. Place the cuff directly on your skin, not over your clothing. The cuff should be tight enough to not slip down, but not uncomfortably tight.  ?Sit and relax for about 3 to 5 minutes with the cuff on.  ?Follow the directions that came with your device to start measuring your blood pressure. This might involve squeezing the bulb at the end of the tube to inflate the cuff (fill it with air). With some monitors, you press a button to inflate the cuff. When the cuff fills with air, it feels like someone is squeezing your arm, but it should not hurt. Then  you will slowly deflate the cuff (let the air out of it), or it will deflate by itself. The screen or dial will show your blood pressure numbers.  ?Stay seated and relax for 1 minute, then measure your blood pressure again.    Anxiety is very common.  Many people experience some anxiety over the course of their lifetime.    There are lifestyle changes that can help anxiety.  Regular exercise is important for all adults.  I recommend 30 minutes most days of the week.  Get enough sleep.  Eat healthy. Find out what self care you need on a regular basis.  Counseling is often helpful for treating anxiety.   Mental exercises can help.  Mindfulness, breathing or progressive relaxation exercises can help.   Medications can help anxiety. There are many good options.      Recheck in a month to reevaluate, message earlier with significant side effects.  If taking medication for mental health, there can be some mild side effects.  If significant side effects that are difficult to live with, let us know, don't wait a month.      Exercises When you're feeling overwhelmed:  1. Deep breathing from your diaphragm. Put your hand over your belly if that helps. Breathe in through your nose, hold, out through your mouth   - 4 square breathing into the belly (4 second breathe IN through the nose into the belly, 4 seconds HOLD, 4 second breathe OUT through the mouth,4 seconds HOLD. repeat.)   -  try 4-7-8 method (4 seconds IN, 7seconds HOLD, 8 seconds OUT). Try shaping your mouth as if you are breathing out through a straw for a long slow exhale.  - Visualize breathing in a calming BLUE, allowing that blue to circulate & collect stressful energy, turning PURPLE, and breathing out RED as a release.   2. Muscle tension/relaxation - squeeze your fists/forearms then release them to release tension  3. Grounding yourself. 5-4-3-2-1. 5- things you see, 4- things you feel, 3- things you hear, 2- things you taste/smell, 1- how am I feeling?  What's one good thing about myself?  4. Thoughts turn to feelings turn to actions. You'll notice this when a negative thought can make you feel anxious or down, and in turn you act differently.  5. So turn around the negative thought by counteracting it with a positive one. What can the positive side of you say to that negative thought?  6. Think: when I worry I feel like I'm accomplishing something but I'm not. When is a good time to worry, when is a bad time?        Other therapy/counseling options:   BetterHelp : counseling sessions online ($)    [facility] -  affordable, in-office and online psychotherapy sessions between $30 and $60      Apps :  Glassbeam - Headspace - Smiling Mind -  Abide -- Calm -- Shine -- Insight Timer - Ten Percent Happier  - these are helpful for daily mindfulness and meditation, as well as sleep meditations to help you fall asleep more quickly and rest better  Woebot: free lele which trains you in individualized CBT (cognitive behavioral therapy) and mindfulness, and checks in with you    Self care toolkit of apps (all of these are free to download on google play and [facility])):  - take a breather: Smiling Mind, Headspace, Stop Breathe & Think  - talk it out: Wosree, Nicholas, Branden  - Game your goals: Happify, MoodMission, SuperBetter  - Track your thoughts: [place], Catch It, Daylio    For more information on finding the right lele for you, visit:   [email or web address].org/      Helpful Websites:   Visit www.helpguide.org for stress tools  Visit www.stressNexxo Financialedy.Vine Girls for guided meditation  Visit www.adaa.org for anxiety and depression resources    Good information and free webinars for managing mental health issues: https://adaa.org/understanding-anxiety  MELANIE [facility] Support groups in the [facility]      Yoga/mindfulness: youtube videos, apps

## 2023-07-24 NOTE — TELEPHONE ENCOUNTER
Chance Conklin states she is returning a call from the clinic. Please call Pt back at [phone], thanks!
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
## 2023-07-24 NOTE — TELEPHONE ENCOUNTER
Patient Contact    Attempt # 1    Was call answered?  No    Called patient. No answer. Left message to call back. Clinic number was provided.     Leeann Moran LPN   [facility]   [phone]

## 2023-07-24 NOTE — TELEPHONE ENCOUNTER
This encounter is being sent to inform the clinic that this patient has a referral from Rosario Simeon PA-C for the diagnoses of Skin lesion of chest wall [L98.9] and has requested that this patient be seen within 1-2 weeks.  Based on the availability of our provider(s), we are unable to accommodate this request.      Were all sites offered this patient?  Yes      Does scheduling algorithm request to schedule next available?  Patient has been scheduled for the first available opening with Bethany Dyer on 1/4/24.  We have informed the patient that the clinic will review their referral and reach out if a sooner appointment is medically necessary.

## 2023-07-25 NOTE — TELEPHONE ENCOUNTER
Patient Contact    Attempt # 2    Was call answered?  No    Called patient. No answer. Left message to call back. Clinic number was provided.      Leeann Moran LPN   [facility]   [phone]

## 2023-07-26 ENCOUNTER — MYC MEDICAL ADVICE (OUTPATIENT)
Dept: DERMATOLOGY | Facility: CLINIC | Age: 38
End: 2023-07-26
Payer: COMMERCIAL

## 2023-07-26 NOTE — TELEPHONE ENCOUNTER
Sent surinder asking for a photo to help determine how soon to schedule    Thank you,    Alisha DEUTSCHRN BSN  [facility]- [phone]

## 2023-07-27 ENCOUNTER — NURSE TRIAGE (OUTPATIENT)
Dept: FAMILY MEDICINE | Facility: CLINIC | Age: 38
End: 2023-07-27
Payer: COMMERCIAL

## 2023-07-27 ENCOUNTER — MYC MEDICAL ADVICE (OUTPATIENT)
Dept: FAMILY MEDICINE | Facility: CLINIC | Age: 38
End: 2023-07-27
Payer: COMMERCIAL

## 2023-07-27 PROBLEM — R03.0 ELEVATED BP WITHOUT DIAGNOSIS OF HYPERTENSION: Status: ACTIVE | Noted: 2023-07-27

## 2023-07-27 PROBLEM — F41.1 GAD (GENERALIZED ANXIETY DISORDER): Status: ACTIVE | Noted: 2023-07-27

## 2023-07-27 PROBLEM — J45.31 MILD PERSISTENT ASTHMA WITH ACUTE EXACERBATION: Status: ACTIVE | Noted: 2023-07-27

## 2023-07-27 PROBLEM — E55.9 VITAMIN D DEFICIENCY: Status: ACTIVE | Noted: 2023-07-27

## 2023-07-27 NOTE — TELEPHONE ENCOUNTER
If she has not taken prednisone recently it is more likely to be the prednisone than the sertraline.  She started prednisone sertraline and a new inhaler all at the same time she can stop the sertraline.  If her breathing is improved she can stop the prednisone.  . She hsould make a follow up appointment to discuss.  She also needs to take her pulse when she feels like her heart is racing.  If she has chest pain lasting within 10 to 15 minutes she needs to call 911.  If stopping the sertraline and prednisone are not helping she needs to stop the new inhaler and see if she feels better.  Also please remind patient that my chart is not appropriate for things that need to be addressed same day it may not get looked at and it clearly says that.  Holly Miller M.D.

## 2023-07-27 NOTE — TELEPHONE ENCOUNTER
"Patient sent a [employer] message: \"Good morning!     Is there a different anxiety med that I could try? This morning my heart was beating so fast and hard. This happened two nights ago also. It made me feel very sick. I am hoping a different medication won t make me feel that way.      Thank you,  Katerin\"    This morning at 5 am her heart was beating extremely fast and hard. Woke her up out of her sleep.  Didn't have it yesterday but did have a similar episode the day before yesterday before she went to bed.  Had chest pain during episode that felt like an aching feeling in the middle of her chest 6/10 pain. Non radiating to arm, neck, or shoulder.  Had lightheadedness during episode  Headache during episode that lasted only a minute. Was a dull feeling head felt heavy.  Did not take anything to help with pains. Laid down and around 9 she started feeling better.  No numbness or tingling,  No sweating during episode but felt warm- temp was 99.0  Did not feel cold or clammy during episode  No history of heart or thyroid disease  Takes sertraline around noon.   Prednisone right away in the AM.   Started new inhaler yesterday did take it before bed last night.    Red flag symptoms review with patient.  Routing to POD to review and advise.        Reason for Disposition    Heart beating very rapidly (e.g., > 140 / minute) and not present now  (Exception: During exercise.)    Additional Information    Negative: Passed out (i.e., lost consciousness, collapsed and was not responding)    Negative: Shock suspected (e.g., cold/pale/clammy skin, too weak to stand, low BP, rapid pulse)    Negative: Difficult to awaken or acting confused (e.g., disoriented, slurred speech)    Negative: Visible sweat on face or sweat dripping down face    Negative: Unable to walk, or can only walk with assistance (e.g., requires support)    Negative: Received SHOCK from implantable cardiac defibrillator and has persisting symptoms (i.e., " palpitations, lightheadedness)    Negative: Dizziness, lightheadedness, or weakness and heart beating very rapidly (e.g., > 140 / minute)    Negative: Dizziness, lightheadedness, or weakness and heart beating very slowly (e.g., < 50 / minute)    Negative: Sounds like a life-threatening emergency to the triager    Negative: Difficulty breathing    Negative: Dizziness, lightheadedness, or weakness    Negative: Heart beating very rapidly (e.g., > 140 / minute) and present now  (Exception: During exercise.)    Negative: Heart beating very slowly (e.g., < 50 / minute)  (Exception: Athlete and heart rate normal for caller.)    Negative: Wearing a 'Holter monitor' or 'cardiac event monitor'    Negative: Received SHOCK from implantable cardiac defibrillator (and now feels well)    Protocols used: Heart Rate and Heartbeat [id number]

## 2023-07-27 NOTE — TELEPHONE ENCOUNTER
Patient returned call   Relayed Dr. Miller's message    Patient verbalized understanding  No further questions/concerns    Ketan Saleh RN

## 2023-07-27 NOTE — TELEPHONE ENCOUNTER
RN tried to reach patient.   No answer.   LVM to call back to [phone].     Leeann Dixon RN on 7/27/2023 at 2:16 PM

## 2023-08-01 ENCOUNTER — OFFICE VISIT (OUTPATIENT)
Dept: DERMATOLOGY | Facility: CLINIC | Age: 38
End: 2023-08-01
Payer: COMMERCIAL

## 2023-08-01 DIAGNOSIS — L82.0 INFLAMED SEBORRHEIC KERATOSIS: ICD-10-CM

## 2023-08-01 DIAGNOSIS — L82.1 SEBORRHEIC KERATOSES: Primary | ICD-10-CM

## 2023-08-01 DIAGNOSIS — L81.4 LENTIGO: ICD-10-CM

## 2023-08-01 DIAGNOSIS — D18.01 ANGIOMA OF SKIN: ICD-10-CM

## 2023-08-01 DIAGNOSIS — D23.9 DERMAL NEVUS: ICD-10-CM

## 2023-08-01 PROCEDURE — 99243 OFF/OP CNSLTJ NEW/EST LOW 30: CPT | Mod: 25 | Performed by: DERMATOLOGY

## 2023-08-01 PROCEDURE — 17110 DESTRUCTION B9 LES UP TO 14: CPT | Performed by: DERMATOLOGY

## 2023-08-01 NOTE — LETTER
2023         RE: Katerin Jasso  [address]  [facility] [address]        Dear Colleague,    Thank you for referring your patient, Katerin Jasso, to the [facility]. Please see a copy of my visit note below.    Katerin Jasso , a 38 year old year old female patient, I was asked to see by DAVID Simeon for spot on chest.  Patient states this has been present for a while.  Patient reports the following symptoms:  itching .  Patient reports the following previous treatments none.  Patient reports the following modifying factors none.  Associated symptoms: none.  Patient has no other skin complaints today.  Remainder of the HPI, Meds, PMH, Allergies, FH, and SH was reviewed in chart.      Past Medical History:   Diagnosis Date     Asthma attack age 6    hospitalized as a child     Chickenpox      Papanicolaou smear of cervix with low grade squamous intraepithelial lesion (LGSIL) 2008    + HPV 16. Colpo and f/u paps NIL     Tobacco use disorder 2015    Quit date 2015 !      Urinary tract bacterial infections     age [age]       Past Surgical History:   Procedure Laterality Date      SECTION N/A 12/10/2015    Procedure:  SECTION;  Surgeon: Norma Sharma MD;  Location: [facility] OR      SECTION, TUBAL LIGATION, COMBINED N/A 2018    Procedure: COMBINED  SECTION, TUBAL LIGATION;   Section and Tubal Sterilization;  Surgeon: Nikita Walton MD;  Location: [facility] OR     MOUTH SURGERY      wisdom teeth     TUBAL LIGATION          Family History   Problem Relation Age of Onset     Allergies Mother         food allergies as well as seasonal and bees     Asthma Mother      Hypertension Father      C.A.D. Father      Gastrointestinal Disease Father         IBS- ulcer     Hypertension Paternal Grandmother      Arthritis Paternal Grandmother      Cerebrovascular Disease Paternal Grandmother      Cancer Paternal Grandfather          bladder cancer     Alzheimer Disease Paternal Grandfather      Tuberculosis Maternal Grandfather      Diabetes No family hx of      Breast Cancer No family hx of      Cancer - colorectal No family hx of        Social History     Socioeconomic History     Marital status:      Spouse name: Not on file     Number of children: 1     Years of education: Not on file     Highest education level: Not on file   Occupational History     Not on file   Tobacco Use     Smoking status: Former     Packs/day: 0.50     Types: Cigarettes     Quit date: 2018     Years since quittin.5     Smokeless tobacco: Never     Tobacco comments:     quit with pregnancy -   Substance and Sexual Activity     Alcohol use: Yes     Comment: rare- quit with pregnancy     Drug use: No     Sexual activity: Yes     Partners: Male     Birth control/protection: Female Surgical     Comment:  since . tubal ligation.   Other Topics Concern     Parent/sibling w/ CABG, MI or angioplasty before 65F 55M? No   Social History Narrative     Not on file     Social Determinants of Health     Financial Resource Strain: Not on file   Food Insecurity: Not on file   Transportation Needs: Not on file   Physical Activity: Not on file   Stress: Not on file   Social Connections: Not on file   Intimate Partner Violence: Not on file   Housing Stability: Not on file       Outpatient Encounter Medications as of 2023   Medication Sig Dispense Refill     albuterol (2.5 MG/3ML) 0.083% neb solution Take 1 vial (2.5 mg) by nebulization every 6 hours as needed for shortness of breath / dyspnea or wheezing (Patient not taking: Reported on 2023) 60 vial 1     albuterol (PROAIR HFA/PROVENTIL HFA/VENTOLIN HFA) 108 (90 Base) MCG/ACT inhaler 1-2 puffs every 4-6 hrs prn cough /sob 8.5 g 0     fluticasone (ARNUITY ELLIPTA) 50 MCG/ACT inhaler Inhale 1 puff into the lungs daily 30 each 3     Omeprazole 20 MG TBDD Take by mouth.       sertraline (ZOLOFT) 25  MG tablet Take 1 tablet (25 mg) by mouth daily for 14 days, THEN 2 tablets (50 mg) daily for 30 days. 74 tablet 1     No facility-administered encounter medications on file as of 8/1/2023.             Review Of Systems  Skin: As above  Eyes: negative  Ears/Nose/Throat: negative  Respiratory: No shortness of breath, dyspnea on exertion, cough, or hemoptysis  Cardiovascular: negative  Gastrointestinal: negative  Genitourinary: negative  Musculoskeletal: negative  Neurologic: negative  Psychiatric: negative  Hematologic/Lymphatic/Immunologic: negative  Endocrine: negative      O:   NAD, WDWN, Alert & Oriented, Mood & Affect wnl, Vitals stable   Here today with children   General appearance srikanth ii   Vitals stable   Alert, oriented and in no acute distress   L chest inflamed seborrheic keratosis   Stuck on papules and brown macules on trunk and ext   Flesh colored papules on face  Red papules on trunk      Eyes: Conjunctivae/lids:Normal     ENT: Lips, buccal mucosa, tongue: normal    MSK:Normal    Cardiovascular: peripheral edema none    Pulm: Breathing Normal    Neuro/Psych: Orientation:Normal; Mood/Affect:Normal      A/P:  1. Seborrheic keratosis, lentigo, angioma, dermal nevus  2.L chest inflamed seborrheic keratosis   LN2:  Treated with LN2 for 5s for 1-2 cycles. Warned risks of blistering, pain, pigment change, scarring, and incomplete resolution.  Advised patient to return if lesions do not completely resolve.  Wound care sheet given.  It was a pleasure speaking to Katerin Jasso today.  Previous clinic  notes and pertinent laboratory tests were reviewed prior to Katerin Jasso's visit.  Nature and genetics of benign skin lesions dicussed with patient.  Patient encouraged to perform monthly skin exams.  UV precautions reviewed with patient.  Return to clinic 12m onths      Again, thank you for allowing me to participate in the care of your patient.        Sincerely,        Aaron Cohen MD

## 2023-08-01 NOTE — PROGRESS NOTES
Katerin Jasso , a 38 year old year old female patient, I was asked to see by DAVID Simeon for spot on chest.  Patient states this has been present for a while.  Patient reports the following symptoms:  itching .  Patient reports the following previous treatments none.  Patient reports the following modifying factors none.  Associated symptoms: none.  Patient has no other skin complaints today.  Remainder of the HPI, Meds, PMH, Allergies, FH, and SH was reviewed in chart.      Past Medical History:   Diagnosis Date    Asthma attack age 6    hospitalized as a child    Chickenpox     Papanicolaou smear of cervix with low grade squamous intraepithelial lesion (LGSIL) 2008    + HPV 16. Colpo and f/u paps NIL    Tobacco use disorder 2015    Quit date 2015 !     Urinary tract bacterial infections     age [age]       Past Surgical History:   Procedure Laterality Date     SECTION N/A 12/10/2015    Procedure:  SECTION;  Surgeon: Norma Sharma MD;  Location: [facility]     SECTION, TUBAL LIGATION, COMBINED N/A 2018    Procedure: COMBINED  SECTION, TUBAL LIGATION;   Section and Tubal Sterilization;  Surgeon: Nikita Walton MD;  Location: [facility]    MOUTH SURGERY      wisdom teeth    TUBAL LIGATION          Family History   Problem Relation Age of Onset    Allergies Mother         food allergies as well as seasonal and bees    Asthma Mother     Hypertension Father     C.A.D. Father     Gastrointestinal Disease Father         IBS- ulcer    Hypertension Paternal Grandmother     Arthritis Paternal Grandmother     Cerebrovascular Disease Paternal Grandmother     Cancer Paternal Grandfather         bladder cancer    Alzheimer Disease Paternal Grandfather     Tuberculosis Maternal Grandfather     Diabetes No family hx of     Breast Cancer No family hx of     Cancer - colorectal No family hx of        Social History     Socioeconomic History    Marital status:       Spouse name: Not on file    Number of children: 1    Years of education: Not on file    Highest education level: Not on file   Occupational History    Not on file   Tobacco Use    Smoking status: Former     Packs/day: 0.50     Types: Cigarettes     Quit date: 2018     Years since quittin.5    Smokeless tobacco: Never    Tobacco comments:     quit with pregnancy -   Substance and Sexual Activity    Alcohol use: Yes     Comment: rare- quit with pregnancy    Drug use: No    Sexual activity: Yes     Partners: Male     Birth control/protection: Female Surgical     Comment:  since . tubal ligation.   Other Topics Concern    Parent/sibling w/ CABG, MI or angioplasty before 65F 55M? No   Social History Narrative    Not on file     Social Determinants of Health     Financial Resource Strain: Not on file   Food Insecurity: Not on file   Transportation Needs: Not on file   Physical Activity: Not on file   Stress: Not on file   Social Connections: Not on file   Intimate Partner Violence: Not on file   Housing Stability: Not on file       Outpatient Encounter Medications as of 2023   Medication Sig Dispense Refill    albuterol (2.5 MG/3ML) 0.083% neb solution Take 1 vial (2.5 mg) by nebulization every 6 hours as needed for shortness of breath / dyspnea or wheezing (Patient not taking: Reported on 2023) 60 vial 1    albuterol (PROAIR HFA/PROVENTIL HFA/VENTOLIN HFA) 108 (90 Base) MCG/ACT inhaler 1-2 puffs every 4-6 hrs prn cough /sob 8.5 g 0    fluticasone (ARNUITY ELLIPTA) 50 MCG/ACT inhaler Inhale 1 puff into the lungs daily 30 each 3    Omeprazole 20 MG TBDD Take by mouth.      sertraline (ZOLOFT) 25 MG tablet Take 1 tablet (25 mg) by mouth daily for 14 days, THEN 2 tablets (50 mg) daily for 30 days. 74 tablet 1     No facility-administered encounter medications on file as of 2023.             Review Of Systems  Skin: As above  Eyes: negative  Ears/Nose/Throat: negative  Respiratory: No  shortness of breath, dyspnea on exertion, cough, or hemoptysis  Cardiovascular: negative  Gastrointestinal: negative  Genitourinary: negative  Musculoskeletal: negative  Neurologic: negative  Psychiatric: negative  Hematologic/Lymphatic/Immunologic: negative  Endocrine: negative      O:   NAD, WDWN, Alert & Oriented, Mood & Affect wnl, Vitals stable   Here today with children   General appearance srikanth ii   Vitals stable   Alert, oriented and in no acute distress   L chest inflamed seborrheic keratosis   Stuck on papules and brown macules on trunk and ext   Flesh colored papules on face  Red papules on trunk      Eyes: Conjunctivae/lids:Normal     ENT: Lips, buccal mucosa, tongue: normal    MSK:Normal    Cardiovascular: peripheral edema none    Pulm: Breathing Normal    Neuro/Psych: Orientation:Normal; Mood/Affect:Normal      A/P:  1. Seborrheic keratosis, lentigo, angioma, dermal nevus  2.L chest inflamed seborrheic keratosis   LN2:  Treated with LN2 for 5s for 1-2 cycles. Warned risks of blistering, pain, pigment change, scarring, and incomplete resolution.  Advised patient to return if lesions do not completely resolve.  Wound care sheet given.  It was a pleasure speaking to Katerin Jasso today.  Previous clinic  notes and pertinent laboratory tests were reviewed prior to Katerin Jasso's visit.  Nature and genetics of benign skin lesions dicussed with patient.  Patient encouraged to perform monthly skin exams.  UV precautions reviewed with patient.  Return to clinic 12m onths

## 2023-08-04 ENCOUNTER — MYC MEDICAL ADVICE (OUTPATIENT)
Dept: FAMILY MEDICINE | Facility: CLINIC | Age: 38
End: 2023-08-04
Payer: COMMERCIAL

## 2023-08-04 DIAGNOSIS — F41.1 GAD (GENERALIZED ANXIETY DISORDER): ICD-10-CM

## 2023-08-04 DIAGNOSIS — F41.1 GAD (GENERALIZED ANXIETY DISORDER): Primary | ICD-10-CM

## 2023-08-04 RX ORDER — FLUOXETINE 10 MG/1
CAPSULE ORAL
Qty: 30 CAPSULE | Refills: 1 | Status: SHIPPED | OUTPATIENT
Start: 2023-08-04 | End: 2023-09-10

## 2023-08-07 ENCOUNTER — TELEPHONE (OUTPATIENT)
Dept: FAMILY MEDICINE | Facility: CLINIC | Age: 38
End: 2023-08-07
Payer: COMMERCIAL

## 2023-08-07 DIAGNOSIS — F41.1 GAD (GENERALIZED ANXIETY DISORDER): Primary | ICD-10-CM

## 2023-08-07 RX ORDER — FLUOXETINE 10 MG/1
CAPSULE ORAL
Qty: 30 CAPSULE | Refills: 1 | OUTPATIENT
Start: 2023-08-07 | End: 2023-09-13

## 2023-08-07 NOTE — TELEPHONE ENCOUNTER
"April from [facility] is calling to clarify the fluoxetine.  Current sig says \"Take 1 capsule (10 mg) by mouth daily for 7 days, THEN 1 capsule (10 mg) daily for 30 days.\"    They are wondering if she is increasing to 20 mg and if she is they need a script sent for Fluoxetine 20mg because most insurances won't cover 10 mg with a  sig to take 2 capsules.    Routing to provider to clarify and send new script.    Leeann Dixon RN on 8/7/2023 at 11:40 AM    "

## 2023-10-20 DIAGNOSIS — J45.990 EXERCISE-INDUCED ASTHMA: ICD-10-CM

## 2023-10-20 RX ORDER — ALBUTEROL SULFATE 90 UG/1
AEROSOL, METERED RESPIRATORY (INHALATION)
Qty: 8.5 G | Refills: 0 | Status: SHIPPED | OUTPATIENT
Start: 2023-10-20 | End: 2024-01-02

## 2023-10-20 NOTE — TELEPHONE ENCOUNTER
Refilled. Due for office visit for ACT and PAP smear. Had preventive this summer but did not do PAP.  Raina Simeon PA-C

## 2023-12-30 DIAGNOSIS — J45.990 EXERCISE-INDUCED ASTHMA: ICD-10-CM

## 2024-01-02 RX ORDER — ALBUTEROL SULFATE 90 UG/1
AEROSOL, METERED RESPIRATORY (INHALATION)
Qty: 8.5 G | Refills: 0 | Status: SHIPPED | OUTPATIENT
Start: 2024-01-02 | End: 2024-05-28

## 2024-05-24 DIAGNOSIS — J45.990 EXERCISE-INDUCED ASTHMA: ICD-10-CM

## 2024-05-28 RX ORDER — ALBUTEROL SULFATE 90 UG/1
AEROSOL, METERED RESPIRATORY (INHALATION)
Qty: 8.5 G | Refills: 0 | Status: SHIPPED | OUTPATIENT
Start: 2024-05-28 | End: 2024-07-12

## 2024-07-12 DIAGNOSIS — J45.990 EXERCISE-INDUCED ASTHMA: ICD-10-CM

## 2024-07-12 RX ORDER — ALBUTEROL SULFATE 90 UG/1
AEROSOL, METERED RESPIRATORY (INHALATION)
Qty: 18 G | Refills: 0 | Status: SHIPPED | OUTPATIENT
Start: 2024-07-12 | End: 2024-08-09

## 2024-07-12 NOTE — TELEPHONE ENCOUNTER
"Requested Prescriptions   Pending Prescriptions Disp Refills    albuterol (PROAIR HFA/PROVENTIL HFA/VENTOLIN HFA) 108 (90 Base) MCG/ACT inhaler [Pharmacy Med Name: ALBUTEROL HFA (PROVENTIL) INH]       Sig: INHALE 1-2 PUFFS BY MOUTH EVERY 4-6 HRS AS NEEDED FOR COUGH OR SHORTNESS OF BREATH       Short-Acting Beta Agonist Inhalers Protocol  Failed - 7/12/2024 12:21 PM        Failed - Asthma control assessment score within normal limits in last 6 months     Please review ACT score.           Failed - Recent (6 mo) or future (30 days) visit within the authorizing provider's specialty     Patient had office visit in the last 6 months or has a visit in the next 30 days with authorizing provider or within the authorizing provider's specialty.  See \"Patient Info\" tab in inbasket, or \"Choose Columns\" in Meds & Orders section of the refill encounter.            Passed - Patient is age 12 or older        Passed - Medication is active on med list             "

## 2024-08-08 DIAGNOSIS — J45.990 EXERCISE-INDUCED ASTHMA: ICD-10-CM

## 2024-08-08 RX ORDER — ALBUTEROL SULFATE 90 UG/1
AEROSOL, METERED RESPIRATORY (INHALATION)
OUTPATIENT
Start: 2024-08-08

## 2024-08-08 NOTE — TELEPHONE ENCOUNTER
Noted. RN called patient. Not in the middle of an exacerbation.  She is not currently having any symptoms. She requested a refill because she left the other inhaler at her cabin.   She is going to schedule via [employer] right now.    Leeann Dixon RN on 8/8/2024 at 3:57 PM

## 2024-08-08 NOTE — TELEPHONE ENCOUNTER
Pending Prescriptions:                       Disp   Refills    albuterol (PROAIR HFA/PROVENTIL HFA/SALVADOR*                    Sig: INHALE 1-2 PUFFS BY MOUTH EVERY 4-6 HRS AS NEEDED           FOR COUGH OR SHORTNESS OF BREATH    Routing refill request to provider for review/approval because:  sthma control assessment score within normal limits in last 6 months    Recent (6 mo) or future (30 days) visit within the authorizing provider's specialty         Kapil Liu RN

## 2024-08-08 NOTE — TELEPHONE ENCOUNTER
I am concerned as this inhaler was just refilled on 7/12.  Please contact to triage her asthma symptoms.  She also has been notified multiple times of need for follow up as she has not been seen in >1 year. Please schedule preventive/asthma check.  Raina Simeon PA-C

## 2024-08-09 RX ORDER — ALBUTEROL SULFATE 90 UG/1
1-2 AEROSOL, METERED RESPIRATORY (INHALATION) EVERY 6 HOURS PRN
Qty: 18 G | Refills: 0 | Status: SHIPPED | OUTPATIENT
Start: 2024-08-09 | End: 2024-09-25

## 2024-08-09 NOTE — TELEPHONE ENCOUNTER
Refilled as it is the weekend and I do not want her without an inhaler. However she has not scheduled on mychart and will need preventive visit on the schedule prior to another refill.  Raina Simeon PA-C

## 2024-08-25 ENCOUNTER — HEALTH MAINTENANCE LETTER (OUTPATIENT)
Age: 39
End: 2024-08-25

## 2024-09-25 ENCOUNTER — MYC MEDICAL ADVICE (OUTPATIENT)
Dept: FAMILY MEDICINE | Facility: CLINIC | Age: 39
End: 2024-09-25
Payer: COMMERCIAL

## 2024-09-25 DIAGNOSIS — J45.990 EXERCISE-INDUCED ASTHMA: ICD-10-CM

## 2024-09-25 RX ORDER — ALBUTEROL SULFATE 90 UG/1
1-2 AEROSOL, METERED RESPIRATORY (INHALATION) EVERY 6 HOURS PRN
OUTPATIENT
Start: 2024-09-25

## 2024-09-25 RX ORDER — ALBUTEROL SULFATE 90 UG/1
1-2 AEROSOL, METERED RESPIRATORY (INHALATION) EVERY 6 HOURS PRN
Qty: 18 G | Refills: 1 | Status: SHIPPED | OUTPATIENT
Start: 2024-09-25

## 2024-09-25 NOTE — TELEPHONE ENCOUNTER
Medication refilled as patient scheduled appointment       Requested Prescriptions   Signed Prescriptions Disp Refills    albuterol (PROAIR HFA/PROVENTIL HFA/VENTOLIN HFA) 108 (90 Base) MCG/ACT inhaler 18 g 1     Sig: Inhale 1-2 puffs into the lungs every 6 hours as needed for shortness of breath, wheezing or cough. NEEDS TO SCHEDULE APPOINTMENT PRIOR TO REFILLS       There is no refill protocol information for this order              Ketan ALICIA Saleh RN 09/25/24 12:03 PM

## 2024-12-09 ENCOUNTER — OFFICE VISIT (OUTPATIENT)
Dept: FAMILY MEDICINE | Facility: CLINIC | Age: 39
End: 2024-12-09
Payer: COMMERCIAL

## 2024-12-09 VITALS
TEMPERATURE: 99.3 F | RESPIRATION RATE: 16 BRPM | BODY MASS INDEX: 23.91 KG/M2 | SYSTOLIC BLOOD PRESSURE: 134 MMHG | HEIGHT: 65 IN | HEART RATE: 98 BPM | WEIGHT: 143.5 LBS | DIASTOLIC BLOOD PRESSURE: 84 MMHG | OXYGEN SATURATION: 100 %

## 2024-12-09 DIAGNOSIS — F41.1 GAD (GENERALIZED ANXIETY DISORDER): ICD-10-CM

## 2024-12-09 DIAGNOSIS — J45.30 MILD PERSISTENT ASTHMA WITHOUT COMPLICATION: ICD-10-CM

## 2024-12-09 DIAGNOSIS — R41.840 INATTENTION: ICD-10-CM

## 2024-12-09 DIAGNOSIS — L98.9 SKIN LESION: ICD-10-CM

## 2024-12-09 DIAGNOSIS — Z12.4 CERVICAL CANCER SCREENING: ICD-10-CM

## 2024-12-09 DIAGNOSIS — R03.0 ELEVATED BP WITHOUT DIAGNOSIS OF HYPERTENSION: ICD-10-CM

## 2024-12-09 DIAGNOSIS — D50.0 IRON DEFICIENCY ANEMIA DUE TO CHRONIC BLOOD LOSS: ICD-10-CM

## 2024-12-09 DIAGNOSIS — Z00.00 ROUTINE GENERAL MEDICAL EXAMINATION AT A HEALTH CARE FACILITY: Primary | ICD-10-CM

## 2024-12-09 DIAGNOSIS — K21.9 GASTROESOPHAGEAL REFLUX DISEASE WITHOUT ESOPHAGITIS: ICD-10-CM

## 2024-12-09 PROCEDURE — 99213 OFFICE O/P EST LOW 20 MIN: CPT | Mod: 25 | Performed by: PHYSICIAN ASSISTANT

## 2024-12-09 PROCEDURE — 99395 PREV VISIT EST AGE 18-39: CPT | Performed by: PHYSICIAN ASSISTANT

## 2024-12-09 RX ORDER — SERTRALINE HYDROCHLORIDE 25 MG/1
TABLET, FILM COATED ORAL
Qty: 74 TABLET | Refills: 1 | Status: SHIPPED | OUTPATIENT
Start: 2024-12-09 | End: 2025-01-21

## 2024-12-09 RX ORDER — FLUTICASONE FUROATE 50 UG/1
1 POWDER RESPIRATORY (INHALATION) DAILY
Qty: 30 EACH | Refills: 3 | Status: SHIPPED | OUTPATIENT
Start: 2024-12-09

## 2024-12-09 RX ORDER — ALBUTEROL SULFATE 0.83 MG/ML
2.5 SOLUTION RESPIRATORY (INHALATION) EVERY 6 HOURS PRN
Qty: 60 ML | Refills: 1 | Status: SHIPPED | OUTPATIENT
Start: 2024-12-09

## 2024-12-09 SDOH — HEALTH STABILITY: PHYSICAL HEALTH: ON AVERAGE, HOW MANY DAYS PER WEEK DO YOU ENGAGE IN MODERATE TO STRENUOUS EXERCISE (LIKE A BRISK WALK)?: 5 DAYS

## 2024-12-09 ASSESSMENT — ASTHMA QUESTIONNAIRES
QUESTION_4 LAST FOUR WEEKS HOW OFTEN HAVE YOU USED YOUR RESCUE INHALER OR NEBULIZER MEDICATION (SUCH AS ALBUTEROL): TWO OR THREE TIMES PER WEEK
QUESTION_3 LAST FOUR WEEKS HOW OFTEN DID YOUR ASTHMA SYMPTOMS (WHEEZING, COUGHING, SHORTNESS OF BREATH, CHEST TIGHTNESS OR PAIN) WAKE YOU UP AT NIGHT OR EARLIER THAN USUAL IN THE MORNING: NOT AT ALL
QUESTION_1 LAST FOUR WEEKS HOW MUCH OF THE TIME DID YOUR ASTHMA KEEP YOU FROM GETTING AS MUCH DONE AT WORK, SCHOOL OR AT HOME: A LITTLE OF THE TIME
ACT_TOTALSCORE: 20
ACT_TOTALSCORE: 20
QUESTION_2 LAST FOUR WEEKS HOW OFTEN HAVE YOU HAD SHORTNESS OF BREATH: ONCE OR TWICE A WEEK
QUESTION_5 LAST FOUR WEEKS HOW WOULD YOU RATE YOUR ASTHMA CONTROL: WELL CONTROLLED

## 2024-12-09 ASSESSMENT — SOCIAL DETERMINANTS OF HEALTH (SDOH): HOW OFTEN DO YOU GET TOGETHER WITH FRIENDS OR RELATIVES?: THREE TIMES A WEEK

## 2024-12-09 NOTE — PROGRESS NOTES
"Preventive Care Visit  [facility] RC Simeon PA-C, Family Medicine  Dec 9, 2024      Assessment & Plan     Routine general medical examination at a health care facility  Discussed routine health maintenance and lifestyle recommendations.    Elevated BP without diagnosis of hypertension  Blood pressure elevated but did come down during the visit. Has had elevated readings in the past. Recommended monitoring, she is not interested in medication at this time.   - TSH with free T4 reflex; Future    RHIANNON (generalized anxiety disorder)  Continues to have high generalized anxiety. Has access to [employer] therapy through work, will look into that.  In the past: With prozac - \"I felt like I was having a heart attack\" but was also on antibiotics and steroids at the time so she isn't sure which caused the side effects. She was nervous and did not start the sertraline prescribed last year.    Patient interested in starting sertraline at this time.. Discussed potential risks/benefits/side effects including black box warning of SI. Discussed most benefit from dual treatment with medication and therapy, and need for close follow up. Discussed crisis resources and need to seek care immediately thoughts of suicide or self harm. Discussed self care, resources.   - sertraline (ZOLOFT) 25 MG tablet; Take 1 tablet (25 mg) by mouth daily for 14 days, THEN 2 tablets (50 mg) daily.    Iron deficiency anemia due to chronic blood loss  She defers labwork today. Menses are no longer heavy. Should recheck labs.  - CBC with platelets and differential; Future  - Ferritin; Future  - Iron and iron binding capacity; Future    Mild persistent asthma without complication  She didn't have the steroid inhaler (isn't sure what happened) and uses the albuterol each morning scheduled. Recommended start fluticasone scheduled and albuterol PRN only.  - fluticasone (ARNUITY ELLIPTA) 50 MCG/ACT inhaler; Inhale 1 puff into the lungs " daily.  - albuterol (PROVENTIL) (2.5 MG/3ML) 0.083% neb solution; Take 1 vial (2.5 mg) by nebulization every 6 hours as needed for shortness of breath or wheezing.    Gastroesophageal reflux disease without esophagitis  She has chronically been on omeprazole. She has not had an EGD in the past. She is interested in discontinuing omeprazole, discussed rebound GERD and strategies for this.    Inattention  She suspects she has ADHD. Loses track of thoughts while speaking, worries about everything but doesn't feel like anxiety. Brain doesn't stop. Discussed overlap of ADHD and anxiety symptoms. Recommended psychological testing, patient is agreeable to this.  - Adult Mental Health  Referral; Future    Skin lesion  New skin lesion on face x2 months. Unsure if superficial cancer/precancer vs benign. Recommended dermatology consult.  - Adult Dermatology  Referral; Future    Cervical cancer screening  Patient deferred due to menses. Recommended we do this at follow up visit in 1-2 months.    Recommended follow up in 1-2 months in office for recheck of anxiety, asthma, as well as PAP smear.    Counseling  Appropriate preventive services were addressed with this patient via screening, questionnaire, or discussion as appropriate for fall prevention, nutrition, physical activity, Tobacco-use cessation, social engagement, weight loss and cognition.  Checklist reviewing preventive services available has been given to the patient.  Reviewed patient's diet, addressing concerns and/or questions.   She is at risk for psychosocial distress and has been provided with information to reduce risk.       Neris Conklin is a 39 year old, presenting for the following:  Physical        12/9/2024     3:16 PM   Additional Questions   Roomed by Shasta   Accompanied by Self          HPI  Period started yesterday, will skip pap today        Health Care Directive  Patient does not have a Health Care Directive: Discussed  advance care planning with patient; however, patient declined at this time.      2024   General Health   How would you rate your overall physical health? Good   Feel stress (tense, anxious, or unable to sleep) To some extent      (!) STRESS CONCERN      2024   Nutrition   Three or more servings of calcium each day? Yes   Diet: Regular (no restrictions)   How many servings of fruit and vegetables per day? (!) 2-3   How many sweetened beverages each day? 0-1            2024   Exercise   Days per week of moderate/strenous exercise 5 days            2024   Social Factors   Frequency of gathering with friends or relatives Three times a week   Worry food won't last until get money to buy more No   Food not last or not have enough money for food? No   Do you have housing? (Housing is defined as stable permanent housing and does not include staying ouside in a car, in a tent, in an abandoned building, in an overnight shelter, or couch-surfing.) Yes   Are you worried about losing your housing? No   Lack of transportation? No   Unable to get utilities (heat,electricity)? No            2024   Dental   Dentist two times every year? Yes            2024   TB Screening   Were you born outside of the [place]? No        Today's PHQ-2 Score:       2024     3:08 PM   PHQ-2 (  [employer])   Q1: Little interest or pleasure in doing things 0    Q2: Feeling down, depressed or hopeless 0    PHQ-2 Score 0    Q1: Little interest or pleasure in doing things Not at all   Q2: Feeling down, depressed or hopeless Not at all   PHQ-2 Score 0       Patient-reported           2024   Substance Use   Alcohol more than 3/day or more than 7/wk No   Do you use any other substances recreationally? No        Social History     Tobacco Use    Smoking status: Former     Current packs/day: 0.00     Types: Cigarettes     Quit date: 2018     Years since quittin.9    Smokeless tobacco: Never    Tobacco comments:     quit  with pregnancy -   Vaping Use    Vaping status: Never Used   Substance Use Topics    Alcohol use: Yes     Comment: rare- quit with pregnancy    Drug use: No          Mammogram Screening - Patient under 40 years of age: Routine Mammogram Screening not recommended.         2024   STI Screening   New sexual partner(s) since last STI/HIV test? No      History of abnormal Pap smear: YES - reflected in Problem List and Health Maintenance accordingly        Latest Ref Rng & Units 2018    11:20 AM 2018    10:50 AM 10/17/2013    12:00 AM   PAP / HPV   PAP (Historical)  NIL   NIL    HPV 16 DNA NEG^Negative  Negative     HPV 18 DNA NEG^Negative  Negative     Other HR HPV NEG^Negative  Negative            Reviewed and updated as needed this visit by Provider                    Past Medical History:   Diagnosis Date    Asthma attack age 6    hospitalized as a child    Chickenpox     Papanicolaou smear of cervix with low grade squamous intraepithelial lesion (LGSIL) 2008    + HPV 16. Colpo and f/u paps NIL    Tobacco use disorder 2015    Quit date 2015 !     Urinary tract bacterial infections     age 20-22     Past Surgical History:   Procedure Laterality Date     SECTION N/A 12/10/2015    Procedure:  SECTION;  Surgeon: Norma Sharma MD;  Location: [facility]     SECTION, TUBAL LIGATION, COMBINED N/A 2018    Procedure: COMBINED  SECTION, TUBAL LIGATION;   Section and Tubal Sterilization;  Surgeon: Nikita Walton MD;  Location: [facility] OR    MOUTH SURGERY      wisdom teeth    TUBAL LIGATION       Lab work is in process  Labs reviewed in EPIC  BP Readings from Last 3 Encounters:   24 (!) 149/95   23 (!) 138/94   21 (!) 146/96    Wt Readings from Last 3 Encounters:   24 65.1 kg (143 lb 8 oz)   23 66.3 kg (146 lb 3.2 oz)   21 65.2 kg (143 lb 12.8 oz)            Patient Active Problem List   Diagnosis    Allergic rhinitis     Exercise-induced asthma    GERD (gastroesophageal reflux disease)    S/P  section    Tinea corporis    Insomnia    Elevated BP without diagnosis of hypertension    RHIANNON (generalized anxiety disorder)    Mild persistent asthma with acute exacerbation    Vitamin D deficiency     Past Surgical History:   Procedure Laterality Date     SECTION N/A 12/10/2015    Procedure:  SECTION;  Surgeon: Norma Sharma MD;  Location: [facility]     SECTION, TUBAL LIGATION, COMBINED N/A 2018    Procedure: COMBINED  SECTION, TUBAL LIGATION;   Section and Tubal Sterilization;  Surgeon: Nikita Walton MD;  Location: [facility]    MOUTH SURGERY      wisdom teeth    TUBAL LIGATION         Social History     Tobacco Use    Smoking status: Former     Current packs/day: 0.00     Types: Cigarettes     Quit date: 2018     Years since quittin.9    Smokeless tobacco: Never    Tobacco comments:     quit with pregnancy -   Substance Use Topics    Alcohol use: Yes     Comment: rare- quit with pregnancy     Family History   Problem Relation Age of Onset    Allergies Mother         food allergies as well as seasonal and bees    Asthma Mother     Hypertension Father     C.A.D. Father     Gastrointestinal Disease Father         IBS- ulcer    Hypertension Paternal Grandmother     Arthritis Paternal Grandmother     Cerebrovascular Disease Paternal Grandmother     Cancer Paternal Grandfather         bladder cancer    Alzheimer Disease Paternal Grandfather     Tuberculosis Maternal Grandfather     Diabetes No family hx of     Breast Cancer No family hx of     Cancer - colorectal No family hx of              Review of Systems  CONSTITUTIONAL: NEGATIVE for fever, chills, change in weight  INTEGUMENTARY/SKIN: NEGATIVE for worrisome rashes, moles or lesions  EYES: NEGATIVE for vision changes or irritation  ENT/MOUTH: NEGATIVE for ear, mouth and throat problems  RESP: NEGATIVE for significant  "cough or SOB  BREAST: NEGATIVE for masses, tenderness or discharge  CV: NEGATIVE for chest pain, palpitations or peripheral edema  GI: NEGATIVE for nausea, abdominal pain, heartburn, or change in bowel habits  : NEGATIVE for frequency, dysuria, or hematuria  MUSCULOSKELETAL: NEGATIVE for significant arthralgias or myalgia  NEURO: NEGATIVE for weakness, dizziness or paresthesias  ENDOCRINE: NEGATIVE for temperature intolerance, skin/hair changes  HEME: NEGATIVE for bleeding problems  PSYCHIATRIC: NEGATIVE for changes in mood or affect     Objective    Exam  BP (!) 149/95   Pulse 98   Temp 99.3  F (37.4  C) (Tympanic)   Resp 16   Ht 1.647 m (5' 4.84\")   Wt 65.1 kg (143 lb 8 oz)   LMP 12/08/2024   SpO2 100%   BMI 24.00 kg/m     Estimated body mass index is 24 kg/m  as calculated from the following:    Height as of this encounter: 1.647 m (5' 4.84\").    Weight as of this encounter: 65.1 kg (143 lb 8 oz).    Physical Exam  GENERAL: alert and no distress  EYES: Eyes grossly normal to inspection, PERRL and conjunctivae and sclerae normal  HENT: ear canals and TM's normal, nose and mouth without ulcers or lesions  NECK: no adenopathy, no asymmetry, masses, or scars  RESP: lungs clear to auscultation - no rales, rhonchi or wheezes  CV: regular rate and rhythm, normal S1 S2, no S3 or S4, no murmur, click or rub, no peripheral edema  ABDOMEN: soft, nontender, no hepatosplenomegaly, no masses and bowel sounds normal  MS: no gross musculoskeletal defects noted, no edema  SKIN: POSITIVE left cheek 3mm raised almost domed flesh colored papule  NEURO: Normal strength and tone, mentation intact and speech normal  PSYCH: mentation appears normal, affect normal/bright  LYMPH: no cervical, supraclavicular, axillary, or inguinal adenopathy       Signed Electronically by: Rosario Simeon PA-C    "

## 2024-12-09 NOTE — PATIENT INSTRUCTIONS
Patient Education   Preventive Care Advice   This is general advice given by our system to help you stay healthy. However, your care team may have specific advice just for you. Please talk to your care team about your preventive care needs.  Nutrition  Eat 5 or more servings of fruits and vegetables each day.  Try wheat bread, brown rice and whole grain pasta (instead of white bread, rice, and pasta).  Get enough calcium and vitamin D. Check the label on foods and aim for 100% of the RDA (recommended daily allowance).  Lifestyle  Exercise at least 150 minutes each week  (30 minutes a day, 5 days a week).  Do muscle strengthening activities 2 days a week. These help control your weight and prevent disease.  No smoking.  Wear sunscreen to prevent skin cancer.  Have a dental exam and cleaning every 6 months.  Yearly exams  See your health care team every year to talk about:  Any changes in your health.  Any medicines your care team has prescribed.  Preventive care, family planning, and ways to prevent chronic diseases.  Shots (vaccines)   HPV shots (up to age 26), if you've never had them before.  Hepatitis B shots (up to age 59), if you've never had them before.  COVID-19 shot: Get this shot when it's due.  Flu shot: Get a flu shot every year.  Tetanus shot: Get a tetanus shot every 10 years.  Pneumococcal, hepatitis A, and RSV shots: Ask your care team if you need these based on your risk.  Shingles shot (for age 50 and up)  General health tests  Diabetes screening:  Starting at age 35, Get screened for diabetes at least every 3 years.  If you are younger than age 35, ask your care team if you should be screened for diabetes.  Cholesterol test: At age 39, start having a cholesterol test every 5 years, or more often if advised.  Bone density scan (DEXA): At age 50, ask your care team if you should have this scan for osteoporosis (brittle bones).  Hepatitis C: Get tested at least once in your life.  STIs (sexually  transmitted infections)  Before age 24: Ask your care team if you should be screened for STIs.  After age 24: Get screened for STIs if you're at risk. You are at risk for STIs (including HIV) if:  You are sexually active with more than one person.  You don't use condoms every time.  You or a partner was diagnosed with a sexually transmitted infection.  If you are at risk for HIV, ask about PrEP medicine to prevent HIV.  Get tested for HIV at least once in your life, whether you are at risk for HIV or not.  Cancer screening tests  Cervical cancer screening: If you have a cervix, begin getting regular cervical cancer screening tests starting at age 21.  Breast cancer scan (mammogram): If you've ever had breasts, begin having regular mammograms starting at age 40. This is a scan to check for breast cancer.  Colon cancer screening: It is important to start screening for colon cancer at age 45.  Have a colonoscopy test every 10 years (or more often if you're at risk) Or, ask your provider about stool tests like a FIT test every year or Cologuard test every 3 years.  To learn more about your testing options, visit:   .  For help making a decision, visit:   https://bit.ly/gy22975.  Prostate cancer screening test: If you have a prostate, ask your care team if a prostate cancer screening test (PSA) at age 55 is right for you.  Lung cancer screening: If you are a current or former smoker ages 50 to 80, ask your care team if ongoing lung cancer screenings are right for you.  For informational purposes only. Not to replace the advice of your health care provider. Copyright   2023 [facility]. All rights reserved. Clinically reviewed by the [facility]. [employer] 375326 - REV 01/24.  Learning About Stress  What is stress?     Stress is your body's response to a hard situation. Your body can have a physical, emotional, or mental response. Stress is a fact of life for most people, and it  affects everyone differently. What causes stress for you may not be stressful for someone else.  A lot of things can cause stress. You may feel stress when you go on a job interview, take a test, or run a race. This kind of short-term stress is normal and even useful. It can help you if you need to work hard or react quickly. For example, stress can help you finish an important job on time.  Long-term stress is caused by ongoing stressful situations or events. Examples of long-term stress include long-term health problems, ongoing problems at work, or conflicts in your family. Long-term stress can harm your health.  How does stress affect your health?  When you are stressed, your body responds as though you are in danger. It makes hormones that speed up your heart, make you breathe faster, and give you a burst of energy. This is called the fight-or-flight stress response. If the stress is over quickly, your body goes back to normal and no harm is done.  But if stress happens too often or lasts too long, it can have bad effects. Long-term stress can make you more likely to get sick, and it can make symptoms of some diseases worse. If you tense up when you are stressed, you may develop neck, shoulder, or low back pain. Stress is linked to high blood pressure and heart disease.  Stress also harms your emotional health. It can make you abdul, tense, or depressed. Your relationships may suffer, and you may not do well at work or school.  What can you do to manage stress?  You can try these things to help manage stress:   Do something active. Exercise or activity can help reduce stress. Walking is a great way to get started. Even everyday activities such as housecleaning or yard work can help.  Try yoga or kevon chi. These techniques combine exercise and meditation. You may need some training at first to learn them.  Do something you enjoy. For example, listen to music or go to a movie. Practice your hobby or do volunteer  "work.  Meditate. This can help you relax, because you are not worrying about what happened before or what may happen in the future.  Do guided imagery. Imagine yourself in any setting that helps you feel calm. You can use online videos, books, or a teacher to guide you.  Do breathing exercises. For example:  From a standing position, bend forward from the waist with your knees slightly bent. Let your arms dangle close to the floor.  Breathe in slowly and deeply as you return to a standing position. Roll up slowly and lift your head last.  Hold your breath for just a few seconds in the standing position.  Breathe out slowly and bend forward from the waist.  Let your feelings out. Talk, laugh, cry, and express anger when you need to. Talking with supportive friends or family, a counselor, or a yajaira leader about your feelings is a healthy way to relieve stress. Avoid discussing your feelings with people who make you feel worse.  Write. It may help to write about things that are bothering you. This helps you find out how much stress you feel and what is causing it. When you know this, you can find better ways to cope.  What can you do to prevent stress?  You might try some of these things to help prevent stress:  Manage your time. This helps you find time to do the things you want and need to do.  Get enough sleep. Your body recovers from the stresses of the day while you are sleeping.  Get support. Your family, friends, and community can make a difference in how you experience stress.  Limit your news feed. Avoid or limit time on social media or news that may make you feel stressed.  Do something active. Exercise or activity can help reduce stress. Walking is a great way to get started.  Where can you learn more?  Go to https://www."[employer],".net/patiented  Enter N032 in the search box to learn more about \"Learning About Stress.\"  Current as of: October 24, 2023  Content Version: 14.2 2024 [employer]. "   Care instructions adapted under license by your healthcare professional. If you have questions about a medical condition or this instruction, always ask your healthcare professional. [employer] disclaims any warranty or liability for your use of this information.

## 2025-01-20 DIAGNOSIS — J45.990 EXERCISE-INDUCED ASTHMA: ICD-10-CM

## 2025-01-20 RX ORDER — ALBUTEROL SULFATE 90 UG/1
1-2 INHALANT RESPIRATORY (INHALATION) EVERY 6 HOURS PRN
Qty: 8.5 G | Refills: 2 | Status: SHIPPED | OUTPATIENT
Start: 2025-01-20

## 2025-03-17 ENCOUNTER — PATIENT OUTREACH (OUTPATIENT)
Dept: FAMILY MEDICINE | Facility: CLINIC | Age: 40
End: 2025-03-17
Payer: COMMERCIAL

## 2025-03-17 NOTE — TELEPHONE ENCOUNTER
Patient Quality Outreach    Patient is due for the following:   Cervical Cancer Screening - PAP Needed    Action(s) Taken:   Schedule a office visit for Pap    Type of outreach:    Phone, left message for patient/parent to call back.    Questions for provider review:    None           Shasta Blanchard, [facility]  Chart routed to Care Team.

## 2025-08-02 ENCOUNTER — HEALTH MAINTENANCE LETTER (OUTPATIENT)
Age: 40
End: 2025-08-02

## 2025-08-03 DIAGNOSIS — J45.990 EXERCISE-INDUCED ASTHMA: ICD-10-CM

## 2025-08-04 ENCOUNTER — MYC REFILL (OUTPATIENT)
Dept: FAMILY MEDICINE | Facility: CLINIC | Age: 40
End: 2025-08-04
Payer: COMMERCIAL

## 2025-08-04 DIAGNOSIS — J45.990 EXERCISE-INDUCED ASTHMA: ICD-10-CM

## 2025-08-04 RX ORDER — ALBUTEROL SULFATE 90 UG/1
1-2 INHALANT RESPIRATORY (INHALATION) EVERY 6 HOURS PRN
Qty: 8.5 G | Refills: 2 | OUTPATIENT
Start: 2025-08-04

## 2025-08-04 RX ORDER — ALBUTEROL SULFATE 90 UG/1
INHALANT RESPIRATORY (INHALATION)
Qty: 18 G | Refills: 1 | Status: SHIPPED | OUTPATIENT
Start: 2025-08-04

## (undated) DEVICE — GLOVE PROTEXIS W/NEU-THERA 7.0  2D73TE70

## (undated) DEVICE — GLOVE PROTEXIS W/NEU-THERA 8.0  2D73TE80

## (undated) DEVICE — GLOVE PROTEXIS W/NEU-THERA 7.5  2D73TE75

## (undated) DEVICE — SU VICRYL 1 CT-1 36" UND J947H

## (undated) DEVICE — SU WND CLOSURE VLOC 90 ABS 3-0 18" P-14 VLOCM0124

## (undated) DEVICE — PREP DURAPREP 26ML APL 8630

## (undated) DEVICE — STOCKING SLEEVE COMPRESSION CALF MED

## (undated) DEVICE — SOL WATER IRRIG 1000ML BOTTLE 07139-09

## (undated) DEVICE — PACK C-SECTION LF PL15OTA83B

## (undated) DEVICE — CATH TRAY FOLEY SURESTEP 16FR W/URINE MTR STATLK LF A303416A

## (undated) DEVICE — ADHESIVE SWIFTSET 0.8ML OCTYL SS6

## (undated) DEVICE — SU CHROMIC 0 BP-1 27" 47T

## (undated) DEVICE — GOWN IMPERVIOUS SPECIALTY XLG/XLONG 32474

## (undated) DEVICE — GLOVE PROTEXIS BLUE W/NEU-THERA 8.0  2D73EB80

## (undated) DEVICE — GLOVE PROTEXIS MICRO 5.5  2D73PM55

## (undated) DEVICE — LABEL MEDICATION SYSTEM  3304

## (undated) DEVICE — SU VICRYL 0 CT-1 36" J946H

## (undated) DEVICE — GLOVE PROTEXIS BLUE W/NEU-THERA 6.0  2D73EB60

## (undated) DEVICE — SUCTION KIWI VAC  VAC-6000M

## (undated) DEVICE — LINEN BABY BLANKET 5434

## (undated) DEVICE — SU VICRYL 2-0 CT-1 36" UND J945H

## (undated) DEVICE — SOL NACL 0.9% IRRIG 1000ML BOTTLE 07138-09

## (undated) DEVICE — BLADE CLIPPER 4406

## (undated) DEVICE — GOWN XLG DISP 9545

## (undated) DEVICE — GLOVE PROTEXIS BLUE W/NEU-THERA 7.5  2D73EB75

## (undated) RX ORDER — OXYTOCIN/0.9 % SODIUM CHLORIDE 30/500 ML
PLASTIC BAG, INJECTION (ML) INTRAVENOUS
Status: DISPENSED
Start: 2018-08-17

## (undated) RX ORDER — KETOROLAC TROMETHAMINE 30 MG/ML
INJECTION, SOLUTION INTRAMUSCULAR; INTRAVENOUS
Status: DISPENSED
Start: 2018-08-17

## (undated) RX ORDER — FENTANYL CITRATE 50 UG/ML
INJECTION, SOLUTION INTRAMUSCULAR; INTRAVENOUS
Status: DISPENSED
Start: 2018-08-17

## (undated) RX ORDER — MORPHINE SULFATE 1 MG/ML
INJECTION, SOLUTION EPIDURAL; INTRATHECAL; INTRAVENOUS
Status: DISPENSED
Start: 2018-08-17

## (undated) RX ORDER — ONDANSETRON 2 MG/ML
INJECTION INTRAMUSCULAR; INTRAVENOUS
Status: DISPENSED
Start: 2018-08-17